# Patient Record
Sex: FEMALE | Race: WHITE | NOT HISPANIC OR LATINO | Employment: FULL TIME | URBAN - METROPOLITAN AREA
[De-identification: names, ages, dates, MRNs, and addresses within clinical notes are randomized per-mention and may not be internally consistent; named-entity substitution may affect disease eponyms.]

---

## 2017-01-18 ENCOUNTER — LAB VISIT (OUTPATIENT)
Dept: LAB | Facility: HOSPITAL | Age: 27
End: 2017-01-18
Attending: INTERNAL MEDICINE
Payer: COMMERCIAL

## 2017-01-18 DIAGNOSIS — I10 ESSENTIAL HYPERTENSION: ICD-10-CM

## 2017-01-18 LAB
ANION GAP SERPL CALC-SCNC: 8 MMOL/L
BUN SERPL-MCNC: 10 MG/DL
CALCIUM SERPL-MCNC: 8.7 MG/DL
CHLORIDE SERPL-SCNC: 108 MMOL/L
CO2 SERPL-SCNC: 22 MMOL/L
CREAT SERPL-MCNC: 0.9 MG/DL
EST. GFR  (AFRICAN AMERICAN): >60 ML/MIN/1.73 M^2
EST. GFR  (NON AFRICAN AMERICAN): >60 ML/MIN/1.73 M^2
GLUCOSE SERPL-MCNC: 91 MG/DL
POTASSIUM SERPL-SCNC: 4.1 MMOL/L
SODIUM SERPL-SCNC: 138 MMOL/L

## 2017-01-18 PROCEDURE — 36415 COLL VENOUS BLD VENIPUNCTURE: CPT | Mod: PO

## 2017-01-18 PROCEDURE — 80048 BASIC METABOLIC PNL TOTAL CA: CPT

## 2017-01-25 ENCOUNTER — OFFICE VISIT (OUTPATIENT)
Dept: NEPHROLOGY | Facility: CLINIC | Age: 27
End: 2017-01-25
Payer: COMMERCIAL

## 2017-01-25 VITALS
HEIGHT: 67 IN | HEART RATE: 104 BPM | SYSTOLIC BLOOD PRESSURE: 130 MMHG | BODY MASS INDEX: 28.55 KG/M2 | DIASTOLIC BLOOD PRESSURE: 80 MMHG | WEIGHT: 181.88 LBS

## 2017-01-25 DIAGNOSIS — I10 ESSENTIAL HYPERTENSION: ICD-10-CM

## 2017-01-25 DIAGNOSIS — O09.90 HIGH-RISK PREGNANCY, UNSPECIFIED TRIMESTER: ICD-10-CM

## 2017-01-25 DIAGNOSIS — R80.9 PROTEINURIA, UNSPECIFIED TYPE: ICD-10-CM

## 2017-01-25 DIAGNOSIS — Q61.3 POLYCYSTIC KIDNEY DISEASE: Primary | ICD-10-CM

## 2017-01-25 PROCEDURE — 1159F MED LIST DOCD IN RCRD: CPT | Mod: S$GLB,,, | Performed by: INTERNAL MEDICINE

## 2017-01-25 PROCEDURE — 99999 PR PBB SHADOW E&M-EST. PATIENT-LVL III: CPT | Mod: PBBFAC,,, | Performed by: INTERNAL MEDICINE

## 2017-01-25 PROCEDURE — 99214 OFFICE O/P EST MOD 30 MIN: CPT | Mod: S$GLB,,, | Performed by: INTERNAL MEDICINE

## 2017-01-25 NOTE — MR AVS SNAPSHOT
[facility] Nephrology  [address] [place] [address]  Phone: [phone]  Fax: [phone]                  Nathalie Roland   2017 1:30 PM   Office Visit    Description:  Female : 1990   Provider:  Mee Rabago MD   Department:  [facility] - Nephrology           Reason for Visit     Polycystic Kidney Disease     Follow-up           Diagnoses this Visit        Comments    Polycystic kidney disease    -  Primary            To Do List           Future Appointments        Provider Department Dept Phone    2017 2:00 PM ULTRASOUND, OB-GYN [facility] OB/ -[phone]    2017 4:45 PM Mckenzie Metcalf CNM [facility] OB/ -[phone]    3/14/2017 9:45 AM LABORATORY, [facility] [phone]    3/14/2017 9:50 AM SPECIMEN, [facility] [phone]    3/21/2017 3:00 PM [facility] [phone]      Goals (5 Years of Data)     None      Follow-Up and Disposition     Return in about 2 months (around 3/25/2017) for 2017.      [facility]     [facility] -  Assistance  Registered nurses in the [facility] provide clinical advisement, health education, appointment booking, and other advisory services.  Call for this free service at [phone].             Medications           Message regarding Medications     Verify the changes and/or additions to your medication regime listed below are the same as discussed with your clinician today.  If any of these changes or additions are incorrect, please notify your healthcare provider.             Verify that the below list of medications is an accurate representation of the medications you are currently taking.  If none reported, the list may be blank. If incorrect, please contact your healthcare provider. Carry this list with you in case of emergency.           Current Medications     butalbital-acetaminophen-caffeine  "-40 mg (FIORICET) -40 mg per tablet Take 1-2 tablets by mouth every 6 (six) hours as needed for Headaches.    methyldopa (ALDOMET) 250 MG tablet Take 2 tablets (500 mg total) by mouth every 12 (twelve) hours.           Clinical Reference Information           Prenatal Vitals     Enc. Date GA Prenatal Vitals Prenatal Pulse Pain Level Urine Albumin/Glucose Edema Presentation Dilation/Effacement/Station    1/25/17 23w1d 130/80 / 82.5 kg (181 lb 14.1 oz)  104         12/29/16 19w2d 112/70 / 80.7 kg (178 lb)  / 148 / Present          12/23/16 18w3d 144/92 (A) / 82.1 kg (181 lb)  / 163 / Present          12/1/16 15w2d 128/76 / 79.8 kg (176 lb)  / 160  / Absent  0  None / None      11/3/16 11w2d 138/88 / 78 kg (171 lb 15.3 oz)   0           TWG: 3.629 kg (8 lb)   Pregravid weight: 77.1 kg (170 lb)   Number of fetuses: 1       Vital Signs - Last Recorded  Most recent update: 1/25/2017  1:30 PM by Sukhdeep Fraser MA    BP Pulse Ht Wt LMP BMI    130/80 104 5' 7" (1.702 m) 82.5 kg (181 lb 14.1 oz) 08/16/2016 (Exact Date) 28.49 kg/m2      Blood Pressure          Most Recent Value    BP  130/80      Allergies as of 1/25/2017     No Known Allergies      Immunizations Administered on Date of Encounter - 1/25/2017     None      Orders Placed During Today's Visit     Future Labs/Procedures Expected by Expires    Basic metabolic panel  1/25/2017 3/26/2018    CBC auto differential  1/25/2017 3/26/2018    Protein / creatinine ratio, urine  As directed 1/25/2018      "

## 2017-01-25 NOTE — PROGRESS NOTES
NEPHROLOGY CLINIC FOLLOWUP NOTE        Date of clinic visit: 1/25/17      REASON FOR FOLLOWUP AND CHIEF COMPLAINT: history of polycystic kidney disease.        HISTORY OF PRESENT ILLNESS: Ms. Morales is a 26-year-old  female with a history of polycystic kidney disease who presents for followup. Pt was about 2 months ago. Pt is now 23 weeks pregnant. She has followed closely with OB/Gyn. Pt is on methyldopa, the dose was increased last visit to control BP. She reports no problems with the medicine. She says she is doing well today. No new or acute c/o's, no new problems, no shortness of breath, no recent infections of the kidney. No UTIs.      PAST MEDICAL HISTORY:    1. Polycystic kidney disease diagnosed at the age of 12.    2. Hypertension.    3. Upper respiratory tract infections.    4. Hernia repair at the age of 2.    5. Migraine headaches.        FAMILY HISTORY: Reviewed. Mother with ESRD, status post kidney    transplant. Mother has polycystic kidney disease.      ALLERGIES: Reviewed. No known drug allergies.        MEDICATIONS: methyldopa 500 mg po bid    REVIEW OF SYSTEMS: No recent hospitalizations. No recent other issues.    HEAD, EAR, EYES, NOSE, THROAT: Negative.    CARDIAC: Negative.    PULMONARY: Negative.    GASTROINTESTINAL: Negative.    GENITOURINARY: Negative.    PSYCHOLOGICAL: Negative.    NEUROLOGICAL: Negative.    The rest of the review of systems negative.        PHYSICAL EXAMINATION:    VITAL SIGNS: Blood pressure on arrival was 130/80. Pulse 104. Weight is 181 lbs, last visit 176 lbs  GENERAL: She is ambulatory, in no acute distress.    HEART: Regular rate and rhythm.  s1 and s2 audible  CHEST: Clear to auscultation.  no rales no wheezes  ABDOMEN: Soft, nontender.    EXTREMITIES: Showed no edema.        Labs: reviewed  BMP  Lab Results   Component Value Date     01/18/2017    K 4.1 01/18/2017     01/18/2017    CO2 22 (L) 01/18/2017    BUN 10 01/18/2017    CREATININE 0.9  01/18/2017    CALCIUM 8.7 01/18/2017    ANIONGAP 8 01/18/2017    ESTGFRAFRICA >60.0 01/18/2017    EGFRNONAA >60.0 01/18/2017     Lab Results   Component Value Date    WBC 8.08 10/20/2016    HGB 11.1 (L) 10/20/2016    HCT 34.2 (L) 10/20/2016    MCV 85 10/20/2016     10/20/2016     Urine p/cr 160 mg  ua reviewed     ASSESSMENT AND PLAN: This is a 26 year-old female with history of polycystic kidney disease who presents for followup.  Pt is pregnant  The impression is as follows:        1. Renal function is stable. PCKD  Creatinine has not changed, stable renal function.  Cr is at baseline  Mild proteinuria, stable      2. Hypertension. Blood pressure is controlled  On methyldopa, taking the medicine      3. OB: pt is 23 wks pregnant.  Following with OB.      4. Headaches. No issues today.   MRI of the head was normal in 2007.       PLANS AND RECOMMENDATIONS:    As detailed above  RTC 2 months  Total time spent 25 min. More than 50% spent on counseling and coordination of care.      Mee Rabago M.D.

## 2017-01-26 ENCOUNTER — PATIENT MESSAGE (OUTPATIENT)
Dept: OBSTETRICS AND GYNECOLOGY | Facility: CLINIC | Age: 27
End: 2017-01-26

## 2017-02-02 ENCOUNTER — PROCEDURE VISIT (OUTPATIENT)
Dept: OBSTETRICS AND GYNECOLOGY | Facility: CLINIC | Age: 27
End: 2017-02-02
Payer: COMMERCIAL

## 2017-02-02 ENCOUNTER — ROUTINE PRENATAL (OUTPATIENT)
Dept: OBSTETRICS AND GYNECOLOGY | Facility: CLINIC | Age: 27
End: 2017-02-02
Payer: COMMERCIAL

## 2017-02-02 VITALS
BODY MASS INDEX: 28.52 KG/M2 | WEIGHT: 182.13 LBS | SYSTOLIC BLOOD PRESSURE: 134 MMHG | DIASTOLIC BLOOD PRESSURE: 88 MMHG

## 2017-02-02 DIAGNOSIS — Z36.89 ENCOUNTER FOR FETAL ANATOMIC SURVEY: ICD-10-CM

## 2017-02-02 DIAGNOSIS — Z34.02 ENCOUNTER FOR SUPERVISION OF NORMAL FIRST PREGNANCY IN SECOND TRIMESTER: Primary | ICD-10-CM

## 2017-02-02 PROCEDURE — 0502F SUBSEQUENT PRENATAL CARE: CPT | Mod: S$GLB,,, | Performed by: ADVANCED PRACTICE MIDWIFE

## 2017-02-02 PROCEDURE — 76816 OB US FOLLOW-UP PER FETUS: CPT | Mod: S$GLB,,, | Performed by: OBSTETRICS & GYNECOLOGY

## 2017-02-02 PROCEDURE — 99999 PR PBB SHADOW E&M-EST. PATIENT-LVL II: CPT | Mod: PBBFAC,,, | Performed by: ADVANCED PRACTICE MIDWIFE

## 2017-02-02 NOTE — PROGRESS NOTES
Doing well, denies problems. US today showed normal and complete anatomy, abdominal wall, spine, and heart. EFW 1 lb 5 oz, 12%.  28 week labs next visit. Will get baseline CMP because of her history of kidney disease.

## 2017-02-10 ENCOUNTER — TELEPHONE (OUTPATIENT)
Dept: OBSTETRICS AND GYNECOLOGY | Facility: CLINIC | Age: 27
End: 2017-02-10

## 2017-02-10 NOTE — TELEPHONE ENCOUNTER
----- Message from Mel Waggoner sent at 2/10/2017 10:35 AM CST -----  Contact: Patient  Patient has had a bad cold for a week and would like to be adivsed, please call her back at [phone]. Thank you

## 2017-03-03 ENCOUNTER — LAB VISIT (OUTPATIENT)
Dept: LAB | Facility: HOSPITAL | Age: 27
End: 2017-03-03
Attending: NURSE PRACTITIONER
Payer: COMMERCIAL

## 2017-03-03 ENCOUNTER — ROUTINE PRENATAL (OUTPATIENT)
Dept: OBSTETRICS AND GYNECOLOGY | Facility: CLINIC | Age: 27
End: 2017-03-03
Payer: COMMERCIAL

## 2017-03-03 VITALS — WEIGHT: 186.5 LBS | DIASTOLIC BLOOD PRESSURE: 62 MMHG | BODY MASS INDEX: 29.21 KG/M2 | SYSTOLIC BLOOD PRESSURE: 118 MMHG

## 2017-03-03 DIAGNOSIS — Z23 NEED FOR TDAP VACCINATION: ICD-10-CM

## 2017-03-03 DIAGNOSIS — Z34.03 ENCOUNTER FOR SUPERVISION OF NORMAL FIRST PREGNANCY IN THIRD TRIMESTER: Primary | ICD-10-CM

## 2017-03-03 DIAGNOSIS — Z34.02 ENCOUNTER FOR SUPERVISION OF NORMAL FIRST PREGNANCY IN SECOND TRIMESTER: ICD-10-CM

## 2017-03-03 LAB
ALBUMIN SERPL BCP-MCNC: 2.8 G/DL
ALP SERPL-CCNC: 69 U/L
ALT SERPL W/O P-5'-P-CCNC: 24 U/L
ANION GAP SERPL CALC-SCNC: 12 MMOL/L
AST SERPL-CCNC: 16 U/L
BASOPHILS # BLD AUTO: 0.02 K/UL
BASOPHILS NFR BLD: 0.2 %
BILIRUB SERPL-MCNC: 0.4 MG/DL
BUN SERPL-MCNC: 12 MG/DL
CALCIUM SERPL-MCNC: 9 MG/DL
CHLORIDE SERPL-SCNC: 106 MMOL/L
CO2 SERPL-SCNC: 19 MMOL/L
CREAT SERPL-MCNC: 0.9 MG/DL
DIFFERENTIAL METHOD: ABNORMAL
EOSINOPHIL # BLD AUTO: 0.2 K/UL
EOSINOPHIL NFR BLD: 1.5 %
ERYTHROCYTE [DISTWIDTH] IN BLOOD BY AUTOMATED COUNT: 13.8 %
EST. GFR  (AFRICAN AMERICAN): >60 ML/MIN/1.73 M^2
EST. GFR  (NON AFRICAN AMERICAN): >60 ML/MIN/1.73 M^2
GLUCOSE SERPL-MCNC: 148 MG/DL
GLUCOSE SERPL-MCNC: 150 MG/DL
HCT VFR BLD AUTO: 32.9 %
HGB BLD-MCNC: 11 G/DL
HIV 1+2 AB+HIV1 P24 AG SERPL QL IA: NEGATIVE
LYMPHOCYTES # BLD AUTO: 1.9 K/UL
LYMPHOCYTES NFR BLD: 16.7 %
MCH RBC QN AUTO: 30.5 PG
MCHC RBC AUTO-ENTMCNC: 33.4 %
MCV RBC AUTO: 91 FL
MONOCYTES # BLD AUTO: 0.8 K/UL
MONOCYTES NFR BLD: 6.8 %
NEUTROPHILS # BLD AUTO: 8.3 K/UL
NEUTROPHILS NFR BLD: 74.2 %
PLATELET # BLD AUTO: 218 K/UL
PMV BLD AUTO: 10.8 FL
POTASSIUM SERPL-SCNC: 3.8 MMOL/L
PROT SERPL-MCNC: 6.4 G/DL
RBC # BLD AUTO: 3.61 M/UL
SODIUM SERPL-SCNC: 137 MMOL/L
WBC # BLD AUTO: 11.13 K/UL

## 2017-03-03 PROCEDURE — 90471 IMMUNIZATION ADMIN: CPT | Mod: S$GLB,,, | Performed by: ADVANCED PRACTICE MIDWIFE

## 2017-03-03 PROCEDURE — 36415 COLL VENOUS BLD VENIPUNCTURE: CPT | Mod: PO

## 2017-03-03 PROCEDURE — 0502F SUBSEQUENT PRENATAL CARE: CPT | Mod: S$GLB,,, | Performed by: ADVANCED PRACTICE MIDWIFE

## 2017-03-03 PROCEDURE — 82950 GLUCOSE TEST: CPT

## 2017-03-03 PROCEDURE — 80053 COMPREHEN METABOLIC PANEL: CPT

## 2017-03-03 PROCEDURE — 85025 COMPLETE CBC W/AUTO DIFF WBC: CPT

## 2017-03-03 PROCEDURE — 90715 TDAP VACCINE 7 YRS/> IM: CPT | Mod: S$GLB,,, | Performed by: ADVANCED PRACTICE MIDWIFE

## 2017-03-03 PROCEDURE — 86592 SYPHILIS TEST NON-TREP QUAL: CPT

## 2017-03-03 PROCEDURE — 99999 PR PBB SHADOW E&M-EST. PATIENT-LVL II: CPT | Mod: PBBFAC,,, | Performed by: ADVANCED PRACTICE MIDWIFE

## 2017-03-03 PROCEDURE — 86703 HIV-1/HIV-2 1 RESULT ANTBDY: CPT

## 2017-03-03 NOTE — PROGRESS NOTES
After identifying patient with 2 identifiers, verifying that patient wished to receive Tdap, reviewing allergies, 0.5 ml Tdap administered to right deltoid.  Patient tolerated well.  Patient instructed to wait 15 minutes and verbalized understanding.  Next appointment was already scheduled.

## 2017-03-03 NOTE — PROGRESS NOTES
Doing great, 28 week labs today. Plans epidural, breastfeeding, and condoms for birth control. TDAP today. PTL discussion, find pediatrician, and childbirth and breastfeeding classes.

## 2017-03-04 LAB — RPR SER QL: NORMAL

## 2017-03-05 DIAGNOSIS — O99.810 ABNORMAL GLUCOSE AFFECTING PREGNANCY: Primary | ICD-10-CM

## 2017-03-06 ENCOUNTER — TELEPHONE (OUTPATIENT)
Dept: OBSTETRICS AND GYNECOLOGY | Facility: CLINIC | Age: 27
End: 2017-03-06

## 2017-03-06 NOTE — TELEPHONE ENCOUNTER
----- Message from Mckenzie Metcalf CNM sent at 3/5/2017 10:36 AM CST -----  Pt failed 1 hour gtt, please schedule 3 hour GTT. Order is in.    Mckenzie

## 2017-03-07 ENCOUNTER — LAB VISIT (OUTPATIENT)
Dept: LAB | Facility: HOSPITAL | Age: 27
End: 2017-03-07
Attending: ADVANCED PRACTICE MIDWIFE
Payer: COMMERCIAL

## 2017-03-07 DIAGNOSIS — O99.810 ABNORMAL GLUCOSE AFFECTING PREGNANCY: ICD-10-CM

## 2017-03-07 LAB
GLUCOSE SERPL-MCNC: 118 MG/DL
GLUCOSE SERPL-MCNC: 190 MG/DL
GLUCOSE SERPL-MCNC: 87 MG/DL
GLUCOSE SERPL-MCNC: 95 MG/DL

## 2017-03-07 PROCEDURE — 36415 COLL VENOUS BLD VENIPUNCTURE: CPT | Mod: PO

## 2017-03-07 PROCEDURE — 82951 GLUCOSE TOLERANCE TEST (GTT): CPT

## 2017-03-08 ENCOUNTER — PATIENT MESSAGE (OUTPATIENT)
Dept: OBSTETRICS AND GYNECOLOGY | Facility: HOSPITAL | Age: 27
End: 2017-03-08

## 2017-03-09 ENCOUNTER — PATIENT MESSAGE (OUTPATIENT)
Dept: OBSTETRICS AND GYNECOLOGY | Facility: CLINIC | Age: 27
End: 2017-03-09

## 2017-03-14 ENCOUNTER — LAB VISIT (OUTPATIENT)
Dept: LAB | Facility: HOSPITAL | Age: 27
End: 2017-03-14
Attending: INTERNAL MEDICINE
Payer: COMMERCIAL

## 2017-03-14 DIAGNOSIS — Q61.3 POLYCYSTIC KIDNEY DISEASE: ICD-10-CM

## 2017-03-14 LAB
ANION GAP SERPL CALC-SCNC: 10 MMOL/L
BASOPHILS # BLD AUTO: 0.02 K/UL
BASOPHILS NFR BLD: 0.2 %
BUN SERPL-MCNC: 11 MG/DL
CALCIUM SERPL-MCNC: 8.7 MG/DL
CHLORIDE SERPL-SCNC: 108 MMOL/L
CO2 SERPL-SCNC: 20 MMOL/L
CREAT SERPL-MCNC: 0.8 MG/DL
DIFFERENTIAL METHOD: ABNORMAL
EOSINOPHIL # BLD AUTO: 0.2 K/UL
EOSINOPHIL NFR BLD: 1.6 %
ERYTHROCYTE [DISTWIDTH] IN BLOOD BY AUTOMATED COUNT: 13.6 %
EST. GFR  (AFRICAN AMERICAN): >60 ML/MIN/1.73 M^2
EST. GFR  (NON AFRICAN AMERICAN): >60 ML/MIN/1.73 M^2
GLUCOSE SERPL-MCNC: 94 MG/DL
HCT VFR BLD AUTO: 32.6 %
HGB BLD-MCNC: 11 G/DL
LYMPHOCYTES # BLD AUTO: 2.7 K/UL
LYMPHOCYTES NFR BLD: 22 %
MCH RBC QN AUTO: 30.8 PG
MCHC RBC AUTO-ENTMCNC: 33.7 %
MCV RBC AUTO: 91 FL
MONOCYTES # BLD AUTO: 1 K/UL
MONOCYTES NFR BLD: 8.4 %
NEUTROPHILS # BLD AUTO: 8.1 K/UL
NEUTROPHILS NFR BLD: 67.1 %
PLATELET # BLD AUTO: 225 K/UL
PMV BLD AUTO: 11.2 FL
POTASSIUM SERPL-SCNC: 3.9 MMOL/L
RBC # BLD AUTO: 3.57 M/UL
SODIUM SERPL-SCNC: 138 MMOL/L
WBC # BLD AUTO: 12.11 K/UL

## 2017-03-14 PROCEDURE — 36415 COLL VENOUS BLD VENIPUNCTURE: CPT | Mod: PO

## 2017-03-14 PROCEDURE — 85025 COMPLETE CBC W/AUTO DIFF WBC: CPT

## 2017-03-14 PROCEDURE — 80048 BASIC METABOLIC PNL TOTAL CA: CPT

## 2017-03-17 ENCOUNTER — ROUTINE PRENATAL (OUTPATIENT)
Dept: OBSTETRICS AND GYNECOLOGY | Facility: CLINIC | Age: 27
End: 2017-03-17
Payer: COMMERCIAL

## 2017-03-17 VITALS — WEIGHT: 190.94 LBS | SYSTOLIC BLOOD PRESSURE: 124 MMHG | BODY MASS INDEX: 29.9 KG/M2 | DIASTOLIC BLOOD PRESSURE: 86 MMHG

## 2017-03-17 DIAGNOSIS — Z34.03 ENCOUNTER FOR SUPERVISION OF NORMAL FIRST PREGNANCY IN THIRD TRIMESTER: Primary | ICD-10-CM

## 2017-03-17 PROCEDURE — 0502F SUBSEQUENT PRENATAL CARE: CPT | Mod: S$GLB,,, | Performed by: ADVANCED PRACTICE MIDWIFE

## 2017-03-17 PROCEDURE — 99999 PR PBB SHADOW E&M-EST. PATIENT-LVL II: CPT | Mod: PBBFAC,,, | Performed by: ADVANCED PRACTICE MIDWIFE

## 2017-03-17 NOTE — PROGRESS NOTES
Doing well, reviewed 3 hour GTT, normal. Still unable to schedule childbirth and breastfeeding classes.

## 2017-03-17 NOTE — MR AVS SNAPSHOT
[facility] - OB/ GYN  [address] [place] [address]  Phone: [phone]  Fax: [phone]                  Nathalie Roland   3/17/2017 2:45 PM   Routine Prenatal    Description:  Female : 1990   Provider:  Mckenzie Metcalf CNM   Department:  [facility] - OB/ GYN           Reason for Visit     Routine Prenatal Visit           Diagnoses this Visit        Comments    Encounter for supervision of normal first pregnancy in third trimester    -  Primary            To Do List           Future Appointments        Provider Department Dept Phone    3/21/2017 3:00 PM Mee Rabago MD [facility] - Nephrology [phone]    2017 1:30 PM Mckenzie Metcalf CNM [facility] - OB/ -[phone]      Goals (5 Years of Data)     None      [facility]     [facility] -  Assistance  Registered nurses in the [facility] Call Center provide clinical advisement, health education, appointment booking, and other advisory services.  Call for this free service at [phone].             Medications           Message regarding Medications     Verify the changes and/or additions to your medication regime listed below are the same as discussed with your clinician today.  If any of these changes or additions are incorrect, please notify your healthcare provider.             Verify that the below list of medications is an accurate representation of the medications you are currently taking.  If none reported, the list may be blank. If incorrect, please contact your healthcare provider. Carry this list with you in case of emergency.           Current Medications     butalbital-acetaminophen-caffeine -40 mg (FIORICET) -40 mg per tablet Take 1-2 tablets by mouth every 6 (six) hours as needed for Headaches.    methyldopa (ALDOMET) 250 MG tablet Take 2 tablets (500 mg total) by mouth every 12 (twelve) hours.           Clinical Reference Information           Prenatal Vitals     Enc. Date GA  Prenatal Vitals Prenatal Pulse Pain Level Urine Albumin/Glucose Edema Presentation Dilation/Effacement/Station    3/17/17 30w3d 124/86 / 86.6 kg (190 lb 14.7 oz)  / 159 / Present  3 Negative / Negative None / None / None / No      3/3/17 28w3d 118/62 / 84.6 kg (186 lb 8.2 oz) 27 cm / 157 / Present  0  None / None / None      17 24w2d 134/88 / 82.6 kg (182 lb 1.6 oz) 24.5 cm / us 162 / Present  0  None / None      16 19w2d 112/70 / 80.7 kg (178 lb)  / 148 / Present          16 18w3d 144/92 (A) / 82.1 kg (181 lb)  / 163 / Present          16 15w2d 128/76 / 79.8 kg (176 lb)  / 160  / Absent  0  None / None      11/3/16 11w2d 138/88 / 78 kg (171 lb 15.3 oz)   0           TW.488 kg (20 lb 14.7 oz)   Pregravid weight: 77.1 kg (170 lb)   Number of fetuses: 1       Your Vitals Were     BP Weight Last Period BMI       124/86 86.6 kg (190 lb 14.7 oz) 2016 (Exact Date) 29.9 kg/m2       Allergies as of 3/17/2017     No Known Allergies      Immunizations Administered on Date of Encounter - 3/17/2017     None      Language Assistance Services     ATTENTION: Language assistance services are available, free of charge. Please call [phone].      ATENCIÓN: Si habla español, tiene a bernal disposición servicios gratuitos de asistencia lingüística. Llame al [phone].     [facility] Ý: N?u b?n nói Ti?ng Vi?t, có các d?ch v? h? tr? ngôn ng? mi?n phí dành cho b?n. G?i s? [phone].         [facility] - OB/ GYN complies with applicable Federal civil rights laws and does not discriminate on the basis of race, color, national origin, age, disability, or sex.

## 2017-03-21 ENCOUNTER — OFFICE VISIT (OUTPATIENT)
Dept: NEPHROLOGY | Facility: CLINIC | Age: 27
End: 2017-03-21
Payer: COMMERCIAL

## 2017-03-21 VITALS
HEIGHT: 67 IN | BODY MASS INDEX: 30.42 KG/M2 | DIASTOLIC BLOOD PRESSURE: 92 MMHG | WEIGHT: 193.81 LBS | SYSTOLIC BLOOD PRESSURE: 140 MMHG | HEART RATE: 96 BPM

## 2017-03-21 DIAGNOSIS — I10 ESSENTIAL HYPERTENSION: ICD-10-CM

## 2017-03-21 DIAGNOSIS — Z3A.31 31 WEEKS GESTATION OF PREGNANCY: ICD-10-CM

## 2017-03-21 DIAGNOSIS — Q61.3 POLYCYSTIC KIDNEY DISEASE: Primary | ICD-10-CM

## 2017-03-21 PROCEDURE — 99214 OFFICE O/P EST MOD 30 MIN: CPT | Mod: S$GLB,,, | Performed by: INTERNAL MEDICINE

## 2017-03-21 PROCEDURE — 99999 PR PBB SHADOW E&M-EST. PATIENT-LVL III: CPT | Mod: PBBFAC,,, | Performed by: INTERNAL MEDICINE

## 2017-03-21 PROCEDURE — 1160F RVW MEDS BY RX/DR IN RCRD: CPT | Mod: S$GLB,,, | Performed by: INTERNAL MEDICINE

## 2017-03-21 NOTE — MR AVS SNAPSHOT
[facility] Nephrology  [address] [place] [address]  Phone: [phone]  Fax: [phone]                  Nathalie Roland   3/21/2017 3:00 PM   Office Visit    Description:  Female : 1990   Provider:  Mee Rabago MD   [facility]           Reason for Visit     Polycystic Kidney Disease     Follow-up           Diagnoses this Visit        Comments    Polycystic kidney disease    -  Primary            To Do List           Future Appointments        Provider Department Dept Phone    2017 1:30 PM Mckenzie Metcalf CNM [facility] OB/ -[phone]    2017 9:55 AM LABORATORY, [facility] [phone]    2017 10:00 AM SPECIMEN, [facility] [phone]    2017 3:00 PM Mee Rabago MD [facility] Nephrology [phone]      Goals (5 Years of Data)     None      Follow-Up and Disposition     Return in about 2 months (around 2017) for May 2017.      [facility] On [facility]     [facility] Nurse Care Line -  Assistance  Registered nurses in the [facility] provide clinical advisement, health education, appointment booking, and other advisory services.  Call for this free service at [phone].             Medications           Message regarding Medications     Verify the changes and/or additions to your medication regime listed below are the same as discussed with your clinician today.  If any of these changes or additions are incorrect, please notify your healthcare provider.             Verify that the below list of medications is an accurate representation of the medications you are currently taking.  If none reported, the list may be blank. If incorrect, please contact your healthcare provider. Carry this list with you in case of emergency.           Current Medications     butalbital-acetaminophen-caffeine -40 mg (FIORICET) -40 mg per tablet Take 1-2 tablets by mouth  "every 6 (six) hours as needed for Headaches.    methyldopa (ALDOMET) 250 MG tablet Take 2 tablets (500 mg total) by mouth every 12 (twelve) hours.           Clinical Reference Information           Prenatal Vitals     Enc. Date GA Prenatal Vitals Prenatal Pulse Pain Level Urine Albumin/Glucose Edema Presentation Dilation/Effacement/Station    3/21/17 31w0d 140/92 (A) / 87.9 kg (193 lb 12.6 oz)  96         3/17/17 30w3d 124/86 / 86.6 kg (190 lb 14.7 oz)  / 159 / Present  3 Negative / Negative None / None / None / No      3/3/17 28w3d 118/62 / 84.6 kg (186 lb 8.2 oz) 27 cm / 157 / Present  0  None / None / None      17 24w2d 134/88 / 82.6 kg (182 lb 1.6 oz) 24.5 cm / us 162 / Present  0  None / None      16 19w2d 112/70 / 80.7 kg (178 lb)  / 148 / Present          16 18w3d 144/92 (A) / 82.1 kg (181 lb)  / 163 / Present          16 15w2d 128/76 / 79.8 kg (176 lb)  / 160  / Absent  0  None / None      11/3/16 11w2d 138/88 / 78 kg (171 lb 15.3 oz)   0           TW.488 kg (20 lb 14.7 oz)   Pregravid weight: 77.1 kg (170 lb)   Number of fetuses: 1       Your Vitals Were     BP Pulse Height Weight Last Period BMI    140/92 96 5' 7" (1.702 m) 87.9 kg (193 lb 12.6 oz) 2016 (Exact Date) 30.35 kg/m2      Blood Pressure          Most Recent Value    BP  (!)  140/92      Allergies as of 3/21/2017     No Known Allergies      Immunizations Administered on Date of Encounter - 3/21/2017     None      Orders Placed During Today's Visit     Future Labs/Procedures Expected by Expires    Basic metabolic panel  3/21/2017 2018    CBC auto differential  3/21/2017 2018    Protein / creatinine ratio, urine  As directed 3/21/2018      Language Assistance Services     ATTENTION: Language assistance services are available, free of charge. Please call [phone].      ATENCIÓN: Si habla español, tiene a bernal disposición servicios gratuitos de asistencia lingüística. Llame al [phone].     CHÚ Ý: " N?u b?n nói Ti?ng Vi?t, có các d?ch v? h? tr? ngôn ng? mi?n phí dành cho b?n. G?i s? [phone].         [facility] - Nephrology complies with applicable Federal civil rights laws and does not discriminate on the basis of race, color, national origin, age, disability, or sex.

## 2017-03-21 NOTE — PROGRESS NOTES
NEPHROLOGY CLINIC FOLLOWUP NOTE        Date of clinic visit: 3/21/17      REASON FOR FOLLOWUP AND CHIEF COMPLAINT: history of polycystic kidney disease.        HISTORY OF PRESENT ILLNESS: Ms. Morales is a 26-year-old  female with a history of polycystic kidney disease who presents for followup. Pt is pregnant and will be due in May 2017. Pt was about 2 months ago. She has followed closely with OB/Gyn. Pt is on methyldopa, the dose was increased last visit to control BP. She reports no problems with the medicine. She says she is doing well today. No new or acute c/o's, no new problems, no shortness of breath, no recent infections of the kidney. No UTIs.      PAST MEDICAL HISTORY:    1. Polycystic kidney disease diagnosed at the age of 12.    2. Hypertension.    3. Upper respiratory tract infections.    4. Hernia repair at the age of 2.    5. Migraine headaches.        FAMILY HISTORY: Reviewed. Mother with ESRD, status post kidney    transplant. Mother has polycystic kidney disease.      ALLERGIES: Reviewed. No known drug allergies.        MEDICATIONS: methyldopa 500 mg po bid     REVIEW OF SYSTEMS: No recent hospitalizations. No recent other issues.    HEAD, EAR, EYES, NOSE, THROAT: Negative.    CARDIAC: Negative.    PULMONARY: Negative.    GASTROINTESTINAL: Negative.    GENITOURINARY: Negative.    PSYCHOLOGICAL: Negative.    NEUROLOGICAL: Negative.    The rest of the review of systems negative.        PHYSICAL EXAMINATION:    VITAL SIGNS: Blood pressure on arrival was 140/90. Pulse 96, Weight is 193 lbs, last visit 181 lbs  GENERAL: She is ambulatory, in no acute distress.    HEART: Regular rate and rhythm.  s1 and s2 audible  CHEST: Clear to auscultation.  no rales no wheezes  ABDOMEN: Soft, nontender.    EXTREMITIES: Showed no edema.        Labs: reviewed  BMP  Lab Results   Component Value Date     03/14/2017    K 3.9 03/14/2017     03/14/2017    CO2 20 (L) 03/14/2017    BUN 11 03/14/2017     CREATININE 0.8 03/14/2017    CALCIUM 8.7 03/14/2017    ANIONGAP 10 03/14/2017    ESTGFRAFRICA >60.0 03/14/2017    EGFRNONAA >60.0 03/14/2017     Lab Results   Component Value Date    WBC 12.11 03/14/2017    HGB 11.0 (L) 03/14/2017    HCT 32.6 (L) 03/14/2017    MCV 91 03/14/2017     03/14/2017      Urine p/cr 180 mg  ua reviewed      ASSESSMENT AND PLAN: This is a 26 year-old female with history of polycystic kidney disease who presents for followup.  Pt is pregnant  The impression is as follows:        1. Renal function is stable. PCKD  Creatinine has not changed, stable renal function.  Cr is at baseline  Mild proteinuria, stable, microalbuminuria  No sign of pre-eclampsia      2. Hypertension. Blood pressure is controlled, previous measurements reviewed, controlled.   On methyldopa, taking the medicine      3. OB: pt is 31 wks pregnant.  Following with OB.      4. Headaches. No issues today.   MRI of the head was normal in 2007.       PLANS AND RECOMMENDATIONS:    As detailed above  RTC 2 months  Total time spent 25 min. More than 50% spent on counseling and coordination of care.      Mee Rabago M.D.

## 2017-03-24 ENCOUNTER — PATIENT MESSAGE (OUTPATIENT)
Dept: OBSTETRICS AND GYNECOLOGY | Facility: CLINIC | Age: 27
End: 2017-03-24

## 2017-04-07 ENCOUNTER — LAB VISIT (OUTPATIENT)
Dept: LAB | Facility: HOSPITAL | Age: 27
End: 2017-04-07
Attending: ADVANCED PRACTICE MIDWIFE
Payer: COMMERCIAL

## 2017-04-07 ENCOUNTER — ROUTINE PRENATAL (OUTPATIENT)
Dept: OBSTETRICS AND GYNECOLOGY | Facility: CLINIC | Age: 27
End: 2017-04-07
Payer: COMMERCIAL

## 2017-04-07 VITALS — BODY MASS INDEX: 30.39 KG/M2 | SYSTOLIC BLOOD PRESSURE: 126 MMHG | DIASTOLIC BLOOD PRESSURE: 96 MMHG | WEIGHT: 194 LBS

## 2017-04-07 DIAGNOSIS — Q61.3 POLYCYSTIC KIDNEY DISEASE: Primary | ICD-10-CM

## 2017-04-07 DIAGNOSIS — Q61.3 POLYCYSTIC KIDNEY DISEASE: ICD-10-CM

## 2017-04-07 LAB
ALBUMIN SERPL BCP-MCNC: 2.8 G/DL
ALP SERPL-CCNC: 77 U/L
ALT SERPL W/O P-5'-P-CCNC: 20 U/L
ANION GAP SERPL CALC-SCNC: 8 MMOL/L
AST SERPL-CCNC: 15 U/L
BACTERIA #/AREA URNS HPF: ABNORMAL /HPF
BASOPHILS # BLD AUTO: 0.01 K/UL
BASOPHILS NFR BLD: 0.1 %
BILIRUB SERPL-MCNC: 0.5 MG/DL
BILIRUB UR QL STRIP: NEGATIVE
BUN SERPL-MCNC: 14 MG/DL
CALCIUM SERPL-MCNC: 8.8 MG/DL
CHLORIDE SERPL-SCNC: 105 MMOL/L
CLARITY UR: ABNORMAL
CO2 SERPL-SCNC: 20 MMOL/L
COLOR UR: ABNORMAL
CREAT SERPL-MCNC: 0.8 MG/DL
CREAT UR-MCNC: 25 MG/DL
DIFFERENTIAL METHOD: ABNORMAL
EOSINOPHIL # BLD AUTO: 0.2 K/UL
EOSINOPHIL NFR BLD: 1.4 %
ERYTHROCYTE [DISTWIDTH] IN BLOOD BY AUTOMATED COUNT: 14 %
EST. GFR  (AFRICAN AMERICAN): >60 ML/MIN/1.73 M^2
EST. GFR  (NON AFRICAN AMERICAN): >60 ML/MIN/1.73 M^2
GLUCOSE SERPL-MCNC: 77 MG/DL
GLUCOSE UR QL STRIP: NEGATIVE
HCT VFR BLD AUTO: 32.1 %
HGB BLD-MCNC: 10.8 G/DL
HGB UR QL STRIP: NEGATIVE
KETONES UR QL STRIP: NEGATIVE
LEUKOCYTE ESTERASE UR QL STRIP: ABNORMAL
LYMPHOCYTES # BLD AUTO: 2.5 K/UL
LYMPHOCYTES NFR BLD: 20 %
MCH RBC QN AUTO: 31.2 PG
MCHC RBC AUTO-ENTMCNC: 33.6 %
MCV RBC AUTO: 93 FL
MICROSCOPIC COMMENT: ABNORMAL
MONOCYTES # BLD AUTO: 1.1 K/UL
MONOCYTES NFR BLD: 8.8 %
NEUTROPHILS # BLD AUTO: 8.7 K/UL
NEUTROPHILS NFR BLD: 69.1 %
NITRITE UR QL STRIP: NEGATIVE
PH UR STRIP: 7 [PH] (ref 5–8)
PLATELET # BLD AUTO: 207 K/UL
PMV BLD AUTO: 11.5 FL
POTASSIUM SERPL-SCNC: 4.1 MMOL/L
PROT SERPL-MCNC: 6.3 G/DL
PROT UR QL STRIP: NEGATIVE
PROT UR-MCNC: 13 MG/DL
PROT/CREAT RATIO, UR: 0.52
RBC # BLD AUTO: 3.46 M/UL
RBC #/AREA URNS HPF: 3 /HPF (ref 0–4)
SODIUM SERPL-SCNC: 133 MMOL/L
SP GR UR STRIP: <=1.005 (ref 1–1.03)
SQUAMOUS #/AREA URNS HPF: 12 /HPF
URN SPEC COLLECT METH UR: ABNORMAL
WBC # BLD AUTO: 12.55 K/UL
WBC #/AREA URNS HPF: 30 /HPF (ref 0–5)

## 2017-04-07 PROCEDURE — 36415 COLL VENOUS BLD VENIPUNCTURE: CPT | Mod: PO

## 2017-04-07 PROCEDURE — 0502F SUBSEQUENT PRENATAL CARE: CPT | Mod: S$GLB,,, | Performed by: ADVANCED PRACTICE MIDWIFE

## 2017-04-07 PROCEDURE — 99999 PR PBB SHADOW E&M-EST. PATIENT-LVL II: CPT | Mod: PBBFAC,,, | Performed by: ADVANCED PRACTICE MIDWIFE

## 2017-04-07 PROCEDURE — 80053 COMPREHEN METABOLIC PANEL: CPT

## 2017-04-07 PROCEDURE — 84156 ASSAY OF PROTEIN URINE: CPT

## 2017-04-07 PROCEDURE — 85025 COMPLETE CBC W/AUTO DIFF WBC: CPT

## 2017-04-07 PROCEDURE — 81000 URINALYSIS NONAUTO W/SCOPE: CPT | Mod: PO

## 2017-04-07 NOTE — PROGRESS NOTES
Doing well, denies problems. Denies s/sx preE. BP elevated today. Will get baseline CMP and PCR today. Gave preE precautions and instructed when to go to the hospital. GBS next visit.

## 2017-04-07 NOTE — MR AVS SNAPSHOT
[facility] - [facility]/ [facility]  [address] [place] [address]  Phone: [phone]  Fax: [phone]                  Nathalie Roland   2017 1:30 PM   Routine Prenatal    Description:  Female : 1990   Provider:  Mckenzie Metcalf CNM   [facility]  [facility] - [facility]           Reason for Visit     Routine Prenatal Visit           Diagnoses this Visit        Comments    Polycystic kidney disease    -  Primary            To Do List           Future Appointments        Provider Department Dept Phone    2017 2:15 PM LABORATORY, [facility] [phone]    2017 3:15 PM Mckenzie Metcalf CNM [facility] OB/ -[phone]    2017 9:55 AM LABORATORY, [facility] [phone]    2017 10:00 AM SPECIMEN, [facility] [phone]    2017 3:00 PM Mee Rabago MD [facility] Nephrology [phone]      Goals (5 Years of Data)     None      [facility] On Call     [facility] Nurse Care Line -  Assistance  Unless otherwise directed by your provider, please contact [facility], our nurse care line that is available for  assistance.     Registered nurses in the [facility] Center provide: appointment scheduling, clinical advisement, health education, and other advisory services.  Call: [phone] (toll free)               Medications           Message regarding Medications     Verify the changes and/or additions to your medication regime listed below are the same as discussed with your clinician today.  If any of these changes or additions are incorrect, please notify your healthcare provider.             Verify that the below list of medications is an accurate representation of the medications you are currently taking.  If none reported, the list may be blank. If incorrect, please contact your healthcare provider. Carry this list with you in case of emergency.           Current Medications      butalbital-acetaminophen-caffeine -40 mg (FIORICET) -40 mg per tablet Take 1-2 tablets by mouth every 6 (six) hours as needed for Headaches.    methyldopa (ALDOMET) 250 MG tablet Take 2 tablets (500 mg total) by mouth every 12 (twelve) hours.           Clinical Reference Information           Prenatal Vitals     Enc. Date GA Prenatal Vitals Prenatal Pulse Pain Level Urine Albumin/Glucose Edema Presentation Dilation/Effacement/Station    4/7/17 33w3d 126/104 (A) / 88 kg (194 lb 0.1 oz) 32 cm / 145 / Present  0  None / None / None      3/17/17 30w3d 124/86 / 86.6 kg (190 lb 14.7 oz)  / 159 / Present  3 Negative / Negative None / None / None / No      3/3/17 28w3d 118/62 / 84.6 kg (186 lb 8.2 oz) 27 cm / 157 / Present  0  None / None / None      2/2/17 24w2d 134/88 / 82.6 kg (182 lb 1.6 oz) 24.5 cm / us 162 / Present  0  None / None      12/29/16 19w2d 112/70 / 80.7 kg (178 lb)  / 148 / Present          12/23/16 18w3d 144/92 (A) / 82.1 kg (181 lb)  / 163 / Present          12/1/16 15w2d 128/76 / 79.8 kg (176 lb)  / 160  / Absent  0  None / None      11/3/16 11w2d 138/88 / 78 kg (171 lb 15.3 oz)   0           TWG: 10.9 kg (24 lb 0.1 oz)   Pregravid weight: 77.1 kg (170 lb)   Number of fetuses: 1       Your Vitals Were     BP Weight Last Period BMI       126/104 88 kg (194 lb 0.1 oz) 08/16/2016 (Exact Date) 30.39 kg/m2       Allergies as of 4/7/2017     No Known Allergies      Immunizations Administered on Date of Encounter - 4/7/2017     None      Orders Placed During Today's Visit      Normal Orders This Visit    Protein / creatinine ratio, urine     Urinalysis     Future Labs/Procedures Expected by Expires    CBC auto differential  4/7/2017 4/7/2018    Comprehensive metabolic panel  4/7/2017 6/6/2018      Language Assistance Services     ATTENTION: Language assistance services are available, free of charge. Please call [phone].      ATENCIÓN: Si consuelo willard, tiene a bernal disposición servicios  xavios de asistencia lingüística. Barbra flores [phone].     [facility] Ý: N?u b?n nói Ti?ng Vi?t, có các d?ch v? h? tr? ngôn ng? mi?n phí dành cho b?n. G?i s? [phone].         [facility] - [facility]/ G[facility] complies with applicable Federal civil rights laws and does not discriminate on the basis of race, color, national origin, age, disability, or sex.

## 2017-04-19 ENCOUNTER — ROUTINE PRENATAL (OUTPATIENT)
Dept: OBSTETRICS AND GYNECOLOGY | Facility: CLINIC | Age: 27
End: 2017-04-19
Payer: COMMERCIAL

## 2017-04-19 ENCOUNTER — HOSPITAL ENCOUNTER (OUTPATIENT)
Facility: HOSPITAL | Age: 27
Discharge: HOME | End: 2017-04-19
Attending: OBSTETRICS & GYNECOLOGY | Admitting: OBSTETRICS & GYNECOLOGY
Payer: COMMERCIAL

## 2017-04-19 VITALS
OXYGEN SATURATION: 97 % | DIASTOLIC BLOOD PRESSURE: 85 MMHG | HEART RATE: 126 BPM | WEIGHT: 200.63 LBS | BODY MASS INDEX: 31.49 KG/M2 | SYSTOLIC BLOOD PRESSURE: 131 MMHG | HEIGHT: 67 IN | RESPIRATION RATE: 18 BRPM

## 2017-04-19 VITALS
SYSTOLIC BLOOD PRESSURE: 132 MMHG | DIASTOLIC BLOOD PRESSURE: 98 MMHG | BODY MASS INDEX: 31.42 KG/M2 | WEIGHT: 200.63 LBS

## 2017-04-19 DIAGNOSIS — Q61.3 POLYCYSTIC KIDNEY DISEASE: Primary | ICD-10-CM

## 2017-04-19 DIAGNOSIS — Z34.03 ENCOUNTER FOR SUPERVISION OF NORMAL FIRST PREGNANCY IN THIRD TRIMESTER: Primary | ICD-10-CM

## 2017-04-19 DIAGNOSIS — I10 ESSENTIAL HYPERTENSION: ICD-10-CM

## 2017-04-19 LAB
ALBUMIN SERPL BCP-MCNC: 2.7 G/DL
ALP SERPL-CCNC: 84 U/L
ALT SERPL W/O P-5'-P-CCNC: 20 U/L
ANION GAP SERPL CALC-SCNC: 14 MMOL/L
AST SERPL-CCNC: 16 U/L
BASOPHILS # BLD AUTO: 0.02 K/UL
BASOPHILS NFR BLD: 0.2 %
BILIRUB SERPL-MCNC: 0.4 MG/DL
BUN SERPL-MCNC: 13 MG/DL
CALCIUM SERPL-MCNC: 9.2 MG/DL
CHLORIDE SERPL-SCNC: 108 MMOL/L
CO2 SERPL-SCNC: 17 MMOL/L
CREAT SERPL-MCNC: 0.8 MG/DL
CREAT UR-MCNC: 22.2 MG/DL
DIFFERENTIAL METHOD: ABNORMAL
EOSINOPHIL # BLD AUTO: 0.1 K/UL
EOSINOPHIL NFR BLD: 0.8 %
ERYTHROCYTE [DISTWIDTH] IN BLOOD BY AUTOMATED COUNT: 13.9 %
EST. GFR  (AFRICAN AMERICAN): >60 ML/MIN/1.73 M^2
EST. GFR  (NON AFRICAN AMERICAN): >60 ML/MIN/1.73 M^2
GLUCOSE SERPL-MCNC: 122 MG/DL
HCT VFR BLD AUTO: 31.6 %
HGB BLD-MCNC: 10.7 G/DL
LYMPHOCYTES # BLD AUTO: 1.8 K/UL
LYMPHOCYTES NFR BLD: 16.2 %
MCH RBC QN AUTO: 31 PG
MCHC RBC AUTO-ENTMCNC: 33.9 %
MCV RBC AUTO: 92 FL
MONOCYTES # BLD AUTO: 1 K/UL
MONOCYTES NFR BLD: 8.7 %
NEUTROPHILS # BLD AUTO: 8.1 K/UL
NEUTROPHILS NFR BLD: 74.1 %
PLATELET # BLD AUTO: 176 K/UL
PMV BLD AUTO: 10.7 FL
POTASSIUM SERPL-SCNC: 3.7 MMOL/L
PROT SERPL-MCNC: 6.2 G/DL
PROT UR-MCNC: 10 MG/DL
PROT/CREAT RATIO, UR: 0.45
RBC # BLD AUTO: 3.45 M/UL
SODIUM SERPL-SCNC: 139 MMOL/L
WBC # BLD AUTO: 10.87 K/UL

## 2017-04-19 PROCEDURE — 99211 OFF/OP EST MAY X REQ PHY/QHP: CPT

## 2017-04-19 PROCEDURE — 84156 ASSAY OF PROTEIN URINE: CPT

## 2017-04-19 PROCEDURE — 59025 FETAL NON-STRESS TEST: CPT

## 2017-04-19 PROCEDURE — 59025 FETAL NON-STRESS TEST: CPT | Mod: 26,,, | Performed by: ADVANCED PRACTICE MIDWIFE

## 2017-04-19 PROCEDURE — 99213 OFFICE O/P EST LOW 20 MIN: CPT | Mod: 25,,, | Performed by: ADVANCED PRACTICE MIDWIFE

## 2017-04-19 PROCEDURE — 80053 COMPREHEN METABOLIC PANEL: CPT

## 2017-04-19 PROCEDURE — 99999 PR PBB SHADOW E&M-EST. PATIENT-LVL III: CPT | Mod: PBBFAC,,, | Performed by: ADVANCED PRACTICE MIDWIFE

## 2017-04-19 PROCEDURE — 0502F SUBSEQUENT PRENATAL CARE: CPT | Mod: S$GLB,,, | Performed by: ADVANCED PRACTICE MIDWIFE

## 2017-04-19 PROCEDURE — 87081 CULTURE SCREEN ONLY: CPT

## 2017-04-19 PROCEDURE — 85025 COMPLETE CBC W/AUTO DIFF WBC: CPT

## 2017-04-19 RX ORDER — ACETAMINOPHEN 500 MG
500 TABLET ORAL EVERY 6 HOURS PRN
Status: DISCONTINUED | OUTPATIENT
Start: 2017-04-19 | End: 2017-04-19 | Stop reason: HOSPADM

## 2017-04-19 RX ORDER — ONDANSETRON 8 MG/1
8 TABLET, ORALLY DISINTEGRATING ORAL EVERY 8 HOURS PRN
Status: DISCONTINUED | OUTPATIENT
Start: 2017-04-19 | End: 2017-04-19 | Stop reason: HOSPADM

## 2017-04-19 RX ORDER — METHYLDOPA 250 MG/1
500 TABLET, FILM COATED ORAL EVERY 6 HOURS
Qty: 120 TABLET | Refills: 6 | Status: ON HOLD | OUTPATIENT
Start: 2017-04-19 | End: 2017-05-05 | Stop reason: HOSPADM

## 2017-04-19 NOTE — PROGRESS NOTES
Doing ok, denies HA, blurred vision, RUQ pain, N/V. BP elevated again today. Reflexes 3+. Discussed with Dr. Keller, plan to send to hospital for PreE workup. GBS collected today.

## 2017-04-19 NOTE — IP AVS SNAPSHOT
[facility]  [id number] [place] [address] [address] [place] [address]           Patient Discharge Instructions   Our goal is to set you up for success. This packet includes information on your condition, medications, and your home care.  It will help you care for yourself to prevent having to return to the hospital.     Please ask your nurse if you have any questions.      There are many details to remember when preparing to leave the hospital. Here is what you will need to do:    1. Take your medicine. If you are prescribed medications, review your Medication List on the following pages. You may have new medications to  at the pharmacy and others that you'll need to stop taking. Review the instructions for how and when to take your medications. Talk with your doctor or nurses if you are unsure of what to do.     2. Go to your follow-up appointments. Specific follow-up information is listed in the following pages. Your may be contacted by a nurse or clinical provider about future appointments. Be sure we have all of the phone numbers to reach you. Please contact your provider's office if you are unable to make an appointment.     3. Watch for warning signs. Your doctor or nurse will give you detailed warning signs to watch for and when to call for assistance. These instructions may also include educational information about your condition. If you experience any of warning signs to your health, call your doctor.           [facility]  Unless otherwise directed by your provider, please   contact [facility], our nurse care line   that is available for 24/7 assistance.     [phone] (toll-free)     Registered nurses in the [facility]   provide: appointment scheduling, clinical advisement, health education, and other advisory services.                  ** Verify the list of medication(s) below is accurate and up to date. Carry this with you in case of emergency. If your  medications have changed, please notify your healthcare provider.             Medication List      CHANGE how you take these medications        Additional Info                      methyldopa 250 MG tablet   Commonly known as:  ALDOMET   Quantity:  120 tablet   Refills:  6   Dose:  500 mg   What changed:  when to take this    Instructions:  Take 2 tablets (500 mg total) by mouth every 6 (six) hours.     Begin Date    AM    Noon    PM    Bedtime         CONTINUE taking these medications        Additional Info                      butalbital-acetaminophen-caffeine -40 mg -40 mg per tablet   Commonly known as:  FIORICET   Quantity:  30 tablet   Refills:  1   Dose:  1-2 tablet    Instructions:  Take 1-2 tablets by mouth every 6 (six) hours as needed for Headaches.     Begin Date    AM    Noon    PM    Bedtime            Where to Get Your Medications      These medications were sent to [id number] - [place] - [address] AT [facility]  [address] [place] [address]    Hours:  24-hours Phone:  [phone]     methyldopa 250 MG tablet                  Please bring to all follow up appointments:    1. A copy of your discharge instructions.  2. All medicines you are currently taking in their original bottles.  3. Identification and insurance card.    Please arrive 15 minutes ahead of scheduled appointment time.    Please call 24 hours in advance if you must reschedule your appointment and/or time.        Your Scheduled Appointments     Apr 26, 2017  2:30 PM CDT   Routine Prenatal Visit with Elizabeth Begum CNM   [facility] - OB/ GYN ([facility])    [address] [place] [address] [phone]            May 18, 2017  9:55 AM CDT   Non-Fasting Lab with LABORATORY, [facility] ([facility])    [address] [place] [address] [phone]            May 18, 2017 10:00 AM CDT    Urine with SPECIMEN, [facility] ([facility]    [address] [place] [address] [phone]            May 23, 2017  3:00 PM CDT   Established Patient Visit with Mee Rabago MD   [facility] - Nephrology ([facility])    [address] [place] [address] [phone]              Follow-up Information     Follow up In 1 week.        Discharge Instructions     Future Orders    Activity as tolerated     Diet general     Questions:    Total calories:      Fat restriction, if any:      Protein restriction, if any:      Na restriction, if any:      Fluid restriction:      Additional restrictions:          Discharge Instructions        Discharge Instructions    Self Care Instructions:    Diet:  · Eat from the five basic food groups  · Fruits and proteins are good choices  · Limit fast foods and added salt/sugar  · Moderate carbonated and caffeine drinks    Hydration:  · Drink at least 8 large glasses of water a day    Kick Counts:  · After a meal, rest on your side and note the baby's movements until you have 8-10 movements in a 2 hour counting period.    · If you do not feel your baby move 8-10 times within 2 hours or you sense a change in the type or character of the baby's movement, you should come in to the hospital at once.  · Remember; your baby can sleep for 20-40 minutes at a time.      When to notify your provider:    · Vaginal bleeding like a period;  You may spot if we examined your cervix.  · If your water breaks, come to the birth center.  Note time, color and odor.  · Abdominal tenderness or pain that does not go away  · Contractions every 3 to 5 minutes for 1 to 2 hours.  True contractions move from front to back, are regular; usually get longer, stronger and closer together and do not stop if you change your position or activity.  · Any burning, urgency or frequency in relation to emptying your bladder.  · Any temperature greater than 100.4  "degrees, chills, flu-like symptoms            Primary Diagnosis     Your primary diagnosis was:  Indication For Care Or Intervention Related To Labor And Delivery      Admission Information     Date & Time Provider Department CSN    4/19/2017  6:16 PM Amalia Metcalf MD [facility] [id number]      Care Providers     Provider Role Specialty Primary office phone    Amalia Metcalf MD Attending Provider Obstetrics and Gynecology [phone]      Your Vitals Were     BP Pulse Resp Height Weight Last Period    117/85 126 18 5' 7" (1.702 m) 91 kg (200 lb 9.9 oz) 08/16/2016 (Exact Date)    SpO2 BMI             97% 31.42 kg/m2         Recent Lab Values     No lab values to display.      Allergies as of 4/19/2017     No Known Allergies      Advance Directives     An advance directive is a document which, in the event you are no longer able to make decisions for yourself, tells your healthcare team what kind of treatment you do or do not want to receive, or who you would like to make those decisions for you.  If you do not currently have an advance directive, [facility] encourages you to create one.  For more information call:  [phone]), [phone] ([phone]),  or log on to [email or web address].        Language Assistance Services     ATTENTION: Language assistance services are available, free of charge. Please call [phone].      ATENCIÓN: Si habla español, tiene a bernal disposición servicios gratuitos de asistencia lingüística. Llame al [phone].     CHÚ Ý: N?u b?n nói Ti?ng Vi?t, có các d?ch v? h? tr? ngôn ng? mi?n phí dành cho b?n. G?i s? [phone].         [facility] complies with applicable Federal civil rights laws and does not discriminate on the basis of race, color, national origin, age, disability, or sex.        "

## 2017-04-20 ENCOUNTER — PATIENT MESSAGE (OUTPATIENT)
Dept: OBSTETRICS AND GYNECOLOGY | Facility: CLINIC | Age: 27
End: 2017-04-20

## 2017-04-20 NOTE — SUBJECTIVE & OBJECTIVE
Obstetric HPI:  Patient reports none contractions, active fetal movement, Yes vaginal bleeding , Yes loss of fluid     This pregnancy has been complicated by essential hypertension and polycystic kidney disease, anemia    Obstetric History       T0      TAB0   SAB0   E0   M0   L0       # Outcome Date GA Lbr Kulwant/2nd Weight Sex Delivery Anes PTL Lv   1 Current                 Past Medical History:   Diagnosis Date    Anemia     Hypertension     renal hypertension    Polycystic kidney disease      Past Surgical History:   Procedure Laterality Date    eye cyst      HERNIA REPAIR      UMBILICAL HERNIA REPAIR         PTA Medications   Medication Sig    butalbital-acetaminophen-caffeine -40 mg (FIORICET) -40 mg per tablet Take 1-2 tablets by mouth every 6 (six) hours as needed for Headaches.    [DISCONTINUED] methyldopa (ALDOMET) 250 MG tablet Take 2 tablets (500 mg total) by mouth every 12 (twelve) hours.       Review of patient's allergies indicates:  No Known Allergies     Family History     Problem Relation (Age of Onset)    Cancer Mother, Paternal Grandmother    Heart attack Paternal Grandfather    Heart failure Paternal Grandmother    Hypertension Mother, Maternal Uncle, Maternal Grandfather, Paternal Grandmother, Paternal Grandfather    No Known Problems Father    Polycystic kidney disease Mother, Maternal Uncle, Maternal Grandfather        Social History Main Topics    Smoking status: Never Smoker    Smokeless tobacco: Never Used    Alcohol use 0.0 oz/week     0 Standard drinks or equivalent per week      Comment: socially,past     Drug use: No    Sexual activity: Yes     Partners: Male     Birth control/ protection: None     Review of Systems   Constitutional: Negative.    HENT: Negative.    Eyes: Negative.    Respiratory: Negative.    Cardiovascular: Negative.    Gastrointestinal: Negative.    Endocrine: Negative.    Genitourinary: Negative.    Musculoskeletal: Negative.     Skin:  Negative.   Neurological: Negative.    Hematological: Negative.    Psychiatric/Behavioral: Negative.    Breast: negative.    All other systems reviewed and are negative.     Objective:     Vital Signs (Most Recent):  Pulse: (!) 126 (04/19/17 1920)  Resp: 18 (04/19/17 1921)  BP: 117/85 (04/19/17 1950)  SpO2: 97 % (04/19/17 1950) Vital Signs (24h Range):  Pulse:  [101-147] 126  Resp:  [18] 18  SpO2:  [96 %-98 %] 97 %  BP: (117-150)/() 117/85     Weight: 91 kg (200 lb 9.9 oz)  Body mass index is 31.42 kg/(m^2).    FHT: 148Cat 1 (reassuring)  TOCO:  Q 60 minutes    Physical Exam    Cervix:  Dilation:  EffaStation: Presentation:      Significant Labs:  Lab Results   Component Value Date    GROUPTRH O POS 10/20/2016    HEPBSAG Negative 10/20/2016       CBC:   Recent Labs  Lab 04/19/17  1850   WBC 10.87   RBC 3.45*   HGB 10.7*   HCT 31.6*      MCV 92   MCH 31.0   MCHC 33.9     I have personallly reviewed all pertinent lab results from the last 24 hours.

## 2017-04-20 NOTE — ASSESSMENT & PLAN NOTE
A polycystic kidney disease  Essential hypertension  PIH labs stable  BP stable  P reviewed with Dr Metcalf  Patient home on modified bedrest  Increase aldomet to 500mg [id number]  Follow up 1 week  Patient to monitor BP at home

## 2017-04-24 LAB — BACTERIA SPEC AEROBE CULT: NORMAL

## 2017-04-25 NOTE — DISCHARGE SUMMARY
[facility] -   Obstetrics  Discharge Summary      Patient Name: Nathalie Roland  MRN: [id number]  Admission Date: 4/19/2017  Hospital Length of Stay: 0 days  Discharge Date and Time: 4/19/2017  9:00 PM  Attending Physician: Amalia Metcalf MD   Discharging Provider: Elizabeth Begum CNM  Primary Care Provider: Primary Doctor No    HPI: Patient here for elevated BP PIH labs and P/s ratio done and results reviewed with Dr Metcalf. Will continue to observe    * No surgery found *     Hospital Course:            Final Active Diagnoses:    Diagnosis Date Noted POA    PRINCIPAL PROBLEM:  Labor and delivery indication for care or intervention [O75.9] 04/19/2017 Yes      Problems Resolved During this Admission:    Diagnosis Date Noted Date Resolved POA        Labs: CMP No results for input(s): NA, K, CL, CO2, GLU, BUN, CREATININE, CALCIUM, PROT, ALBUMIN, BILITOT, ALKPHOS, AST, ALT, ANIONGAP, ESTGFRAFRICA, EGFRNONAA in the last 48 hours. and CBC No results for input(s): WBC, HGB, HCT, PLT in the last 48 hours.    Feeding Method: breast    Immunizations     None          This patient has no babies on file.  Pending Diagnostic Studies:     None          Discharged Condition: good    Disposition: Home or Self Care    Follow Up:  Follow-up Information     Follow up In 1 week.        Follow up In 1 week.        Patient Instructions:     Diet general     Activity as tolerated       Medications:  Discharge Medication List as of 4/19/2017  8:37 PM      CONTINUE these medications which have CHANGED    Details   methyldopa (ALDOMET) 250 MG tablet Take 2 tablets (500 mg total) by mouth every 6 (six) hours., Starting 4/19/2017, Until Thu 4/19/18, Normal         CONTINUE these medications which have NOT CHANGED    Details   butalbital-acetaminophen-caffeine -40 mg (FIORICET) -40 mg per tablet Take 1-2 tablets by mouth every 6 (six) hours as needed for Headaches., Starting 12/29/2016, Until Fri 12/29/17,  Casey Begum CNM  Obstetrics  [facility]

## 2017-04-25 NOTE — ASSESSMENT & PLAN NOTE
A polycystic kidney disease  Essential hypertension  PIH labs stable  BP stable  P reviewed with Dr Metcafl  Patient home on modified bedrest  Increase aldomet to 500mg [id number]  Follow up 1 week  Patient to monitor BP at home

## 2017-04-26 ENCOUNTER — ROUTINE PRENATAL (OUTPATIENT)
Dept: OBSTETRICS AND GYNECOLOGY | Facility: CLINIC | Age: 27
End: 2017-04-26
Payer: COMMERCIAL

## 2017-04-26 VITALS
BODY MASS INDEX: 30.59 KG/M2 | WEIGHT: 195.31 LBS | SYSTOLIC BLOOD PRESSURE: 128 MMHG | DIASTOLIC BLOOD PRESSURE: 100 MMHG

## 2017-04-26 DIAGNOSIS — O10.013 PRE-EXISTING ESSENTIAL HYPERTENSION DURING PREGNANCY IN THIRD TRIMESTER: ICD-10-CM

## 2017-04-26 DIAGNOSIS — Q61.3 POLYCYSTIC KIDNEY DISEASE: ICD-10-CM

## 2017-04-26 DIAGNOSIS — O09.93 SUPERVISION OF HIGH RISK PREGNANCY IN THIRD TRIMESTER: Primary | ICD-10-CM

## 2017-04-26 PROCEDURE — 99999 PR PBB SHADOW E&M-EST. PATIENT-LVL II: CPT | Mod: PBBFAC,,, | Performed by: ADVANCED PRACTICE MIDWIFE

## 2017-04-26 PROCEDURE — 0502F SUBSEQUENT PRENATAL CARE: CPT | Mod: S$GLB,,, | Performed by: ADVANCED PRACTICE MIDWIFE

## 2017-04-26 RX ORDER — IBUPROFEN 200 MG
800 TABLET ORAL EVERY 8 HOURS
Status: CANCELLED | OUTPATIENT
Start: 2017-04-27

## 2017-04-26 RX ORDER — OXYTOCIN/RINGER'S LACTATE 20/1000 ML
2 PLASTIC BAG, INJECTION (ML) INTRAVENOUS CONTINUOUS
Status: CANCELLED | OUTPATIENT
Start: 2017-04-26

## 2017-04-26 RX ORDER — MISOPROSTOL 100 MCG
25 TABLET ORAL EVERY 4 HOURS
Status: CANCELLED | OUTPATIENT
Start: 2017-04-26 | End: 2017-04-27

## 2017-04-26 RX ORDER — KETOROLAC TROMETHAMINE 30 MG/ML
30 INJECTION, SOLUTION INTRAMUSCULAR; INTRAVENOUS EVERY 6 HOURS
Status: CANCELLED | OUTPATIENT
Start: 2017-04-26 | End: 2017-04-27

## 2017-04-26 RX ORDER — SODIUM CHLORIDE, SODIUM LACTATE, POTASSIUM CHLORIDE, CALCIUM CHLORIDE 600; 310; 30; 20 MG/100ML; MG/100ML; MG/100ML; MG/100ML
INJECTION, SOLUTION INTRAVENOUS CONTINUOUS
Status: CANCELLED | OUTPATIENT
Start: 2017-04-26

## 2017-04-26 RX ORDER — ONDANSETRON 4 MG/1
8 TABLET, ORALLY DISINTEGRATING ORAL EVERY 8 HOURS PRN
Status: CANCELLED | OUTPATIENT
Start: 2017-04-26

## 2017-04-26 RX ORDER — OXYTOCIN/RINGER'S LACTATE 20/1000 ML
36 PLASTIC BAG, INJECTION (ML) INTRAVENOUS
Status: CANCELLED | OUTPATIENT
Start: 2017-04-26

## 2017-04-26 RX ORDER — TERBUTALINE SULFATE 1 MG/ML
0.25 INJECTION SUBCUTANEOUS
Status: CANCELLED | OUTPATIENT
Start: 2017-04-26

## 2017-04-26 NOTE — MR AVS SNAPSHOT
[facility] - OB/ GYN  [address] [facility] [address] [place] [address]  Phone: [phone]  Fax: [phone]                  Nathalie Roland   2017 2:30 PM   Routine Prenatal    Description:  Female : 1990   Provider:  Elizabeth Begum CNM   Department:  [facility] - OB/ [facility]           Reason for Visit     Routine Prenatal Visit           Diagnoses this Visit        Comments    Supervision of high risk pregnancy in third trimester    -  Primary     Polycystic kidney disease         Pre-existing essential hypertension during pregnancy in third trimester                To Do List           Future Appointments        Provider Department Dept Phone    2017 8:00 AM ULTRASOUND, OB-GYN [facility] OB/ [phone]    2017 9:55 AM LABORATORY, [facility] [phone]    2017 10:00 AM SPECIMEN, [facility] [phone]    2017 3:00 PM Mee Rabago MD [facility] Nephrology [phone]      Goals (5 Years of Data)     None      Follow-Up and Disposition     Return in about 1 year (around 2018).    Follow-up and Disposition History      [facility]     [facility] Nurse Care Line -  Assistance  Unless otherwise directed by your provider, please contact [facility], our nurse care line that is available for  assistance.     Registered nurses in the [facility] provide: appointment scheduling, clinical advisement, health education, and other advisory services.  Call: [phone] (toll free)               Medications           Message regarding Medications     Verify the changes and/or additions to your medication regime listed below are the same as discussed with your clinician today.  If any of these changes or additions are incorrect, please notify your healthcare provider.             Verify that the below list of medications is an accurate representation of the medications you are currently taking.  If  "none reported, the list may be blank. If incorrect, please contact your healthcare provider. Carry this list with you in case of emergency.           Current Medications     methyldopa (ALDOMET) 250 MG tablet Take 2 tablets (500 mg total) by mouth every 6 (six) hours.    butalbital-acetaminophen-caffeine -40 mg (FIORICET) -40 mg per tablet Take 1-2 tablets by mouth every 6 (six) hours as needed for Headaches.           Clinical Reference Information           Prenatal Vitals     Enc. Date GA Prenatal Vitals Prenatal Pulse Pain Level Urine Albumin/Glucose Edema Presentation Dilation/Effacement/Station    17 36w1d 128/100 (A) / 88.6 kg (195 lb 5.2 oz)  /  / Present  0        17 35w1d Admission Dept: [facility] L&D    17 35w1d 132/98 (A)           17 35w1d 150/94 (A) / 91 kg (200 lb 9.9 oz)  / 156 / Present  0 Trace / Negative None / None / None      17 33w3d 126/96 (A)           17 33w3d 126/104 (A) / 88 kg (194 lb 0.1 oz) 32 cm / 145 / Present  0 Trace / Negative None / None / None      3/17/17 30w3d 124/86 / 86.6 kg (190 lb 14.7 oz)  / 159 / Present  3 Negative / Negative None / None / None / No      3/3/17 28w3d 118/62 / 84.6 kg (186 lb 8.2 oz) 27 cm / 157 / Present  0  None / None / None      17 24w2d 134/88 / 82.6 kg (182 lb 1.6 oz) 24.5 cm / us 162 / Present  0  None / None      16 19w2d 112/70 / 80.7 kg (178 lb)  / 148 / Present          16 18w3d 144/92 (A) / 82.1 kg (181 lb)  / 163 / Present          16 15w2d 128/76 / 79.8 kg (176 lb)  / 160  / Absent  0  None / None      11/3/16 11w2d 138/88 / 78 kg (171 lb 15.3 oz)   0           TW.5 kg (25 lb 5.2 oz)   Pregravid weight: 77.1 kg (170 lb)   Number of fetuses: 1   Height: 5' 7" (1.702 m)   BMI: 26.6       Your Vitals Were     BP Weight Last Period BMI       128/100 88.6 kg (195 lb 5.2 oz) 2016 (Exact Date) 30.59 kg/m2       Allergies as of 2017     No Known Allergies      Immunizations " Administered on Date of Encounter - 4/26/2017     None      Orders Placed During Today's Visit     Future Labs/Procedures Expected by Expires    US OB/GYN Procedure ([facility])-Future  As directed 4/26/2018      Language Assistance Services     ATTENTION: Language assistance services are available, free of charge. Please call [phone].      ATENCIÓN: Si habla español, tiene a bernal disposición servicios gratuitos de asistencia lingüística. Llame al [phone].     CHÚ Ý: N?u b?n nói Ti?ng Vi?t, có các d?ch v? h? tr? ngôn ng? mi?n phí dành cho b?n. G?i s? [phone].         [facility]/ [facility] complies with applicable Federal civil rights laws and does not discriminate on the basis of race, color, national origin, age, disability, or sex.

## 2017-04-26 NOTE — PROGRESS NOTES
BP at home consistent 130/80's  Induction scheduled for 5/2 at 5am  Aware of PIH sx and when to go to hospital  Needs BPP ASAP

## 2017-04-27 ENCOUNTER — PROCEDURE VISIT (OUTPATIENT)
Dept: OBSTETRICS AND GYNECOLOGY | Facility: CLINIC | Age: 27
End: 2017-04-27
Payer: COMMERCIAL

## 2017-04-27 DIAGNOSIS — O09.93 SUPERVISION OF HIGH RISK PREGNANCY IN THIRD TRIMESTER: ICD-10-CM

## 2017-04-27 PROCEDURE — 76819 FETAL BIOPHYS PROFIL W/O NST: CPT | Mod: 26,S$GLB,, | Performed by: OBSTETRICS & GYNECOLOGY

## 2017-04-27 PROCEDURE — 76816 OB US FOLLOW-UP PER FETUS: CPT | Mod: 26,S$GLB,, | Performed by: OBSTETRICS & GYNECOLOGY

## 2017-04-28 ENCOUNTER — ROUTINE PRENATAL (OUTPATIENT)
Dept: OBSTETRICS AND GYNECOLOGY | Facility: CLINIC | Age: 27
End: 2017-04-28
Payer: COMMERCIAL

## 2017-04-28 VITALS
WEIGHT: 193.81 LBS | SYSTOLIC BLOOD PRESSURE: 132 MMHG | DIASTOLIC BLOOD PRESSURE: 92 MMHG | BODY MASS INDEX: 30.35 KG/M2

## 2017-04-28 DIAGNOSIS — F41.9 ANXIETY: Primary | ICD-10-CM

## 2017-04-28 DIAGNOSIS — O10.919 CHRONIC HYPERTENSION AFFECTING PREGNANCY: ICD-10-CM

## 2017-04-28 PROCEDURE — 0502F SUBSEQUENT PRENATAL CARE: CPT | Mod: S$GLB,,, | Performed by: ADVANCED PRACTICE MIDWIFE

## 2017-04-28 PROCEDURE — 99999 PR PBB SHADOW E&M-EST. PATIENT-LVL II: CPT | Mod: PBBFAC,,, | Performed by: ADVANCED PRACTICE MIDWIFE

## 2017-04-28 PROCEDURE — 96372 THER/PROPH/DIAG INJ SC/IM: CPT | Mod: S$GLB,,, | Performed by: ADVANCED PRACTICE MIDWIFE

## 2017-04-28 RX ORDER — PROMETHAZINE HYDROCHLORIDE 25 MG/ML
25 INJECTION, SOLUTION INTRAMUSCULAR; INTRAVENOUS
Status: COMPLETED | OUTPATIENT
Start: 2017-04-28 | End: 2017-04-28

## 2017-04-28 RX ORDER — PROMETHAZINE HYDROCHLORIDE 25 MG/1
25 TABLET ORAL EVERY 6 HOURS PRN
Qty: 20 TABLET | Refills: 0 | Status: SHIPPED | OUTPATIENT
Start: 2017-04-28 | End: 2017-05-23

## 2017-04-28 RX ADMIN — PROMETHAZINE HYDROCHLORIDE 25 MG: 25 INJECTION, SOLUTION INTRAMUSCULAR; INTRAVENOUS at 12:04

## 2017-04-28 NOTE — PROGRESS NOTES
Here for elevated BP from home. Pt very anxious and worried about preE and worried about her baby. BP slightly elevated, but HR 140s. Pt tearful,  at bedside. Explained that BP not severe. Will plan for IOL as scheduled on Tuesday @ 0200 (for cervical ripening). Phenergan IM to help her sleep.  bringing her home to rest.

## 2017-04-28 NOTE — MR AVS SNAPSHOT
[facility] - OB/ GYN  [address]  [place] [address]  Phone: [phone]  Fax: [phone]                  Nathalie Roland   2017 11:45 AM   Routine Prenatal    Description:  Female : 1990   Provider:  Mckenzie Metcalf CNM   Department:  [facility] - OB/ GYN           Reason for Visit     Routine Prenatal Visit           Diagnoses this Visit        Comments    Anxiety    -  Primary            To Do List           Future Appointments        Provider Department Dept Phone    2017 9:55 AM LABORATORY, [facility] [phone]    2017 10:00 AM SPECIMEN, [facility] [phone]    2017 3:00 PM Mee Rabago MD [facility] Nephrology [phone]      Goals (5 Years of Data)     None       These Medications        Disp Refills Start End    promethazine (PHENERGAN) 25 MG tablet 20 tablet 0 2017     Take 1 tablet (25 mg total) by mouth every 6 (six) hours as needed for Nausea. - Oral    [facility] [address] [address] Ph #: [phone]         [facility]     [facility] Nurse Care Line -  Assistance  Unless otherwise directed by your provider, please contact [facility], our nurse care line that is available for  assistance.     Registered nurses in the [facility] provide: appointment scheduling, clinical advisement, health education, and other advisory services.  Call: [phone] (toll free)               Medications           Message regarding Medications     Verify the changes and/or additions to your medication regime listed below are the same as discussed with your clinician today.  If any of these changes or additions are incorrect, please notify your healthcare provider.        START taking these NEW medications        Refills    promethazine (PHENERGAN) 25 MG tablet 0    Sig: Take 1 tablet (25 mg total) by mouth every 6 (six)  hours as needed for Nausea.    Class: Normal    Route: Oral      These medications were administered today        Dose Freq    promethazine injection 25 mg 25 mg Clinic/HOD 1 time    Sig: Inject 1 mL (25 mg total) into the muscle one time.    Class: Normal    Route: Intramuscular           Verify that the below list of medications is an accurate representation of the medications you are currently taking.  If none reported, the list may be blank. If incorrect, please contact your healthcare provider. Carry this list with you in case of emergency.           Current Medications     butalbital-acetaminophen-caffeine -40 mg (FIORICET) -40 mg per tablet Take 1-2 tablets by mouth every 6 (six) hours as needed for Headaches.    methyldopa (ALDOMET) 250 MG tablet Take 2 tablets (500 mg total) by mouth every 6 (six) hours.    promethazine (PHENERGAN) 25 MG tablet Take 1 tablet (25 mg total) by mouth every 6 (six) hours as needed for Nausea.           Clinical Reference Information           Prenatal Vitals     Enc. Date GA Prenatal Vitals Prenatal Pulse Pain Level Urine Albumin/Glucose Edema Presentation Dilation/Effacement/Station    4/28/17 36w3d 132/92 (A) / 87.9 kg (193 lb 12.6 oz)  /  / Present          4/26/17 36w1d 128/100 (A) / 88.6 kg (195 lb 5.2 oz)  /  / Present  0        4/19/17 35w1d Admission Dept: [facility] L&D    4/19/17 35w1d 132/98 (A)           4/19/17 35w1d 150/94 (A) / 91 kg (200 lb 9.9 oz)  / 156 / Present  0 Trace / Negative None / None / None      4/7/17 33w3d 126/96 (A)           4/7/17 33w3d 126/104 (A) / 88 kg (194 lb 0.1 oz) 32 cm / 145 / Present  0 Trace / Negative None / None / None      3/17/17 30w3d 124/86 / 86.6 kg (190 lb 14.7 oz)  / 159 / Present  3 Negative / Negative None / None / None / No      3/3/17 28w3d 118/62 / 84.6 kg (186 lb 8.2 oz) 27 cm / 157 / Present  0  None / None / None      2/2/17 24w2d 134/88 / 82.6 kg (182 lb 1.6 oz) 24.5 cm / us 162 / Present  0  None / None       "12/29/16 19w2d 112/70 / 80.7 kg (178 lb)  / 148 / Present          12/23/16 18w3d 144/92 (A) / 82.1 kg (181 lb)  / 163 / Present          12/1/16 15w2d 128/76 / 79.8 kg (176 lb)  / 160  / Absent  0  None / None      11/3/16 11w2d 138/88 / 78 kg (171 lb 15.3 oz)   0           TWG: 10.8 kg (23 lb 12.6 oz)   Pregravid weight: 77.1 kg (170 lb)   Number of fetuses: 1   Height: 5' 7" (1.702 m)   BMI: 26.6       Your Vitals Were     BP Weight Last Period BMI       132/92 87.9 kg (193 lb 12.6 oz) 08/16/2016 (Exact Date) 30.35 kg/m2       Allergies as of 4/28/2017     No Known Allergies      Immunizations Administered on Date of Encounter - 4/28/2017     None      Language Assistance Services     ATTENTION: Language assistance services are available, free of charge. Please call [phone].      ATENCIÓN: Si habla sudheer, tiene a bernal disposición servicios gratuitos de asistencia lingüística. Llame al [phone].     ISADORA Ý: N?u b?n nói Ti?ng Vi?t, có các d?ch v? h? tr? ngôn ng? mi?n phí dành cho b?n. G?i s? [phone].         [facility] complies with applicable Federal civil rights laws and does not discriminate on the basis of race, color, national origin, age, disability, or sex.        "

## 2017-05-01 RX ORDER — MISOPROSTOL 100 MCG
25 TABLET ORAL EVERY 4 HOURS
Status: CANCELLED | OUTPATIENT
Start: 2017-05-01 | End: 2017-05-02

## 2017-05-01 RX ORDER — TERBUTALINE SULFATE 1 MG/ML
0.25 INJECTION SUBCUTANEOUS
Status: CANCELLED | OUTPATIENT
Start: 2017-05-01

## 2017-05-01 RX ORDER — ONDANSETRON 4 MG/1
8 TABLET, ORALLY DISINTEGRATING ORAL EVERY 8 HOURS PRN
Status: CANCELLED | OUTPATIENT
Start: 2017-05-01

## 2017-05-01 RX ORDER — SODIUM CHLORIDE, SODIUM LACTATE, POTASSIUM CHLORIDE, CALCIUM CHLORIDE 600; 310; 30; 20 MG/100ML; MG/100ML; MG/100ML; MG/100ML
INJECTION, SOLUTION INTRAVENOUS CONTINUOUS
Status: CANCELLED | OUTPATIENT
Start: 2017-05-01

## 2017-05-01 RX ORDER — MISOPROSTOL 100 UG/1
600 TABLET ORAL
Status: CANCELLED | OUTPATIENT
Start: 2017-05-01

## 2017-05-02 ENCOUNTER — ANESTHESIA EVENT (OUTPATIENT)
Dept: OBSTETRICS AND GYNECOLOGY | Facility: HOSPITAL | Age: 27
End: 2017-05-02
Payer: COMMERCIAL

## 2017-05-02 ENCOUNTER — ANESTHESIA (OUTPATIENT)
Dept: OBSTETRICS AND GYNECOLOGY | Facility: HOSPITAL | Age: 27
End: 2017-05-02
Payer: COMMERCIAL

## 2017-05-02 ENCOUNTER — HOSPITAL ENCOUNTER (INPATIENT)
Facility: HOSPITAL | Age: 27
LOS: 3 days | Discharge: HOME | End: 2017-05-05
Attending: OBSTETRICS & GYNECOLOGY | Admitting: OBSTETRICS & GYNECOLOGY
Payer: COMMERCIAL

## 2017-05-02 DIAGNOSIS — F41.9 ANXIETY: ICD-10-CM

## 2017-05-02 DIAGNOSIS — O09.93 SUPERVISION OF HIGH RISK PREGNANCY IN THIRD TRIMESTER: ICD-10-CM

## 2017-05-02 DIAGNOSIS — Z98.891 STATUS POST CESAREAN SECTION: Primary | ICD-10-CM

## 2017-05-02 DIAGNOSIS — R00.0 TACHYCARDIA: ICD-10-CM

## 2017-05-02 DIAGNOSIS — O10.919 CHRONIC HYPERTENSION AFFECTING PREGNANCY: ICD-10-CM

## 2017-05-02 LAB
ABO + RH BLD: NORMAL
ALBUMIN SERPL BCP-MCNC: 2.5 G/DL
ALP SERPL-CCNC: 92 U/L
ALT SERPL W/O P-5'-P-CCNC: 18 U/L
ANION GAP SERPL CALC-SCNC: 10 MMOL/L
AST SERPL-CCNC: 15 U/L
BASOPHILS # BLD AUTO: 0.01 K/UL
BASOPHILS NFR BLD: 0.1 %
BILIRUB SERPL-MCNC: 0.6 MG/DL
BLD GP AB SCN CELLS X3 SERPL QL: NORMAL
BUN SERPL-MCNC: 9 MG/DL
CALCIUM SERPL-MCNC: 8.8 MG/DL
CHLORIDE SERPL-SCNC: 109 MMOL/L
CO2 SERPL-SCNC: 19 MMOL/L
CREAT SERPL-MCNC: 0.8 MG/DL
CREAT UR-MCNC: 44.1 MG/DL
DIFFERENTIAL METHOD: ABNORMAL
EOSINOPHIL # BLD AUTO: 0.2 K/UL
EOSINOPHIL NFR BLD: 1.4 %
ERYTHROCYTE [DISTWIDTH] IN BLOOD BY AUTOMATED COUNT: 14 %
EST. GFR  (AFRICAN AMERICAN): >60 ML/MIN/1.73 M^2
EST. GFR  (NON AFRICAN AMERICAN): >60 ML/MIN/1.73 M^2
GLUCOSE SERPL-MCNC: 88 MG/DL
HCT VFR BLD AUTO: 35.6 %
HGB BLD-MCNC: 12.2 G/DL
LYMPHOCYTES # BLD AUTO: 2.2 K/UL
LYMPHOCYTES NFR BLD: 20.5 %
MCH RBC QN AUTO: 31.3 PG
MCHC RBC AUTO-ENTMCNC: 34.3 %
MCV RBC AUTO: 91 FL
MONOCYTES # BLD AUTO: 1.2 K/UL
MONOCYTES NFR BLD: 11.4 %
NEUTROPHILS # BLD AUTO: 7 K/UL
NEUTROPHILS NFR BLD: 66.6 %
PLATELET # BLD AUTO: 201 K/UL
PMV BLD AUTO: 11.1 FL
POTASSIUM SERPL-SCNC: 4.4 MMOL/L
PROT SERPL-MCNC: 6.1 G/DL
PROT UR-MCNC: 20 MG/DL
PROT/CREAT RATIO, UR: 0.45
RBC # BLD AUTO: 3.9 M/UL
SODIUM SERPL-SCNC: 138 MMOL/L
WBC # BLD AUTO: 10.57 K/UL

## 2017-05-02 PROCEDURE — 82570 ASSAY OF URINE CREATININE: CPT

## 2017-05-02 PROCEDURE — 63600175 PHARM REV CODE 636 W HCPCS: Performed by: NURSE ANESTHETIST, CERTIFIED REGISTERED

## 2017-05-02 PROCEDURE — 11000001 HC ACUTE MED/SURG PRIVATE ROOM

## 2017-05-02 PROCEDURE — 72100003 HC LABOR CARE, EA. ADDL. 8 HRS

## 2017-05-02 PROCEDURE — 85025 COMPLETE CBC W/AUTO DIFF WBC: CPT

## 2017-05-02 PROCEDURE — 86901 BLOOD TYPING SEROLOGIC RH(D): CPT

## 2017-05-02 PROCEDURE — 27800517 HC TRAY,EPIDURAL-CONTINUOUS: Performed by: NURSE ANESTHETIST, CERTIFIED REGISTERED

## 2017-05-02 PROCEDURE — 86900 BLOOD TYPING SEROLOGIC ABO: CPT

## 2017-05-02 PROCEDURE — 25000003 PHARM REV CODE 250: Performed by: ADVANCED PRACTICE MIDWIFE

## 2017-05-02 PROCEDURE — 63600175 PHARM REV CODE 636 W HCPCS: Performed by: ADVANCED PRACTICE MIDWIFE

## 2017-05-02 PROCEDURE — 25000003 PHARM REV CODE 250: Performed by: NURSE ANESTHETIST, CERTIFIED REGISTERED

## 2017-05-02 PROCEDURE — 80053 COMPREHEN METABOLIC PANEL: CPT

## 2017-05-02 PROCEDURE — 62326 NJX INTERLAMINAR LMBR/SAC: CPT | Performed by: ANESTHESIOLOGY

## 2017-05-02 PROCEDURE — 3E0P7GC INTRODUCTION OF OTHER THERAPEUTIC SUBSTANCE INTO FEMALE REPRODUCTIVE, VIA NATURAL OR ARTIFICIAL OPENING: ICD-10-PCS | Performed by: ADVANCED PRACTICE MIDWIFE

## 2017-05-02 PROCEDURE — 25000003 PHARM REV CODE 250: Performed by: OBSTETRICS & GYNECOLOGY

## 2017-05-02 PROCEDURE — 72100002 HC LABOR CARE, 1ST 8 HOURS

## 2017-05-02 RX ORDER — SODIUM CHLORIDE, SODIUM LACTATE, POTASSIUM CHLORIDE, CALCIUM CHLORIDE 600; 310; 30; 20 MG/100ML; MG/100ML; MG/100ML; MG/100ML
INJECTION, SOLUTION INTRAVENOUS CONTINUOUS
Status: DISCONTINUED | OUTPATIENT
Start: 2017-05-02 | End: 2017-05-04

## 2017-05-02 RX ORDER — MEPERIDINE HYDROCHLORIDE 50 MG/ML
50 INJECTION INTRAMUSCULAR; INTRAVENOUS; SUBCUTANEOUS
Status: ACTIVE | OUTPATIENT
Start: 2017-05-02 | End: 2017-05-03

## 2017-05-02 RX ORDER — ONDANSETRON 8 MG/1
8 TABLET, ORALLY DISINTEGRATING ORAL EVERY 8 HOURS PRN
Status: DISCONTINUED | OUTPATIENT
Start: 2017-05-02 | End: 2017-05-02

## 2017-05-02 RX ORDER — METOCLOPRAMIDE HYDROCHLORIDE 5 MG/ML
10 INJECTION INTRAMUSCULAR; INTRAVENOUS ONCE
Status: DISCONTINUED | OUTPATIENT
Start: 2017-05-03 | End: 2017-05-05 | Stop reason: HOSPADM

## 2017-05-02 RX ORDER — MISOPROSTOL 100 MCG
25 TABLET ORAL EVERY 4 HOURS
Status: DISCONTINUED | OUTPATIENT
Start: 2017-05-02 | End: 2017-05-02

## 2017-05-02 RX ORDER — FAMOTIDINE 10 MG/ML
20 INJECTION INTRAVENOUS ONCE
Status: DISCONTINUED | OUTPATIENT
Start: 2017-05-03 | End: 2017-05-04

## 2017-05-02 RX ORDER — SODIUM CHLORIDE, SODIUM LACTATE, POTASSIUM CHLORIDE, CALCIUM CHLORIDE 600; 310; 30; 20 MG/100ML; MG/100ML; MG/100ML; MG/100ML
INJECTION, SOLUTION INTRAVENOUS CONTINUOUS
Status: DISCONTINUED | OUTPATIENT
Start: 2017-05-02 | End: 2017-05-02

## 2017-05-02 RX ORDER — IBUPROFEN 400 MG/1
800 TABLET ORAL EVERY 8 HOURS
Status: DISCONTINUED | OUTPATIENT
Start: 2017-05-03 | End: 2017-05-02

## 2017-05-02 RX ORDER — ROPIVACAINE HYDROCHLORIDE 2 MG/ML
INJECTION, SOLUTION EPIDURAL; INFILTRATION; PERINEURAL
Status: DISCONTINUED | OUTPATIENT
Start: 2017-05-02 | End: 2017-05-03

## 2017-05-02 RX ORDER — KETOROLAC TROMETHAMINE 30 MG/ML
30 INJECTION, SOLUTION INTRAMUSCULAR; INTRAVENOUS EVERY 6 HOURS
Status: DISCONTINUED | OUTPATIENT
Start: 2017-05-02 | End: 2017-05-02

## 2017-05-02 RX ORDER — ROPIVACAINE HYDROCHLORIDE 2 MG/ML
INJECTION, SOLUTION EPIDURAL; INFILTRATION; PERINEURAL CONTINUOUS PRN
Status: DISCONTINUED | OUTPATIENT
Start: 2017-05-02 | End: 2017-05-03

## 2017-05-02 RX ORDER — OXYTOCIN/RINGER'S LACTATE 20/1000 ML
2 PLASTIC BAG, INJECTION (ML) INTRAVENOUS CONTINUOUS
Status: DISCONTINUED | OUTPATIENT
Start: 2017-05-02 | End: 2017-05-04

## 2017-05-02 RX ORDER — LIDOCAINE HYDROCHLORIDE AND EPINEPHRINE 15; 5 MG/ML; UG/ML
INJECTION, SOLUTION EPIDURAL
Status: DISCONTINUED | OUTPATIENT
Start: 2017-05-02 | End: 2017-05-03

## 2017-05-02 RX ORDER — METHYLDOPA 250 MG/1
500 TABLET, FILM COATED ORAL ONCE
Status: COMPLETED | OUTPATIENT
Start: 2017-05-02 | End: 2017-05-02

## 2017-05-02 RX ORDER — ONDANSETRON 8 MG/1
8 TABLET, ORALLY DISINTEGRATING ORAL EVERY 8 HOURS PRN
Status: DISCONTINUED | OUTPATIENT
Start: 2017-05-02 | End: 2017-05-03

## 2017-05-02 RX ORDER — TERBUTALINE SULFATE 1 MG/ML
0.25 INJECTION SUBCUTANEOUS
Status: DISCONTINUED | OUTPATIENT
Start: 2017-05-02 | End: 2017-05-02

## 2017-05-02 RX ORDER — ROPIVACAINE IN 0.9% SOD CHL/PF 0.2 %
PLASTIC BAG, INJECTION (ML) EPIDURAL CONTINUOUS
Status: DISCONTINUED | OUTPATIENT
Start: 2017-05-03 | End: 2017-05-04

## 2017-05-02 RX ORDER — MISOPROSTOL 200 UG/1
600 TABLET ORAL
Status: DISCONTINUED | OUTPATIENT
Start: 2017-05-02 | End: 2017-05-04

## 2017-05-02 RX ORDER — HYDROXYZINE PAMOATE 25 MG/1
25 CAPSULE ORAL EVERY 6 HOURS
Status: DISCONTINUED | OUTPATIENT
Start: 2017-05-02 | End: 2017-05-05 | Stop reason: HOSPADM

## 2017-05-02 RX ORDER — OXYTOCIN/RINGER'S LACTATE 20/1000 ML
36 PLASTIC BAG, INJECTION (ML) INTRAVENOUS
Status: DISCONTINUED | OUTPATIENT
Start: 2017-05-02 | End: 2017-05-04

## 2017-05-02 RX ORDER — SODIUM CITRATE AND CITRIC ACID MONOHYDRATE 334; 500 MG/5ML; MG/5ML
30 SOLUTION ORAL ONCE
Status: COMPLETED | OUTPATIENT
Start: 2017-05-03 | End: 2017-05-03

## 2017-05-02 RX ORDER — HYDROXYZINE PAMOATE 50 MG/1
50 CAPSULE ORAL ONCE
Status: COMPLETED | OUTPATIENT
Start: 2017-05-02 | End: 2017-05-02

## 2017-05-02 RX ORDER — OXYCODONE AND ACETAMINOPHEN 10; 325 MG/1; MG/1
1 TABLET ORAL EVERY 6 HOURS PRN
Status: DISCONTINUED | OUTPATIENT
Start: 2017-05-02 | End: 2017-05-05 | Stop reason: SDUPTHER

## 2017-05-02 RX ORDER — OXYTOCIN/RINGER'S LACTATE 20/1000 ML
2 PLASTIC BAG, INJECTION (ML) INTRAVENOUS CONTINUOUS
Status: DISCONTINUED | OUTPATIENT
Start: 2017-05-02 | End: 2017-05-02

## 2017-05-02 RX ORDER — BUTORPHANOL TARTRATE 1 MG/ML
1 INJECTION INTRAMUSCULAR; INTRAVENOUS ONCE
Status: COMPLETED | OUTPATIENT
Start: 2017-05-02 | End: 2017-05-02

## 2017-05-02 RX ORDER — METHYLDOPA 250 MG/1
500 TABLET, FILM COATED ORAL EVERY 6 HOURS
Status: DISCONTINUED | OUTPATIENT
Start: 2017-05-02 | End: 2017-05-04

## 2017-05-02 RX ADMIN — HYDROXYZINE PAMOATE 50 MG: 50 CAPSULE ORAL at 05:05

## 2017-05-02 RX ADMIN — OXYCODONE HYDROCHLORIDE AND ACETAMINOPHEN 1 TABLET: 10; 325 TABLET ORAL at 06:05

## 2017-05-02 RX ADMIN — SODIUM CHLORIDE, SODIUM LACTATE, POTASSIUM CHLORIDE, AND CALCIUM CHLORIDE 1000 ML: .6; .31; .03; .02 INJECTION, SOLUTION INTRAVENOUS at 10:05

## 2017-05-02 RX ADMIN — METHYLDOPA 500 MG: 250 TABLET ORAL at 05:05

## 2017-05-02 RX ADMIN — METHYLDOPA 500 MG: 250 TABLET ORAL at 11:05

## 2017-05-02 RX ADMIN — ROPIVACAINE HYDROCHLORIDE 14 ML/HR: 2 INJECTION, SOLUTION EPIDURAL; INFILTRATION at 11:05

## 2017-05-02 RX ADMIN — BUTORPHANOL TARTRATE 1 MG: 1 INJECTION, SOLUTION INTRAMUSCULAR; INTRAVENOUS at 05:05

## 2017-05-02 RX ADMIN — HYDROXYZINE PAMOATE 25 MG: 25 CAPSULE ORAL at 05:05

## 2017-05-02 RX ADMIN — Medication 25 MCG: at 11:05

## 2017-05-02 RX ADMIN — Medication 25 MCG: at 07:05

## 2017-05-02 RX ADMIN — SODIUM CHLORIDE, SODIUM LACTATE, POTASSIUM CHLORIDE, AND CALCIUM CHLORIDE: .6; .31; .03; .02 INJECTION, SOLUTION INTRAVENOUS at 12:05

## 2017-05-02 RX ADMIN — LIDOCAINE HYDROCHLORIDE AND EPINEPHRINE 3 ML: 15; 5 INJECTION, SOLUTION EPIDURAL; INFILTRATION; INTRACAUDAL; PERINEURAL at 11:05

## 2017-05-02 RX ADMIN — HYDROXYZINE PAMOATE 25 MG: 25 CAPSULE ORAL at 11:05

## 2017-05-02 RX ADMIN — ROPIVACAINE HYDROCHLORIDE 7 ML: 2 INJECTION, SOLUTION EPIDURAL; INFILTRATION at 11:05

## 2017-05-02 RX ADMIN — Medication 2 MILLI-UNITS/MIN: at 07:05

## 2017-05-02 RX ADMIN — SODIUM CHLORIDE, SODIUM LACTATE, POTASSIUM CHLORIDE, AND CALCIUM CHLORIDE 500 ML: .6; .31; .03; .02 INJECTION, SOLUTION INTRAVENOUS at 10:05

## 2017-05-02 RX ADMIN — SODIUM CHLORIDE, SODIUM LACTATE, POTASSIUM CHLORIDE, AND CALCIUM CHLORIDE: .6; .31; .03; .02 INJECTION, SOLUTION INTRAVENOUS at 07:05

## 2017-05-02 NOTE — H&P
[facility] -   Obstetrics  History & Physical    Patient Name: Nathalie Roland  MRN: [id number]  Admission Date: 2017  Primary Care Provider: Primary Doctor No    Subjective:     Principal Problem:Supervision of high risk pregnancy in third trimester    History of Present Illness:  17 26 yr old  with IUP 37weeks even with polycystic kidney Dx and CHTN presents for cytotec IOL- Very anxious    Obstetric HPI:  Patient reports None contractions, active fetal movement, No vaginal bleeding , No loss of fluid     This pregnancy has been complicated by polycystic kidney DX, CHTN anxiety    Obstetric History       T0      TAB0   SAB0   E0   M0   L0       # Outcome Date GA Lbr Kulwant/2nd Weight Sex Delivery Anes PTL Lv   1 Current                 Past Medical History:   Diagnosis Date    Anemia     Hypertension     renal hypertension    Polycystic kidney disease      Past Surgical History:   Procedure Laterality Date    eye cyst      HERNIA REPAIR      UMBILICAL HERNIA REPAIR         PTA Medications   Medication Sig    butalbital-acetaminophen-caffeine -40 mg (FIORICET) -40 mg per tablet Take 1-2 tablets by mouth every 6 (six) hours as needed for Headaches.    methyldopa (ALDOMET) 250 MG tablet Take 2 tablets (500 mg total) by mouth every 6 (six) hours.    promethazine (PHENERGAN) 25 MG tablet Take 1 tablet (25 mg total) by mouth every 6 (six) hours as needed for Nausea.       Review of patient's allergies indicates:  No Known Allergies     Family History     Problem Relation (Age of Onset)    Cancer Mother, Paternal Grandmother    Heart attack Paternal Grandfather    Heart failure Paternal Grandmother    Hypertension Mother, Maternal Uncle, Maternal Grandfather, Paternal Grandmother, Paternal Grandfather    No Known Problems Father    Polycystic kidney disease Mother, Maternal Uncle, Maternal Grandfather        Social History Main Topics    Smoking status:  Never Smoker    Smokeless tobacco: Never Used    Alcohol use 0.0 oz/week     0 Standard drinks or equivalent per week      Comment: socially,past     Drug use: No    Sexual activity: Yes     Partners: Male     Birth control/ protection: None     Review of Systems   Constitutional: Negative.    HENT:        Denies headaches or visual disturbances   Respiratory: Negative for shortness of breath.    Cardiovascular: Negative for leg swelling.   Gastrointestinal: Negative for abdominal pain.   Genitourinary: Negative for dysuria, flank pain, frequency, vaginal bleeding, vaginal discharge and vaginal odor.   Musculoskeletal: Negative for back pain.   Skin:  Negative for rash.   Neurological: Negative for syncope, numbness and headaches.   Psychiatric/Behavioral: Negative for depression.      Objective:     Vital Signs (Most Recent):  Temp: 98.3 °F (36.8 °C) (05/02/17 0207)  Pulse: (!) 130 (05/02/17 0302)  BP: (!) 168/113 (05/02/17 0302) Vital Signs (24h Range):  Temp:  [98.3 °F (36.8 °C)] 98.3 °F (36.8 °C)  Pulse:  [130-163] 130  BP: (166-168)/(113-120) 168/113     Weight: 88.9 kg (196 lb)  Body mass index is 30.7 kg/(m^2).    FHT: 160 bpm Cat 1 (reassuring) baseline tachycardia  TOCO:  Q Occasional minutes    Physical Exam:   Constitutional: She is oriented to person, place, and time. She appears well-developed and well-nourished.    HENT:   Head: Normocephalic.     Neck: Normal range of motion. Neck supple. No thyromegaly present.    Cardiovascular: Normal rate, regular rhythm and normal heart sounds.  Exam reveals no edema.     Pulmonary/Chest: Effort normal and breath sounds normal. No respiratory distress.        Abdominal: Soft. Normal appearance and bowel sounds are normal. There is no tenderness. There is no rebound and no guarding. No hernia.     Genitourinary: Vagina normal. No vaginal discharge found.           Musculoskeletal: Normal range of motion. She exhibits no edema or tenderness.       Neurological:  She is alert and oriented to person, place, and time. She has normal reflexes.    Skin: Skin is warm and dry. No rash noted.    Psychiatric: She has a normal mood and affect.       Cervix:  Deferred       Significant Labs:  Lab Results   Component Value Date    GROUPTRH O POS 2017    HEPBSAG Negative 10/20/2016    STREPBCULT No Group B Streptococcus isolated 2017       CBC:   Recent Labs  Lab 17  0225   WBC 10.57   RBC 3.90*   HGB 12.2   HCT 35.6*      MCV 91   MCH 31.3*   MCHC 34.3     I have personallly reviewed all pertinent lab results from the last 24 hours.    Assessment/Plan:     26 y.o. female  at 37w0d for:    * Supervision of high risk pregnancy in third trimester  Cytotec IOL at 37 weeks secondary to polycystic DX and CHTN  Significant maternal anxiety  Cat 1 strip  Serial BP    Anxiety  Provide support      Mel Rodriguez CNM  Obstetrics  [facility] [place]

## 2017-05-02 NOTE — PROGRESS NOTES
"Discussed feeding choice with mother.  Reviewed benefits of breastfeeding.  Patient given "What to Expect in the First 48 Hours" handout. Mother states her intention is to breastfeed.    "

## 2017-05-02 NOTE — PROGRESS NOTES
0320-VALERIE Rodriguez cnm at bedside listening to patient,educating,and encouraging patient.       Discontinue continuous fetal monitoring until 0500 per VALERIE Rodriguez CNM.

## 2017-05-02 NOTE — PROGRESS NOTES
[facility]  Obstetrics  Labor Progress Note    Patient Name: Nathalie Roland  MRN: [id number]  Admission Date: 2017  Hospital Length of Stay: 0 days  Attending Physician: Noemi Valdez MD  Primary Care Provider: Primary Doctor No    Subjective:     Principal Problem:Supervision of high risk pregnancy in third trimester    Hospital Course:  17 at 0400hrs  POC discussed with parents, mom very anxious  NST, Serial BP Relax with  and try a warm bath  Start Cytotec IOL per protocol  17 @ 0730  Discussed with myself and Dr. Howard and will continue with cytotec IOL      Interval History:  Nathalie is a 26 y.o.  at 37w0d. She is doing well; anxiety stable; family at bedside     Objective:     Vital Signs (Most Recent):  Temp: 98.7 °F (37.1 °C) (17 1402)  Pulse: 98 (17 1602)  Resp: 18 (17 1402)  BP: (!) 161/112 (17 1602) Vital Signs (24h Range):  Temp:  [98.3 °F (36.8 °C)-98.8 °F (37.1 °C)] 98.7 °F (37.1 °C)  Pulse:  [] 98  Resp:  [18-22] 18  BP: (139-168)/() 161/112     Weight: 88.9 kg (196 lb)  Body mass index is 30.7 kg/(m^2).    FHT: 165 bpm Cat 2 (reassuring)  TOCO:  Q 1-2 minutes    Cervical Exam:  Dilation:  1  Effacement:  50%  Station: -2  Presentation: Vertex     Significant Labs:  Lab Results   Component Value Date    GROUPTRH O POS 2017    HEPBSAG Negative 10/20/2016    STREPBCULT No Group B Streptococcus isolated 2017       I have personallly reviewed all pertinent lab results from the last 24 hours.    Physical Exam:   Constitutional: She is oriented to person, place, and time.                           Neurological: She is alert and oriented to person, place, and time.         Assessment/Plan:     26 y.o. female  at 37w0d for:    * Supervision of high risk pregnancy in third trimester  Continue IOL - Cook perea bulb placed   Pitocin 1 hour after patient eats   Stadol PRN       Hypertension,  renal  Serial BP's every hour   Taking Aldomet 500 mg PO BID     Anxiety  Provide support  Vistaril 25 mg PO q 6hr        Mckenzie Metcalf CNM  Obstetrics  [facility]

## 2017-05-02 NOTE — ASSESSMENT & PLAN NOTE
Cytotec IOL at 37 weeks secondary to polycystic DX and CHTN  Significant maternal anxiety  Cat 1 strip  Serial BP

## 2017-05-02 NOTE — NURSING
Mckenzie Metcalf CNM, informed patient that she is able to eat at this time. Reports to start oxytocin 1 hour after patient has finished eating.

## 2017-05-02 NOTE — PROGRESS NOTES
Spoke with Dr. Valdez regarding patient's continued anxiety and elevated blood pressure/pulse. Ordered Aldomet 500MG x1 PO dose now.

## 2017-05-02 NOTE — SUBJECTIVE & OBJECTIVE
Interval History:  Nathalie is a 26 y.o.  at 37w0d. She is doing well; anxiety stable; family at bedside     Objective:     Vital Signs (Most Recent):  Temp: 98.7 °F (37.1 °C) (17 1402)  Pulse: 98 (17 1602)  Resp: 18 (17 1402)  BP: (!) 161/112 (17 1602) Vital Signs (24h Range):  Temp:  [98.3 °F (36.8 °C)-98.8 °F (37.1 °C)] 98.7 °F (37.1 °C)  Pulse:  [] 98  Resp:  [18-22] 18  BP: (139-168)/() 161/112     Weight: 88.9 kg (196 lb)  Body mass index is 30.7 kg/(m^2).    FHT: 165 bpm Cat 2 (reassuring)  TOCO:  Q 1-2 minutes    Cervical Exam:  Dilation:  1  Effacement:  50%  Station: -2  Presentation: Vertex     Significant Labs:  Lab Results   Component Value Date    GROUPTRH O POS 2017    HEPBSAG Negative 10/20/2016    STREPBCULT No Group B Streptococcus isolated 2017       I have personallly reviewed all pertinent lab results from the last 24 hours.    Physical Exam:   Constitutional: She is oriented to person, place, and time.                           Neurological: She is alert and oriented to person, place, and time.

## 2017-05-02 NOTE — SUBJECTIVE & OBJECTIVE
Interval History:  Nathalie is a 26 y.o.  at 37w0d. She is doing well. Anxiety better now after vistaril    Objective:     Vital Signs (Most Recent):  Temp: 98.8 °F (37.1 °C) (17 0656)  Pulse: (!) 113 (17 0702)  Resp: (!) 22 (1756)  BP: (!) 145/107 (17 07) Vital Signs (24h Range):  Temp:  [98.3 °F (36.8 °C)-98.8 °F (37.1 °C)] 98.8 °F (37.1 °C)  Pulse:  [113-163] 113  Resp:  [22] 22  BP: (145-168)/(100-120) 145/107     Weight: 88.9 kg (196 lb)  Body mass index is 30.7 kg/(m^2).    FHT: 140 Cat 1 (reassuring)  TOCO: Q 2-5 minutes mild    Cervical Exam:  Dilation:  1  Effacement:  50%  Station: -3  Presentation: Vertex     Significant Labs:  Lab Results   Component Value Date    GROUPTRH O POS 2017    HEPBSAG Negative 10/20/2016    STREPBCULT No Group B Streptococcus isolated 2017       I have personallly reviewed all pertinent lab results from the last 24 hours.    Physical Exam:   Constitutional: She is oriented to person, place, and time. She appears well-developed and well-nourished.    HENT:   Head: Normocephalic.      Cardiovascular: Regular rhythm.  Tachycardia present.     Pulmonary/Chest: Effort normal and breath sounds normal.        Abdominal: Soft. Bowel sounds are normal.     Genitourinary: Vagina normal.           Musculoskeletal: Normal range of motion and moves all extremeties.       Neurological: She is alert and oriented to person, place, and time. She has normal reflexes.    Skin: Skin is warm and dry.    Psychiatric: Her behavior is normal. Judgment and thought content normal.

## 2017-05-02 NOTE — PROGRESS NOTES
Cervical ripening balloon inserted per hai Metcalf cnm sve done 1.2/50/-2, filled to 80/80, pt. Tolerated well

## 2017-05-02 NOTE — PROGRESS NOTES
Updated VALERIE Rodriguez cnm of maternal blood pressure,elevated heart rate and patients anxiety. Patient states shes very anxious. Taking slow deep breaths to try to calm down.

## 2017-05-02 NOTE — SUBJECTIVE & OBJECTIVE
Obstetric HPI:  Patient reports None contractions, active fetal movement, No vaginal bleeding , No loss of fluid     This pregnancy has been complicated by polycystic kidney DX, CHTN anxiety    Obstetric History       T0      TAB0   SAB0   E0   M0   L0       # Outcome Date GA Lbr Kulwant/2nd Weight Sex Delivery Anes PTL Lv   1 Current                 Past Medical History:   Diagnosis Date    Anemia     Hypertension     renal hypertension    Polycystic kidney disease      Past Surgical History:   Procedure Laterality Date    eye cyst      HERNIA REPAIR      UMBILICAL HERNIA REPAIR         PTA Medications   Medication Sig    butalbital-acetaminophen-caffeine -40 mg (FIORICET) -40 mg per tablet Take 1-2 tablets by mouth every 6 (six) hours as needed for Headaches.    methyldopa (ALDOMET) 250 MG tablet Take 2 tablets (500 mg total) by mouth every 6 (six) hours.    promethazine (PHENERGAN) 25 MG tablet Take 1 tablet (25 mg total) by mouth every 6 (six) hours as needed for Nausea.       Review of patient's allergies indicates:  No Known Allergies     Family History     Problem Relation (Age of Onset)    Cancer Mother, Paternal Grandmother    Heart attack Paternal Grandfather    Heart failure Paternal Grandmother    Hypertension Mother, Maternal Uncle, Maternal Grandfather, Paternal Grandmother, Paternal Grandfather    No Known Problems Father    Polycystic kidney disease Mother, Maternal Uncle, Maternal Grandfather        Social History Main Topics    Smoking status: Never Smoker    Smokeless tobacco: Never Used    Alcohol use 0.0 oz/week     0 Standard drinks or equivalent per week      Comment: socially,past     Drug use: No    Sexual activity: Yes     Partners: Male     Birth control/ protection: None     Review of Systems   Constitutional: Negative.    HENT:        Denies headaches or visual disturbances   Respiratory: Negative for shortness of breath.    Cardiovascular: Negative  for leg swelling.   Gastrointestinal: Negative for abdominal pain.   Genitourinary: Negative for dysuria, flank pain, frequency, vaginal bleeding, vaginal discharge and vaginal odor.   Musculoskeletal: Negative for back pain.   Skin:  Negative for rash.   Neurological: Negative for syncope, numbness and headaches.   Psychiatric/Behavioral: Negative for depression.      Objective:     Vital Signs (Most Recent):  Temp: 98.3 °F (36.8 °C) (05/02/17 0207)  Pulse: (!) 130 (05/02/17 0302)  BP: (!) 168/113 (05/02/17 0302) Vital Signs (24h Range):  Temp:  [98.3 °F (36.8 °C)] 98.3 °F (36.8 °C)  Pulse:  [130-163] 130  BP: (166-168)/(113-120) 168/113     Weight: 88.9 kg (196 lb)  Body mass index is 30.7 kg/(m^2).    FHT: 160 bpm Cat 1 (reassuring) baseline tachycardia  TOCO:  Q Occasional minutes    Physical Exam:   Constitutional: She is oriented to person, place, and time. She appears well-developed and well-nourished.    HENT:   Head: Normocephalic.     Neck: Normal range of motion. Neck supple. No thyromegaly present.    Cardiovascular: Normal rate, regular rhythm and normal heart sounds.  Exam reveals no edema.     Pulmonary/Chest: Effort normal and breath sounds normal. No respiratory distress.        Abdominal: Soft. Normal appearance and bowel sounds are normal. There is no tenderness. There is no rebound and no guarding. No hernia.     Genitourinary: Vagina normal. No vaginal discharge found.           Musculoskeletal: Normal range of motion. She exhibits no edema or tenderness.       Neurological: She is alert and oriented to person, place, and time. She has normal reflexes.    Skin: Skin is warm and dry. No rash noted.    Psychiatric: She has a normal mood and affect.       Cervix:  Deferred       Significant Labs:  Lab Results   Component Value Date    GROUPTRH O POS 05/02/2017    HEPBSAG Negative 10/20/2016    STREPBCULT No Group B Streptococcus isolated 04/19/2017       CBC:   Recent Labs  Lab 05/02/17  0225   WBC  10.57   RBC 3.90*   HGB 12.2   HCT 35.6*      MCV 91   MCH 31.3*   MCHC 34.3     I have personallly reviewed all pertinent lab results from the last 24 hours.

## 2017-05-02 NOTE — IP AVS SNAPSHOT
[facility]  [address] [place] [address] [address] [place] [address]           Patient Discharge Instructions   Our goal is to set you up for success. This packet includes information on your condition, medications, and your home care.  It will help you care for yourself to prevent having to return to the hospital.     Please ask your nurse if you have any questions.      There are many details to remember when preparing to leave the hospital. Here is what you will need to do:    1. Take your medicine. If you are prescribed medications, review your Medication List on the following pages. You may have new medications to  at the pharmacy and others that you'll need to stop taking. Review the instructions for how and when to take your medications. Talk with your doctor or nurses if you are unsure of what to do.     2. Go to your follow-up appointments. Specific follow-up information is listed in the following pages. Your may be contacted by a nurse or clinical provider about future appointments. Be sure we have all of the phone numbers to reach you. Please contact your provider's office if you are unable to make an appointment.     3. Watch for warning signs. Your doctor or nurse will give you detailed warning signs to watch for and when to call for assistance. These instructions may also include educational information about your condition. If you experience any of warning signs to your health, call your doctor.           [facility]  Unless otherwise directed by your provider, please   contact [facility], our nurse care line   that is available for 24/7 assistance.     [phone] (toll-free)     Registered nurses in the [facility]   provide: appointment scheduling, clinical advisement, health education, and other advisory services.                  ** Verify the list of medication(s) below is accurate and up to date. Carry this with you in case of emergency. If your  medications have changed, please notify your healthcare provider.             Medication List      START taking these medications        Additional Info                      ferrous sulfate 325 mg (65 mg iron) Tab tablet   Quantity:  30 tablet   Refills:  3   Dose:  325 mg    Instructions:  Take 1 tablet (325 mg total) by mouth once daily.     Begin Date    AM    Noon    PM    Bedtime       hydrOXYzine pamoate 25 MG Cap   Commonly known as:  VISTARIL   Quantity:  30 capsule   Refills:  1   Dose:  25 mg    Last time this was given:  25 mg on 5/5/2017 12:55 PM   Instructions:  Take 1 capsule (25 mg total) by mouth every 6 (six) hours as needed (anxiety).     Begin Date    AM    Noon    PM    Bedtime       nifedipine 60 MG (OSM) 24 hr tablet   Commonly known as:  PROCARDIA-XL   Quantity:  60 tablet   Refills:  11   Dose:  60 mg    Last time this was given:  60 mg on 5/5/2017  8:08 AM   Instructions:  Take 1 tablet (60 mg total) by mouth 2 (two) times daily.     Begin Date    AM    Noon    PM    Bedtime       oxycodone-acetaminophen 5-325 mg per tablet   Commonly known as:  PERCOCET   Quantity:  30 tablet   Refills:  0   Dose:  1 tablet    Last time this was given:  1 tablet on 5/5/2017  1:45 AM   Instructions:  Take 1 tablet by mouth every 4 (four) hours as needed for Pain.     Begin Date    AM    Noon    PM    Bedtime         CONTINUE taking these medications        Additional Info                      butalbital-acetaminophen-caffeine -40 mg -40 mg per tablet   Commonly known as:  FIORICET   Quantity:  30 tablet   Refills:  1   Dose:  1-2 tablet    Instructions:  Take 1-2 tablets by mouth every 6 (six) hours as needed for Headaches.     Begin Date    AM    Noon    PM    Bedtime       promethazine 25 MG tablet   Commonly known as:  PHENERGAN   Quantity:  20 tablet   Refills:  0   Dose:  25 mg    Instructions:  Take 1 tablet (25 mg total) by mouth every 6 (six) hours as needed for Nausea.     Begin Date     AM    Noon    PM    Bedtime         STOP taking these medications     methyldopa 250 MG tablet   Commonly known as:  ALDOMET            Where to Get Your Medications      These medications were sent to [id number] - [place], [place] - [address]  [address] [facility] [place] [address]     Phone:  [phone]     ferrous sulfate 325 mg (65 mg iron) Tab tablet    hydrOXYzine pamoate 25 MG Cap    nifedipine 60 MG (OSM) 24 hr tablet    oxycodone-acetaminophen 5-325 mg per tablet                  Please bring to all follow up appointments:    1. A copy of your discharge instructions.  2. All medicines you are currently taking in their original bottles.  3. Identification and insurance card.    Please arrive 15 minutes ahead of scheduled appointment time.    Please call 24 hours in advance if you must reschedule your appointment and/or time.        Your Scheduled Appointments     May 18, 2017  9:55 AM CDT   Non-Fasting Lab with LABORATORY, [facility]    [address] [place] [id number] [phone]            May 18, 2017 10:00 AM CDT   Urine with SPECIMEN, [facility]    [facility] [id number] [phone]            May 23, 2017  3:00 PM CDT   Established Patient Visit with Mee Rabago MD   [facility] - [facility] ([facility])    [facility] [address] [phone]            May 31, 2017 10:30 AM CDT   Post Partum with Amalia Metcalf MD   [facility] ([facility])    [address] [facility] [address] [phone]              Follow-up Information     Follow up with OB GYN NURSE, ON On 5/10/2017.    Why:  dressing removal and bp check        Follow up with Amalia Metcalf MD In 4 weeks.    Specialty:  Obstetrics and Gynecology    Why:  post-op check    Contact information:    [address] [facility]  [facility] [address]  [phone]          Discharge Instructions     Future Orders    Activity as tolerated     Call MD for:  difficulty breathing or increased cough     Call MD for:  increased confusion or weakness     Call MD for:  persistent dizziness, light-headedness, or visual disturbances     Call MD for:  persistent nausea and vomiting or diarrhea     Call MD for:  redness, tenderness, or signs of infection (pain, swelling, redness, odor or green/yellow discharge around incision site)     Call MD for:  severe persistent headache     Call MD for:  severe uncontrolled pain     Call MD for:  temperature >100.4     Call MD for:  worsening rash     Diet general     Questions:    Total calories:      Fat restriction, if any:      Protein restriction, if any:      Na restriction, if any:      Fluid restriction:      Additional restrictions:      Leave dressing on - Keep it clean, dry, and intact until clinic visit     Lifting restrictions     Comments:    No lifting >20 pounds        Discharge Instructions       Mother Self Care:    Activity: Avoid strenuous exercise and get adequate rest.  No driving until your physician gives you consent.  Emotional Changes: The grieving process has many different stages, be prepared to experience lots of emotional ups and downs. Identify people to be your support system, and do not hesitate to call our  if you need someone to talk to.   Breast Care: You may notice milk leaking from your breasts. Wear a support bra 24 hours a day for one week or wrap breasts in an ace bandage if needed to stop milk production.  Avoid stimulation to breasts.  You may use ice packs for discomfort.  Cookie-Care/Vaginal Bleeding: Remember to use your cookie-bottle after urinating.  Your flow will change from red, to pink, to yellow/white color over a period of 2 weeks.  Menstruation will return in 3-8 weeks.  Episiotomy Vaginal Delivery: Stitches will dissolve within 10 days to 3 weeks.   Warm baths, tucks, and dermoplast spray will promote healing.  Avoid bubble baths or strong soaps.   Section/Tubal Ligation: Keep incision clean and dry.  Please remove steri-strips in 5-7 days.  You may shower, but avoid baths.  Sexual Activity/Pelvic Rest: No sexual activity, tampons, or douching until your physician gives you consent.  Diet: Continue to eat from the five basic food groups, including plenty of protein, fruits, vegetables, and whole grains.  Limit empty calories and high fat foods.  Drink enough fluids to satisfy thirst.  Constipation/Hemorrhoids: Drink plenty of water.  You may take a stool softener or natural laxative (Metamucil). You may use tucks or hemorrhoid ointment and soak in a warm tub.    CALL YOUR OB DOCTOR IF ANY OF THE FOLLOWING OCCURS:  *Heavy bleeding - saturating a pad an hour or passing any large (2-3 inches in size) blood clots.  *Any pain, redness, or tenderness in lower leg.  *You cannot care for yourself  *Any signs of infection-      - Temperature greater than 100.5 degrees F      - Foul smelling vaginal discharge and/or incisional drainage      - Increased episiotomy or incisional pain      - Hot, hard, red or sore area on breast      - Flu-like symptoms      - Any urgency, frequency or burning with urination    Discharge References/Attachments      SECTION (), DISCHARGE INSTRUCTIONS FOR (ENGLISH)    BREASTFEEDING, COMMON QUESTIONS ABOUT (ENGLISH)    BREASTFEEDING: CARING FOR YOURSELF (ENGLISH)    EXPRESSED MILK, STORING (ENGLISH)        Primary Diagnosis     Your primary diagnosis was:  High-Risk Pregnancy      Admission Information     Date & Time Provider Department CSN    2017  1:49 AM Noemi Valdez MD [facility] [id number]      Care Providers     Provider Role Specialty Primary office phone    Noemi Valdez MD Attending Provider Gynecology [phone]    Amalia Metcalf MD Surgeon  Obstetrics and Gynecology  "[phone]      Your Vitals Were     BP Pulse Temp Resp Height Weight    144/89 116 98.6 °F (37 °C) (Oral) 18 5' 7" (1.702 m) 88.9 kg (196 lb)    Last Period SpO2 BMI          08/16/2016 (Exact Date) 98% 30.7 kg/m2        Recent Lab Values     No lab values to display.      Allergies as of 5/5/2017     No Known Allergies      Advance Directives     An advance directive is a document which, in the event you are no longer able to make decisions for yourself, tells your healthcare team what kind of treatment you do or do not want to receive, or who you would like to make those decisions for you.  If you do not currently have an advance directive, [facility] encourages you to create one.  For more information call:  [phone]), [phone] ([phone]),  or log on to [email or web address].        Language Assistance Services     ATTENTION: Language assistance services are available, free of charge. Please call [phone].      ATENCIÓN: Si habla español, tiene a bernal disposición servicios gratuitos de asistencia lingüística. Llame al [phone].     CHÚ Ý: N?u b?n nói Ti?ng Vi?t, có các d?ch v? h? tr? ngôn ng? mi?n phí dành cho b?n. G?i s? [phone].         [facility]  complies with applicable Federal civil rights laws and does not discriminate on the basis of race, color, national origin, age, disability, or sex.        "

## 2017-05-02 NOTE — PROGRESS NOTES
[facility]  Obstetrics  Labor Progress Note    Patient Name: Nathalie Roland  MRN: [id number]  Admission Date: 2017  Hospital Length of Stay: 0 days  Attending Physician: Noemi Valdez MD  Primary Care Provider: Primary Doctor No    Subjective:     Principal Problem:Supervision of high risk pregnancy in third trimester    Hospital Course:  17 at 0400hrs  POC discussed with parents, mom very anxious  NST, Serial BP Relax with  and try a warm bath  Start Cytotec IOL per protocol  17 @ 0730  Discussed with myself and Dr. Howard and will continue with cytotec IOL      Interval History:  Nathalie is a 26 y.o.  at 37w0d. She is doing well. Anxiety better now after vistaril    Objective:     Vital Signs (Most Recent):  Temp: 98.8 °F (37.1 °C) (17 0656)  Pulse: (!) 113 (17 0702)  Resp: (!) 22 (17 0656)  BP: (!) 145/107 (17 0702) Vital Signs (24h Range):  Temp:  [98.3 °F (36.8 °C)-98.8 °F (37.1 °C)] 98.8 °F (37.1 °C)  Pulse:  [113-163] 113  Resp:  [22] 22  BP: (145-168)/(100-120) 145/107     Weight: 88.9 kg (196 lb)  Body mass index is 30.7 kg/(m^2).    FHT: 140 Cat 1 (reassuring)  TOCO: Q 2-5 minutes mild    Cervical Exam:  Dilation:  1  Effacement:  50%  Station: -3  Presentation: Vertex     Significant Labs:  Lab Results   Component Value Date    GROUPTRH O POS 2017    HEPBSAG Negative 10/20/2016    STREPBCULT No Group B Streptococcus isolated 2017       I have personallly reviewed all pertinent lab results from the last 24 hours.    Physical Exam:   Constitutional: She is oriented to person, place, and time. She appears well-developed and well-nourished.    HENT:   Head: Normocephalic.      Cardiovascular: Regular rhythm.  Tachycardia present.     Pulmonary/Chest: Effort normal and breath sounds normal.        Abdominal: Soft. Bowel sounds are normal.     Genitourinary: Vagina normal.           Musculoskeletal: Normal range of  motion and moves all extremeties.       Neurological: She is alert and oriented to person, place, and time. She has normal reflexes.    Skin: Skin is warm and dry.    Psychiatric: Her behavior is normal. Judgment and thought content normal.       Assessment/Plan:     26 y.o. female  at 37w0d for:    * Supervision of high risk pregnancy in third trimester  Cytotec IOL at 37 weeks secondary to polycystic DX and CHTN  Significant maternal anxiety  Cat 1 strip  Serial BP        Mckenzie Metcalf CNM  Obstetrics  [facility]

## 2017-05-02 NOTE — PLAN OF CARE
Problem: Patient Care Overview  Goal: Plan of Care Review  Outcome: Ongoing (interventions implemented as appropriate)  Pt received cytotec during induction process, and continued to perea bulb for manual dilation. Patient given vistaril and aldomet throughout the day.  at bedside for support.

## 2017-05-03 ENCOUNTER — SURGERY (OUTPATIENT)
Age: 27
End: 2017-05-03

## 2017-05-03 VITALS — OXYGEN SATURATION: 98 % | HEART RATE: 92 BPM | SYSTOLIC BLOOD PRESSURE: 147 MMHG | DIASTOLIC BLOOD PRESSURE: 93 MMHG

## 2017-05-03 PROBLEM — Z98.891 STATUS POST CESAREAN SECTION: Status: ACTIVE | Noted: 2017-05-03

## 2017-05-03 PROCEDURE — 25000003 PHARM REV CODE 250: Performed by: ANESTHESIOLOGY

## 2017-05-03 PROCEDURE — 94799 UNLISTED PULMONARY SVC/PX: CPT

## 2017-05-03 PROCEDURE — 27000181 HC CABLE, IUPC

## 2017-05-03 PROCEDURE — 11000001 HC ACUTE MED/SURG PRIVATE ROOM

## 2017-05-03 PROCEDURE — 51701 INSERT BLADDER CATHETER: CPT

## 2017-05-03 PROCEDURE — 72100003 HC LABOR CARE, EA. ADDL. 8 HRS

## 2017-05-03 PROCEDURE — 25000003 PHARM REV CODE 250: Performed by: ADVANCED PRACTICE MIDWIFE

## 2017-05-03 PROCEDURE — 36000685 HC CESAREAN SECTION LEVEL I

## 2017-05-03 PROCEDURE — 63600175 PHARM REV CODE 636 W HCPCS: Performed by: NURSE ANESTHETIST, CERTIFIED REGISTERED

## 2017-05-03 PROCEDURE — 25000003 PHARM REV CODE 250: Performed by: NURSE ANESTHETIST, CERTIFIED REGISTERED

## 2017-05-03 PROCEDURE — 25000003 PHARM REV CODE 250: Performed by: OBSTETRICS & GYNECOLOGY

## 2017-05-03 PROCEDURE — 37000008 HC ANESTHESIA 1ST 15 MINUTES: Performed by: OBSTETRICS & GYNECOLOGY

## 2017-05-03 PROCEDURE — 59510 CESAREAN DELIVERY: CPT | Mod: AT,,, | Performed by: OBSTETRICS & GYNECOLOGY

## 2017-05-03 PROCEDURE — 51702 INSERT TEMP BLADDER CATH: CPT

## 2017-05-03 PROCEDURE — C1765 ADHESION BARRIER: HCPCS

## 2017-05-03 PROCEDURE — 63600175 PHARM REV CODE 636 W HCPCS: Performed by: OBSTETRICS & GYNECOLOGY

## 2017-05-03 PROCEDURE — 37000009 HC ANESTHESIA EA ADD 15 MINS: Performed by: OBSTETRICS & GYNECOLOGY

## 2017-05-03 RX ORDER — ONDANSETRON 8 MG/1
8 TABLET, ORALLY DISINTEGRATING ORAL EVERY 8 HOURS PRN
Status: DISCONTINUED | OUTPATIENT
Start: 2017-05-03 | End: 2017-05-05 | Stop reason: HOSPADM

## 2017-05-03 RX ORDER — LIDOCAINE HCL/EPINEPHRINE/PF 2%-1:200K
VIAL (ML) INJECTION
Status: DISCONTINUED | OUTPATIENT
Start: 2017-05-03 | End: 2017-05-03

## 2017-05-03 RX ORDER — LIDOCAINE HYDROCHLORIDE 20 MG/ML
INJECTION, SOLUTION EPIDURAL; INFILTRATION; INTRACAUDAL; PERINEURAL
Status: DISCONTINUED | OUTPATIENT
Start: 2017-05-03 | End: 2017-05-03

## 2017-05-03 RX ORDER — OXYTOCIN/RINGER'S LACTATE 20/1000 ML
41.65 PLASTIC BAG, INJECTION (ML) INTRAVENOUS CONTINUOUS
Status: ACTIVE | OUTPATIENT
Start: 2017-05-03 | End: 2017-05-03

## 2017-05-03 RX ORDER — BISACODYL 10 MG
10 SUPPOSITORY, RECTAL RECTAL ONCE AS NEEDED
Status: ACTIVE | OUTPATIENT
Start: 2017-05-03 | End: 2017-05-03

## 2017-05-03 RX ORDER — DOCUSATE SODIUM 100 MG/1
200 CAPSULE, LIQUID FILLED ORAL 2 TIMES DAILY
Status: DISCONTINUED | OUTPATIENT
Start: 2017-05-03 | End: 2017-05-05 | Stop reason: HOSPADM

## 2017-05-03 RX ORDER — IBUPROFEN 800 MG/1
800 TABLET ORAL EVERY 8 HOURS
Status: DISCONTINUED | OUTPATIENT
Start: 2017-05-04 | End: 2017-05-04

## 2017-05-03 RX ORDER — SODIUM CHLORIDE, SODIUM LACTATE, POTASSIUM CHLORIDE, CALCIUM CHLORIDE 600; 310; 30; 20 MG/100ML; MG/100ML; MG/100ML; MG/100ML
INJECTION, SOLUTION INTRAVENOUS CONTINUOUS
Status: DISCONTINUED | OUTPATIENT
Start: 2017-05-03 | End: 2017-05-04

## 2017-05-03 RX ORDER — HYDROCORTISONE 25 MG/G
CREAM TOPICAL 3 TIMES DAILY PRN
Status: DISCONTINUED | OUTPATIENT
Start: 2017-05-03 | End: 2017-05-05 | Stop reason: HOSPADM

## 2017-05-03 RX ORDER — OXYCODONE AND ACETAMINOPHEN 10; 325 MG/1; MG/1
1 TABLET ORAL EVERY 4 HOURS PRN
Status: DISCONTINUED | OUTPATIENT
Start: 2017-05-03 | End: 2017-05-05 | Stop reason: HOSPADM

## 2017-05-03 RX ORDER — ONDANSETRON 2 MG/ML
INJECTION INTRAMUSCULAR; INTRAVENOUS
Status: DISCONTINUED | OUTPATIENT
Start: 2017-05-03 | End: 2017-05-03

## 2017-05-03 RX ORDER — KETOROLAC TROMETHAMINE 30 MG/ML
INJECTION, SOLUTION INTRAMUSCULAR; INTRAVENOUS
Status: DISCONTINUED | OUTPATIENT
Start: 2017-05-03 | End: 2017-05-03

## 2017-05-03 RX ORDER — NIFEDIPINE 30 MG/1
30 TABLET, EXTENDED RELEASE ORAL ONCE
Status: COMPLETED | OUTPATIENT
Start: 2017-05-03 | End: 2017-05-03

## 2017-05-03 RX ORDER — ADHESIVE BANDAGE
30 BANDAGE TOPICAL 2 TIMES DAILY PRN
Status: DISCONTINUED | OUTPATIENT
Start: 2017-05-04 | End: 2017-05-05 | Stop reason: HOSPADM

## 2017-05-03 RX ORDER — DIPHENHYDRAMINE HCL 25 MG
25 CAPSULE ORAL EVERY 4 HOURS PRN
Status: DISCONTINUED | OUTPATIENT
Start: 2017-05-03 | End: 2017-05-05 | Stop reason: HOSPADM

## 2017-05-03 RX ORDER — KETOROLAC TROMETHAMINE 30 MG/ML
30 INJECTION, SOLUTION INTRAMUSCULAR; INTRAVENOUS EVERY 6 HOURS
Status: DISCONTINUED | OUTPATIENT
Start: 2017-05-03 | End: 2017-05-04

## 2017-05-03 RX ORDER — OXYCODONE AND ACETAMINOPHEN 5; 325 MG/1; MG/1
1 TABLET ORAL EVERY 4 HOURS PRN
Status: DISCONTINUED | OUTPATIENT
Start: 2017-05-03 | End: 2017-05-05 | Stop reason: HOSPADM

## 2017-05-03 RX ORDER — MORPHINE SULFATE 1 MG/ML
INJECTION, SOLUTION EPIDURAL; INTRATHECAL; INTRAVENOUS
Status: DISCONTINUED | OUTPATIENT
Start: 2017-05-03 | End: 2017-05-03

## 2017-05-03 RX ORDER — OXYTOCIN/RINGER'S LACTATE 20/1000 ML
PLASTIC BAG, INJECTION (ML) INTRAVENOUS CONTINUOUS PRN
Status: DISCONTINUED | OUTPATIENT
Start: 2017-05-03 | End: 2017-05-03

## 2017-05-03 RX ORDER — SIMETHICONE 80 MG
1 TABLET,CHEWABLE ORAL EVERY 6 HOURS PRN
Status: DISCONTINUED | OUTPATIENT
Start: 2017-05-03 | End: 2017-05-05 | Stop reason: HOSPADM

## 2017-05-03 RX ORDER — AMOXICILLIN 250 MG
1 CAPSULE ORAL NIGHTLY PRN
Status: DISCONTINUED | OUTPATIENT
Start: 2017-05-03 | End: 2017-05-05 | Stop reason: HOSPADM

## 2017-05-03 RX ADMIN — METHYLDOPA 500 MG: 250 TABLET ORAL at 12:05

## 2017-05-03 RX ADMIN — DOCUSATE SODIUM 200 MG: 100 CAPSULE, LIQUID FILLED ORAL at 08:05

## 2017-05-03 RX ADMIN — KETOROLAC TROMETHAMINE 30 MG: 30 INJECTION, SOLUTION INTRAMUSCULAR; INTRAVENOUS at 08:05

## 2017-05-03 RX ADMIN — CEFAZOLIN 2 G: 1 INJECTION, POWDER, FOR SOLUTION INTRAMUSCULAR; INTRAVENOUS at 07:05

## 2017-05-03 RX ADMIN — LIDOCAINE HYDROCHLORIDE,EPINEPHRINE BITARTRATE 5 ML: 20; .005 INJECTION, SOLUTION EPIDURAL; INFILTRATION; INTRACAUDAL; PERINEURAL at 07:05

## 2017-05-03 RX ADMIN — LIDOCAINE HYDROCHLORIDE 3 ML: 20 INJECTION, SOLUTION EPIDURAL; INFILTRATION; INTRACAUDAL; PERINEURAL at 08:05

## 2017-05-03 RX ADMIN — METHYLDOPA 500 MG: 250 TABLET ORAL at 05:05

## 2017-05-03 RX ADMIN — SODIUM CITRATE AND CITRIC ACID MONOHYDRATE 30 ML: 500; 334 SOLUTION ORAL at 07:05

## 2017-05-03 RX ADMIN — HYDROXYZINE PAMOATE 25 MG: 25 CAPSULE ORAL at 06:05

## 2017-05-03 RX ADMIN — LIDOCAINE HYDROCHLORIDE 5 ML: 20 INJECTION, SOLUTION EPIDURAL; INFILTRATION; INTRACAUDAL; PERINEURAL at 07:05

## 2017-05-03 RX ADMIN — SODIUM CHLORIDE, SODIUM LACTATE, POTASSIUM CHLORIDE, AND CALCIUM CHLORIDE 1000 ML: .6; .31; .03; .02 INJECTION, SOLUTION INTRAVENOUS at 05:05

## 2017-05-03 RX ADMIN — LIDOCAINE HYDROCHLORIDE 2 ML: 20 INJECTION, SOLUTION EPIDURAL; INFILTRATION; INTRACAUDAL; PERINEURAL at 08:05

## 2017-05-03 RX ADMIN — Medication: at 08:05

## 2017-05-03 RX ADMIN — KETOROLAC TROMETHAMINE 30 MG: 30 INJECTION, SOLUTION INTRAMUSCULAR at 08:05

## 2017-05-03 RX ADMIN — NIFEDIPINE 30 MG: 30 TABLET, FILM COATED, EXTENDED RELEASE ORAL at 05:05

## 2017-05-03 RX ADMIN — HYDROXYZINE PAMOATE 25 MG: 25 CAPSULE ORAL at 12:05

## 2017-05-03 RX ADMIN — HYDROXYZINE PAMOATE 25 MG: 25 CAPSULE ORAL at 01:05

## 2017-05-03 RX ADMIN — MORPHINE SULFATE 3 MG: 1 INJECTION, SOLUTION EPIDURAL; INTRATHECAL; INTRAVENOUS at 08:05

## 2017-05-03 RX ADMIN — ONDANSETRON 4 MG: 2 INJECTION, SOLUTION INTRAMUSCULAR; INTRAVENOUS at 08:05

## 2017-05-03 RX ADMIN — HYDROXYZINE PAMOATE 25 MG: 25 CAPSULE ORAL at 05:05

## 2017-05-03 RX ADMIN — SODIUM CHLORIDE, SODIUM LACTATE, POTASSIUM CHLORIDE, AND CALCIUM CHLORIDE 1000 ML: .6; .31; .03; .02 INJECTION, SOLUTION INTRAVENOUS at 07:05

## 2017-05-03 RX ADMIN — KETOROLAC TROMETHAMINE 30 MG: 30 INJECTION, SOLUTION INTRAMUSCULAR at 02:05

## 2017-05-03 RX ADMIN — METHYLDOPA 500 MG: 250 TABLET ORAL at 01:05

## 2017-05-03 NOTE — PLAN OF CARE
Problem: Labor (Cervical Ripen, Induct, Augment) (Adult,Obstetrics,Pediatric)  Goal: Signs and Symptoms of Listed Potential Problems Will be Absent, Minimized or Managed (Labor)  Signs and symptoms of listed potential problems will be absent, minimized or managed by discharge/transition of care (reference Labor (Cervical Ripen, Induct, Augment) (Adult,Obstetrics,Pediatric) CPG).   Outcome: Ongoing (interventions implemented as appropriate)  Pt requesting epidural and 6 cm dilated. Pt currently on pit with SROM at 21:40. Pt wanting to continue with plan of care. Pt is reporting 8 out of 10 pain.

## 2017-05-03 NOTE — PROGRESS NOTES
[facility]  Obstetrics  Labor Progress Note    Patient Name: Nathalie Roland  MRN: [id number]  Admission Date: 2017  Hospital Length of Stay: 0 days  Attending Physician: Noemi Valdez MD  Primary Care Provider: Primary Doctor No    Subjective:     Principal Problem:Supervision of high risk pregnancy in third trimester    Hospital Course:  17 at 0400hrs  POC discussed with parents, mom very anxious  NST, Serial BP Relax with  and try a warm bath  Start Cytotec IOL per protocol  17 @ 0730  Discussed with myself and Dr. Howard and will continue with cytotec IOL  Zamudio bulb placed @ 1700   SROM @ 2139  Zamudio fell out @ 2240      Interval History:  Nathalie is a 26 y.o.  at 37w0d. She is doing well. Reports more contractions and pain rating 7/10. Desires epidural.    Objective:     Vital Signs (Most Recent):  Temp: 98.3 °F (36.8 °C) (17)  Pulse: (!) 111 (17)  Resp: 18 (17 1905)  BP: (!) 137/92 (17) Vital Signs (24h Range):  Temp:  [97.4 °F (36.3 °C)-98.8 °F (37.1 °C)] 98.3 °F (36.8 °C)  Pulse:  [] 111  Resp:  [17-22] 18  BP: (133-172)/() 137/92     Weight: 88.9 kg (196 lb)  Body mass index is 30.7 kg/(m^2).    FHT: 160 Cat 2 (minimial variability, accels, no decels at present)  TOCO: Q 2-3 minutes    Cervical Exam:  Dilation:  6  Effacement:  60 %  Station: -2  Presentation: Vertex     Significant Labs:  Lab Results   Component Value Date    GROUPTRH O POS 2017    HEPBSAG Negative 10/20/2016    STREPBCULT No Group B Streptococcus isolated 2017       I have personallly reviewed all pertinent lab results from the last 24 hours.    Physical Exam:   Constitutional: She appears well-developed and well-nourished.                             Psychiatric: Her mood appears anxious.       Assessment/Plan:     26 y.o. female  at 37w0d for:    * Supervision of high risk pregnancy in third trimester  Robb  bulb out  Ok for epidural  Continue pitocin augmentation    Hypertension, renal  Serial BP's every hour   Epidural for pain control, will reassess BP after epidural        Mckenzie Metcalf CNM  Obstetrics  [facility]

## 2017-05-03 NOTE — ANESTHESIA PROCEDURE NOTES
Epidural    Patient location during procedure: OB   Reason for block: primary anesthetic   Diagnosis: IUP   Start time: 5/2/2017 11:19 PM  Timeout: 5/2/2017 11:18 PM  End time: 5/2/2017 11:26 PM  Surgery related to: IUP/Labor Pain  Staffing  Anesthesiologist: SYED ARREDONDO  Performed by: anesthesiologist   Preanesthetic Checklist  Completed: patient identified, surgical consent, pre-op evaluation, timeout performed, IV checked, risks and benefits discussed, monitors and equipment checked, anesthesia consent given, hand hygiene performed and patient being monitored  Preparation  Patient position: sitting  Prep: Betadine  Patient monitoring: Blood Pressure  Epidural  Skin Anesthetic: lidocaine 1%  Skin Wheal: 3 mL  Administration type: continuous  Approach: midline  Interspace: L4-5  Injection technique: IWONA saline  Needle and Epidural Catheter  Needle type: Tuohy   Needle gauge: 18  Needle length: 3.5 inches  Needle insertion depth: 4.5 cm  Catheter type: multi-orifice  Catheter size: 20 G  Catheter at skin depth: 12 cm  Test dose: 3 mL of lidocaine 1.5% with Epi 1-to-200,000  Additional Documentation: incremental injection, negative aspiration for heme and CSF, no signs/symptoms of IV or SA injection, no paresthesia on injection, no significant pain on injection and no significant complaints from patient  Needle localization: anatomical landmarks  Medications:  Bolus administered: 14 mL of 0.2% ropivacaine  Epinephrine added: none  Volume per aspiration: 3 mL  Time between aspirations: 0.1 minutes  Assessment  Upper dermatomal levels - Left: T11  Right: T11  Lower dermatomal level: N/A   Dermatomal levels determined by alcohol wipe  Ease of block: easy  Patient's tolerance of the procedure: comfortable throughout block and no complaints  Post dural Puncture Headache?: No

## 2017-05-03 NOTE — SUBJECTIVE & OBJECTIVE
Interval History:  Nathalie is a 26 y.o.  at 37w0d. She is doing well. Reports more contractions and pain rating /10. Desires epidural.    Objective:     Vital Signs (Most Recent):  Temp: 98.3 °F (36.8 °C) (17)  Pulse: (!) 111 (17)  Resp: 18 (17 190)  BP: (!) 137/92 (17) Vital Signs (24h Range):  Temp:  [97.4 °F (36.3 °C)-98.8 °F (37.1 °C)] 98.3 °F (36.8 °C)  Pulse:  [] 111  Resp:  [17-] 18  BP: (133-172)/() 137/92     Weight: 88.9 kg (196 lb)  Body mass index is 30.7 kg/(m^2).    FHT: 160 Cat 2 (minimial variability, accels, no decels at present)  TOCO: Q 2-3 minutes    Cervical Exam:  Dilation:  6  Effacement:  60 %  Station: -2  Presentation: Vertex     Significant Labs:  Lab Results   Component Value Date    GROUPTRH O POS 2017    HEPBSAG Negative 10/20/2016    STREPBCULT No Group B Streptococcus isolated 2017       I have personallly reviewed all pertinent lab results from the last 24 hours.    Physical Exam:   Constitutional: She appears well-developed and well-nourished.                             Psychiatric: Her mood appears anxious.

## 2017-05-03 NOTE — ANESTHESIA POSTPROCEDURE EVALUATION
"Anesthesia Post Evaluation    Patient: Nathalie Roland    Procedure(s) Performed: Procedure(s) (LRB):  DELIVERY- SECTION (N/A)    Final Anesthesia Type: epidural  Patient location during evaluation: labor & delivery  Patient participation: Yes- Able to Participate  Level of consciousness: awake and alert and oriented  Post-procedure vital signs: reviewed and stable  Pain management: adequate  Airway patency: patent  PONV status at discharge: No PONV  Anesthetic complications: no      Cardiovascular status: blood pressure returned to baseline  Respiratory status: unassisted, spontaneous ventilation and room air  Hydration status: euvolemic  Follow-up not needed.        Visit Vitals    BP (!) 148/105    Pulse (!) 117    Temp 36.8 °C (98.3 °F) (Oral)    Resp 17    Ht 5' 7" (1.702 m)    Wt 88.9 kg (196 lb)    LMP 2016 (Exact Date)    SpO2 100%    Breastfeeding No    BMI 30.7 kg/m2       Pain/Rubi Score: Pain Rating Prior to Med Admin: 8 (2017  6:47 PM)  Pain Rating Post Med Admin: 4 (2017  7:47 PM)  Rubi Score: 10 (5/3/2017 10:15 AM)      "

## 2017-05-03 NOTE — ANESTHESIA PREPROCEDURE EVALUATION
05/02/2017  Nathalie Roland is a 26 y.o., female.    Anesthesia Evaluation    I have reviewed the Patient Summary Reports.    I have reviewed the Nursing Notes.   I have reviewed the Medications.     Review of Systems  Anesthesia Hx:  No problems with previous Anesthesia    Social:  Non-Smoker, No Alcohol Use    Hematology/Oncology:  Hematology Normal   Oncology Normal     EENT/Dental:EENT/Dental Normal   Cardiovascular:   Hypertension, well controlled    Pulmonary:  Pulmonary Normal    Renal/:   Chronic Renal Disease Polycystic kidney   Hepatic/GI:  Hepatic/GI Normal    Musculoskeletal:  Spine Disorders: thoracic Degenerative disease    Neurological:   Headaches    Endocrine:  Endocrine Normal    Dermatological:  Skin Normal    Psych:  Psychiatric Normal           Physical Exam  General:  Well nourished    Airway/Jaw/Neck:  Airway Findings: Mouth Opening: Normal Tongue: Normal  General Airway Assessment: Adult  Mallampati: II  TM Distance: Normal, at least 6 cm  Jaw/Neck Findings:  Neck ROM: Normal ROM      Dental:  Dental Findings: In tact   Chest/Lungs:  Chest/Lungs Findings: Clear to auscultation, Normal Respiratory Rate     Heart/Vascular:  Heart Findings: Rate: Normal  Rhythm: Regular Rhythm  Sounds: Normal        Mental Status:  Mental Status Findings:  Cooperative, Alert and Oriented         Anesthesia Plan  Type of Anesthesia, risks & benefits discussed:  Anesthesia Type:  epidural, general  Patient's Preference:   Intra-op Monitoring Plan:   Intra-op Monitoring Plan Comments:   Post Op Pain Control Plan:   Post Op Pain Control Plan Comments:   Induction:   IV  Beta Blocker:  Patient is not currently on a Beta-Blocker (No further documentation required).       Informed Consent: Patient understands risks and agrees with Anesthesia plan.  Questions answered. Anesthesia consent signed  with patient.  ASA Score: 2     Day of Surgery Review of History & Physical: I have interviewed and examined the patient. I have reviewed the patient's H&P dated: 05/02/2017. There are no significant changes.          Ready For Surgery From Anesthesia Perspective.

## 2017-05-03 NOTE — PROGRESS NOTES
[facility]  Obstetrics  Labor Progress Note    Patient Name: Nathalie Roland  MRN: [id number]  Admission Date: 2017  Hospital Length of Stay: 1 days  Attending Physician: Noemi Valdez MD  Primary Care Provider: Primary Doctor No    Subjective:     Principal Problem:Supervision of high risk pregnancy in third trimester    Hospital Course:  17 at 0400hrs  POC discussed with parents, mom very anxious  NST, Serial BP Relax with  and try a warm bath  Start Cytotec IOL per protocol  17 @ 0730  Discussed with myself and Dr. Howard and will continue with cytotec IOL  Zamudio bulb placed @ 1700   SROM @ 2139  Zamudio fell out @ 2240  0020: IUPC and FSE placed to assess FHTs better      Interval History:  Nathalie is a 26 y.o.  at 37w1d. She is doing well. Comfortable after epidural.    Objective:     Vital Signs (Most Recent):  Temp: 98.3 °F (36.8 °C) (17)  Pulse: (!) 111 (17 220)  Resp: 18 (17 1905)  BP: (!) 137/92 (17) Vital Signs (24h Range):  Temp:  [97.4 °F (36.3 °C)-98.8 °F (37.1 °C)] 98.3 °F (36.8 °C)  Pulse:  [] 111  Resp:  [17-22] 18  BP: (133-172)/() 137/92     Weight: 88.9 kg (196 lb)  Body mass index is 30.7 kg/(m^2).    FHT: 160 Cat 2 (occasional late decelerations, changing position and O2 applied)  TOCO: Q 2-3 minutes    Cervical Exam:  Dilation:  7  Effacement:  75%  Station: -2  Presentation: Vertex     Significant Labs:  Lab Results   Component Value Date    GROUPTRH O POS 2017    HEPBSAG Negative 10/20/2016    STREPBCULT No Group B Streptococcus isolated 2017       I have personallly reviewed all pertinent lab results from the last 24 hours.    Physical Exam:   Constitutional: She is oriented to person, place, and time. She appears well-developed and well-nourished.             Abdominal: Soft.                 Neurological: She is alert and oriented to person, place, and time.    Skin: Skin is  warm and dry.    Psychiatric: Her mood appears anxious.       Assessment/Plan:     26 y.o. female  at 37w1d for:    * Supervision of high risk pregnancy in third trimester  Zamudio bulb out  Ok for epidural  Continue pitocin augmentation  Reviewed strip with Dr. Howard on the phone @ 1496. Will continue with IOL         Mckenize Metcalf CNM  Obstetrics  [facility]

## 2017-05-03 NOTE — PLAN OF CARE
Problem: Patient Care Overview  Goal: Plan of Care Review  Outcome: Ongoing (interventions implemented as appropriate)  Patient taking medication for blood pressure and anxiety; pain managed with toradol; bonding appropriately with baby; perea and SCDs in place

## 2017-05-03 NOTE — L&D DELIVERY NOTE
[facility]     Section     Operative Note      SUMMARY      Date of Procedure: 5/3/2017       Procedure: Procedure(s) (LRB):  DELIVERY- SECTION (N/A)      Surgeon(s) and Role:     * Amalia Metcalf MD - Primary      Assisting Surgeon: None      Pre-Operative Diagnosis: Failure to progress; Failed induction    Post-Operative Diagnosis: Post-Op Diagnosis Codes:    Failure to progress, Persistent OP ; chronic hypertension  Anesthesia: Spinal/Epidural      Technical Procedures Used: primary c/section-low transverse               Description of the Findings of the Procedure:          With patient in supine position, the legs are  and Zamudio Catheter placed and positioning to supine done.     Abdomen prepped with Chloroprep and 3 minute drying time allowed prior to draping of the abdomen.     Time out taken with OR team members.    Pfannenstiel Incision made through the skin, transverse fascial incision developed, rectus muscles  in the midline and the peritoneum entered.     no adhesions noted.    The lower uterine segment and position of the fetus identified.         Low Transverse Incision made through well developer lower uterine segment and extended laterally with blunt dissection.     Clear  fluid noted.  Infant delivered from vertex presentation.  Cord clamped after one minute and  handed to attending nurse.    Cord blood taken, placenta delivered.    The uterus was externalized (3 cm anterior fibroid noted)    The edges of the uterine incision are grasped with Villavicencio clamps at the angles and the inferior and superior midline edges of the incision.      Closure with running lock 0 Chromic, starting at each angle, tying in the midline.     Observation for bleeding with suture of any bleeding along the hysterotomy line. One additional figure of 8 suture was needed for hemostasis in the midline.    The uterus was returned to the abdomen.  Gutters cleared of  all clots and debris.  Uterine incision was re-examined and noted to be hemostatic.    Right and left adnexa with normal anatomy.  Interseed was placed across as an adhesive barrier.     Subfascial spaces were made hemostatic with cautery.  Fascia was closed with 0 vicryl in a running fashion after locking the first starting at     Each angle and tying in the midline.  .    Skin closure with 4 0 Vicryl subcuticular.    Aqua seal  dressing placed.       Significant Surgical Tasks Conducted by the Assistant(s), if Applicable: retraction      Complications: No      Estimated Blood Loss (EBL): * No values recorded between 5/3/2017  8:06 AM and 5/3/2017  9:52 AM *               Implants: * No implants in log *      Specimens: Specimen     None          Condition: Good      Disposition: PACU - hemodynamically stable.      Attestation: Good  Delivery Information for  Hugo Roland    Birth information:  YOB: 2018   Time of birth: 8:12 AM   Sex: female   Head Delivery Date/Time: 5/3/2017  8:12 AM   Delivery type: , Low Transverse   Gestational Age: 37w1d    Delivery Providers    Delivering clinician:  FRANNY VENTURA   Other personnel:   Provider Role   BOY JUDD, MARY KATE TAY, VIVIANA PALACIOS, ARELI HILLIARD                    Measurements    Weight:  2580 g Length:  47.5 cm   Head circum.:  33.5 cm Chest circum.:  29 cm   Abdominal girth:  30 cm          [place] Assessment    Living status:  Yes   Apgars    1 Minute:   5 Minute:   10 Minute 15 Minute 20 Minute   Skin Color: 1  1       Heart Rate: 2  2       Reflex Irritability: 2  2       Muscle Tone: 2  2       Respiratory Effort: 2  2       Total: 9  9                  Apgars Assigned By:  MARLEN TAY RN          Assisted Delivery Details:    Forceps attempted?:  No   Vacuum extractor attempted?:  No             Shoulder Dystocia    Shoulder dystocia present?:  No                                              Presentation and Position    Presentation: Vertex   Position:                    Interventions/Resuscitation    Method:  Bulb Suctioning, Tactile Stimulation, Deep Suctioning        Cord    Vessels:  3 vessels   Complications:  None   Delayed Cord Clamping?:  Yes   Cord Clamped Date/Time:  5/3/2017  8:13 AM   Cord Blood Disposition:  Lab   Gases Sent?:  No   Stem Cell Collection (by MD):  No        Placenta    Date and time:  5/3/2017  8:14 AM   Removal:  Manual removal   Appearance:  Intact   Placenta disposition:  Discarded            Labor Events:       labor: No     Labor Onset Date/Time:         Dilation Complete Date/Time:         Start Pushing Date/Time:       Rupture Date/Time:              Rupture type:           Fluid Amount:        Fluid Color:        Fluid Odor:        Membrane Status (PeriCalm): SRM (Spontaneous Rupture)      Rupture Date/Time (PeriCalm): 2017 21:40:00      Fluid Amount (PeriCalm): Large      Fluid Color (PeriCalm): Clear       steroids: None     Antibiotics given for GBS: No     Induction: amniotomy;balloon dilation (Zamudio);misoprostol;oxytocin     Indications for induction:  Hypertension     Augmentation:       Indications for augmentation:       Labor complications: Failure to Progress in First Stage     Additional complications:          Cervical ripening:                     Delivery:      Episiotomy: None     Indication for Episiotomy:       Perineal Lacerations: None Repaired:      Periurethral Laceration: none Repaired:     Labial Laceration: none Repaired:     Sulcus Laceration: none Repaired:     Vaginal Laceration: No Repaired:     Cervical Laceration: No Repaired:     Repair suture:       Repair # of packets: 6     Blood loss (ml):       Vaginal Sweep Performed: No     Surgicount Correct: Yes       Other providers:       Anesthesia    Method:  Epidural              Details (if applicable):  Trial of Labor Yes    Categorization:  Primary    Priority: Routine   Indications for : Failure to Progress   Incision Type: Low Transverse     Additional  information:  Forceps:    Vacuum:    Breech:    Observed anomalies    Other (Comments):

## 2017-05-03 NOTE — PROGRESS NOTES
Dr. Metcalf notified of patient's elevated blood pressures and temperature; new orders received for procardia xl 30mg x1 dose now and recheck blood pressure one hour after administration; setup with incentive spirometer

## 2017-05-03 NOTE — ANESTHESIA RELEASE NOTE
"Anesthesia Release from PACU Note    Patient: Nathalie Roland    Procedure(s) Performed: Procedure(s) (LRB):  DELIVERY- SECTION (N/A)    Anesthesia type: epidural    Post pain: Adequate analgesia    Post assessment: no apparent anesthetic complications and tolerated procedure well    Last Vitals:   Visit Vitals    BP (!) 148/105    Pulse (!) 117    Temp 36.8 °C (98.3 °F) (Oral)    Resp 17    Ht 5' 7" (1.702 m)    Wt 88.9 kg (196 lb)    LMP 2016 (Exact Date)    SpO2 100%    Breastfeeding No    BMI 30.7 kg/m2       Post vital signs: stable    Level of consciousness: awake, alert  and oriented    Nausea/Vomiting: no nausea/no vomiting    Complications: none    Airway Patency: patent    Respiratory: unassisted, spontaneous ventilation, room air    Cardiovascular: stable and blood pressure at baseline    Hydration: euvolemic  "

## 2017-05-03 NOTE — PROGRESS NOTES
[facility]  Obstetrics  Labor Progress Note    Patient Name: Nathalie Roland  MRN: [id number]  Admission Date: 2017  Hospital Length of Stay: 1 days  Attending Physician: Noemi Valdez MD  Primary Care Provider: Primary Doctor No    Subjective:     Principal Problem:Supervision of high risk pregnancy in third trimester    Hospital Course:  17 at 0400hrs  POC discussed with parents, mom very anxious  NST, Serial BP Relax with  and try a warm bath  Start Cytotec IOL per protocol  17 @ 0730  Discussed with myself and Dr. Howard and will continue with cytotec IOL  Zamudio bulb placed @ 1700   SROM @ 2139  Zamudio fell out @ 2240  0020: IUPC and FSE placed to assess FHTs better      Interval History:  Nathalie is a 26 y.o.  at 37w1d. Patient is aware she has made no cervical change since 1 am and cervical exam has actually regressed    Objective:     Vital Signs (Most Recent):  Temp: 99.6 °F (37.6 °C) (17 0420)  Pulse: (!) 111 (17 2202)  Resp: 18 (17 1905)  BP: (!) 137/92 (17 2202) Vital Signs (24h Range):  Temp:  [97.4 °F (36.3 °C)-99.6 °F (37.6 °C)] 99.6 °F (37.6 °C)  Pulse:  [] 111  Resp:  [17-18] 18  BP: (133-172)/() 137/92     Weight: 88.9 kg (196 lb)  Body mass index is 30.7 kg/(m^2).     cervix 7 cm, edematous per RN    Significant Labs:  Lab Results   Component Value Date    GROUPTRH O POS 2017    HEPBSAG Negative 10/20/2016    STREPBCULT No Group B Streptococcus isolated 2017       I have personallly reviewed all pertinent lab results from the last 24 hours.    Physical Exam   unchanged    Assessment/Plan:     26 y.o. female  at 37w1d for:    No new Assessment & Plan notes have been filed under this hospital service since the last note was generated.  Service: Obstetrics and Gynecology  Patient is ready to proceed with c/section for failure to progress.  OR/anesthesia aware.  Consent reviewed with  patient.        Amalia Metcalf MD  Obstetrics  [facility]

## 2017-05-03 NOTE — ASSESSMENT & PLAN NOTE
Zamudio bulb out  Ok for epidural  Continue pitocin augmentation  Reviewed strip with Dr. Howard on the phone @ 6551. Will continue with IOL

## 2017-05-03 NOTE — TRANSFER OF CARE
"Anesthesia Transfer of Care Note    Patient: Nathalie Roland    Procedure(s) Performed: Procedure(s) (LRB):  DELIVERY- SECTION (N/A)    Patient location: Labor and Delivery    Anesthesia Type: epidural    Transport from OR: Transported from OR on room air with adequate spontaneous ventilation    Post pain: adequate analgesia    Post assessment: no apparent anesthetic complications    Post vital signs: stable    Level of consciousness: awake, alert and oriented    Nausea/Vomiting: no nausea/vomiting    Complications: none          Last vitals:   Visit Vitals    BP (!) 148/105    Pulse 104    Temp 36.8 °C (98.3 °F) (Oral)    Resp 17    Ht 5' 7" (1.702 m)    Wt 88.9 kg (196 lb)    LMP 2016 (Exact Date)    SpO2 98%    Breastfeeding No    BMI 30.7 kg/m2     "

## 2017-05-03 NOTE — LACTATION NOTE
Lactation rounds  Mother is laying in bed, performing skin to skin with infant. Redness noted on left breast; mother states that baby was feeding shallow for a long time previously. Reviewed hand expression and nipple care. Lanolin cream given.   Lactation packet given and admit information reviewed. Mother verbalizes understanding of expected  behaviors and output for the first 48 hours of life.  Discussed the importance of cue based feedings on demand, unrestricted access to the breast, and frequent uninterrupted skin to skin contact.  Risk and implications of artificial nipples and supplementation discussed.  Encouraged mother to call for assistance when desired or when infant is showing signs of hunger, contact number provided, mother verbalizes understanding.     17 1500   Lactation Interventions   Attachment Promotion skin-to-skin contact encouraged;rooming-in promoted;privacy provided;family involvement promoted;face-to-face positioning promoted;environment adjusted;counseling provided;breastfeeding assistance provided;infant-mother separation minimized;role responsibility promoted   Breast Care: Breastfeeding manual expression to soften breast;milk massaged towards nipple;lanolin to nipple(s) applied   Breastfeeding Assistance both breasts offered each feeding;support offered;feeding cue recognition promoted;feeding on demand promoted   Maternal Breastfeeding Support encouragement offered;lactation counseling provided;infant-mother separation minimized

## 2017-05-03 NOTE — SUBJECTIVE & OBJECTIVE
Interval History:  Nathalie is a 26 y.o.  at 37w1d. Patient is aware she has made no cervical change since 1 am and cervical exam has actually regressed    Objective:     Vital Signs (Most Recent):  Temp: 99.6 °F (37.6 °C) (17 0420)  Pulse: (!) 111 (17)  Resp: 18 (17 1905)  BP: (!) 137/92 (17) Vital Signs (24h Range):  Temp:  [97.4 °F (36.3 °C)-99.6 °F (37.6 °C)] 99.6 °F (37.6 °C)  Pulse:  [] 111  Resp:  [17-18] 18  BP: (133-172)/() 137/92     Weight: 88.9 kg (196 lb)  Body mass index is 30.7 kg/(m^2).     cervix 7 cm, edematous per RN    Significant Labs:  Lab Results   Component Value Date    GROUPTRH O POS 2017    HEPBSAG Negative 10/20/2016    STREPBCULT No Group B Streptococcus isolated 2017       I have personallly reviewed all pertinent lab results from the last 24 hours.    Physical Exam   unchanged

## 2017-05-04 ENCOUNTER — PATIENT MESSAGE (OUTPATIENT)
Dept: NEPHROLOGY | Facility: CLINIC | Age: 27
End: 2017-05-04

## 2017-05-04 PROCEDURE — 25000003 PHARM REV CODE 250: Performed by: OBSTETRICS & GYNECOLOGY

## 2017-05-04 PROCEDURE — 11000001 HC ACUTE MED/SURG PRIVATE ROOM

## 2017-05-04 PROCEDURE — 63600175 PHARM REV CODE 636 W HCPCS: Performed by: OBSTETRICS & GYNECOLOGY

## 2017-05-04 PROCEDURE — 25000003 PHARM REV CODE 250: Performed by: ADVANCED PRACTICE MIDWIFE

## 2017-05-04 PROCEDURE — 94799 UNLISTED PULMONARY SVC/PX: CPT

## 2017-05-04 RX ORDER — IBUPROFEN 800 MG/1
800 TABLET ORAL EVERY 6 HOURS PRN
Status: DISCONTINUED | OUTPATIENT
Start: 2017-05-04 | End: 2017-05-05 | Stop reason: HOSPADM

## 2017-05-04 RX ORDER — NIFEDIPINE 30 MG/1
60 TABLET, EXTENDED RELEASE ORAL 2 TIMES DAILY
Status: DISCONTINUED | OUTPATIENT
Start: 2017-05-04 | End: 2017-05-05 | Stop reason: HOSPADM

## 2017-05-04 RX ORDER — KETOROLAC TROMETHAMINE 30 MG/ML
30 INJECTION, SOLUTION INTRAMUSCULAR; INTRAVENOUS EVERY 6 HOURS
Status: COMPLETED | OUTPATIENT
Start: 2017-05-04 | End: 2017-05-04

## 2017-05-04 RX ADMIN — DOCUSATE SODIUM 200 MG: 100 CAPSULE, LIQUID FILLED ORAL at 08:05

## 2017-05-04 RX ADMIN — HYDROXYZINE PAMOATE 25 MG: 25 CAPSULE ORAL at 11:05

## 2017-05-04 RX ADMIN — METHYLDOPA 500 MG: 250 TABLET ORAL at 12:05

## 2017-05-04 RX ADMIN — KETOROLAC TROMETHAMINE 30 MG: 30 INJECTION, SOLUTION INTRAMUSCULAR at 08:05

## 2017-05-04 RX ADMIN — OXYCODONE HYDROCHLORIDE AND ACETAMINOPHEN 1 TABLET: 5; 325 TABLET ORAL at 08:05

## 2017-05-04 RX ADMIN — SODIUM CHLORIDE, SODIUM LACTATE, POTASSIUM CHLORIDE, AND CALCIUM CHLORIDE: .6; .31; .03; .02 INJECTION, SOLUTION INTRAVENOUS at 12:05

## 2017-05-04 RX ADMIN — OXYCODONE HYDROCHLORIDE AND ACETAMINOPHEN 1 TABLET: 5; 325 TABLET ORAL at 11:05

## 2017-05-04 RX ADMIN — IBUPROFEN 800 MG: 800 TABLET ORAL at 05:05

## 2017-05-04 RX ADMIN — HYDROXYZINE PAMOATE 25 MG: 25 CAPSULE ORAL at 05:05

## 2017-05-04 RX ADMIN — OXYCODONE HYDROCHLORIDE AND ACETAMINOPHEN 1 TABLET: 10; 325 TABLET ORAL at 02:05

## 2017-05-04 RX ADMIN — NIFEDIPINE 60 MG: 30 TABLET, FILM COATED, EXTENDED RELEASE ORAL at 08:05

## 2017-05-04 RX ADMIN — KETOROLAC TROMETHAMINE 30 MG: 30 INJECTION, SOLUTION INTRAMUSCULAR at 02:05

## 2017-05-04 RX ADMIN — HYDROXYZINE PAMOATE 25 MG: 25 CAPSULE ORAL at 12:05

## 2017-05-04 RX ADMIN — METHYLDOPA 500 MG: 250 TABLET ORAL at 06:05

## 2017-05-04 RX ADMIN — OXYCODONE HYDROCHLORIDE AND ACETAMINOPHEN 1 TABLET: 5; 325 TABLET ORAL at 06:05

## 2017-05-04 RX ADMIN — HYDROXYZINE PAMOATE 25 MG: 25 CAPSULE ORAL at 06:05

## 2017-05-04 NOTE — PROGRESS NOTES
[facility]   Obstetrics  Postpartum Progress Note    Patient Name: Nathalie Roland  MRN: [id number]  Admission Date: 2017  Hospital Length of Stay: 2 days  Attending Physician: Noemi Valdez MD  Primary Care Provider: Primary Doctor No    Subjective:     Principal Problem:Supervision of high risk pregnancy in third trimester    Hospital Course:  Patient admitted for induction of labor at 37 weeks due to CHTN and polycystic kidney disease.  She underwent  for failure to progress at 7 cm dilation.  Postop course unremarkable.  Procardia XL 60 mg BID started for BP control.  Aldomet discontinued.    Interval History:  Nathalie Roland is a 26 y.o. female status post Primary  section on 5/3/17 08:12 AM  at 37w1d. Patient is doing well today. She denies  nausea, vomiting, fever or chills.  Patient repors mild abdominal pain that is adequately relieved by oral pain medications. Vaginal bleeding is light. Patient is voiding without difficulty and ambulating with no difficulty.     Patient is breastfeeding.      Objective:     Vital Signs (Most Recent):  Temp: 98.3 °F (36.8 °C) (17 0752)  Pulse: (!) 137 (17 0752)  Resp: 20 (17 0752)  BP: 130/81 (17 0752)  SpO2: 98 % (17 1044) Vital Signs (24h Range):  Temp:  [98.3 °F (36.8 °C)-100.2 °F (37.9 °C)] 98.3 °F (36.8 °C)  Pulse:  [] 137  Resp:  [0-20] 20  SpO2:  [98 %-100 %] 98 %  BP: (130-170)/() 130/81     Weight: 88.9 kg (196 lb)  Body mass index is 30.7 kg/(m^2).      Intake/Output Summary (Last 24 hours) at 17 0810  Last data filed at 17 0001   Gross per 24 hour   Intake          1181.25 ml   Output             2090 ml   Net          -908.75 ml       Significant Labs:  Lab Results   Component Value Date    GROUPTRH O POS 2017    HEPBSAG Negative 10/20/2016    STREPBCULT No Group B Streptococcus isolated 2017     No results for input(s): HGB, HCT in  the last 48 hours.      Physical Exam:   Constitutional: No distress.        Pulmonary/Chest: Effort normal.        Abdominal: Soft. There is no tenderness.             Musculoskeletal: She exhibits no edema or tenderness.             Assessment/Plan:     26 y.o. female  at 37w1d for:    Hypertension, renal  Discontinue Aldomet.  Begin Procardia XL 60 mg BID for blood pressure control.    Status post  section  Patient stable.        Disposition: As patient meets milestones, will plan to discharge.    Radha Trujillo MD  Obstetrics  [facility]

## 2017-05-04 NOTE — PLAN OF CARE
Problem: Patient Care Overview  Goal: Plan of Care Review  Outcome: Ongoing (interventions implemented as appropriate)  Patient progressing fine. No issues noted. Pain controlled with medication. Bonding well with baby.

## 2017-05-04 NOTE — PHYSICIAN QUERY
PT Name: Nathalie Roland  MR #: [id number]     Physician Query Form - Documentation Clarification      CDS/: Edna Yu RN-BSN        Contact information:[phone]    This form is a permanent document in the medical record.     Query Date: May 4, 2017    By submitting this query, we are merely seeking further clarification of documentation. Please utilize your independent clinical judgment when addressing the question(s) below.    The Medical record reflects the following:    Supporting Clinical Findings Location in Medical Record   IUP 37weeks even with polycystic kidney Dx and CHTN presents for cytotec IOL    Hypertension, renal  Discontinue Aldomet. Begin Procardia XL 60 mg BID for blood pressure control.    FM=923/109, 164/111, 169/113, 170/111    Prot/Creat Ratio=0.45    Treatment:  nifedipine 24 hr tablet 60 mg    methyldopa tablet 500 mg    H&P 5/2        OB Progress note 5/4@824am          Flowsheet 5/3    Urine, random 5/2    MAR 5/3-5/4                                                                                      Doctor, Please specify diagnosis or diagnoses associated with above clinical findings.    Provider Use Only    [x  ] Pre-existing hypertension secondary to renal disease  [  ] Pre-existing hypertension  [  ] Gestational hypertension  [  ] Pre-eclampsia with pre-existing hypertension  [  ] Pre-eclampsia ( Please specify degree )___________________________  [  ] Other condition ( Please specify)_________________________________                                                                                                                           [  ] Clinically undetermined

## 2017-05-04 NOTE — PLAN OF CARE
Problem: Patient Care Overview  Goal: Plan of Care Review  Patient progressing well. Bonding appropriately with . Pain controlled with oral medications. Ambulates independently and voids without difficulty. Aquacel intact, with no drainage. Light vaginal bleeding. Blood pressures stable with Aldomet. Will continue to monitor.

## 2017-05-04 NOTE — SUBJECTIVE & OBJECTIVE
Interval History:  Nathalie Roland is a 26 y.o. female status post Primary  section on 5/3/17 08:12 AM  at 37w1d. Patient is doing well today. She denies  nausea, vomiting, fever or chills.  Patient repors mild abdominal pain that is adequately relieved by oral pain medications. Vaginal bleeding is light. Patient is voiding without difficulty and ambulating with no difficulty.     Patient is breastfeeding.      Objective:     Vital Signs (Most Recent):  Temp: 98.3 °F (36.8 °C) (17 075)  Pulse: (!) 137 (17)  Resp: 20 (17 075)  BP: 130/81 (17 075)  SpO2: 98 % (17 1044) Vital Signs (24h Range):  Temp:  [98.3 °F (36.8 °C)-100.2 °F (37.9 °C)] 98.3 °F (36.8 °C)  Pulse:  [] 137  Resp:  [0-20] 20  SpO2:  [98 %-100 %] 98 %  BP: (130-170)/() 130/81     Weight: 88.9 kg (196 lb)  Body mass index is 30.7 kg/(m^2).      Intake/Output Summary (Last 24 hours) at 17 0810  Last data filed at 17 0001   Gross per 24 hour   Intake          1181.25 ml   Output             2090 ml   Net          -908.75 ml       Significant Labs:  Lab Results   Component Value Date    GROUPTRH O POS 2017    HEPBSAG Negative 10/20/2016    STREPBCULT No Group B Streptococcus isolated 2017     No results for input(s): HGB, HCT in the last 48 hours.      Physical Exam:   Constitutional: No distress.        Pulmonary/Chest: Effort normal.        Abdominal: Soft. There is no tenderness.             Musculoskeletal: She exhibits no edema or tenderness.

## 2017-05-04 NOTE — LACTATION NOTE
Lactation Rounds: infant output and weight loss WNL. Mother states that she has been feeding infant on the right breast primarily, due to pain on the left breast. Encouraged mother to feed infant on both breast, rotating which breast she starts with. Nipple care reviewed and discussed. Encouraged mother to call for latch assistance & assessment. Discussed early cue based feeds, frequent skin to skin contact & unrestricted access to the breast. Mother states that she can latch infant to the right breast without difficulty or pain and hear the infant swallowing. Mother verbalizes understanding of all education and instructions.     05/04/17 0845   Infant Assessment   Weight Loss (%) 2.7   Number of Stools (24 hours) 4   Number of Voids (24 hours) 6   Maternal Infant Feeding   Breastfeeding Education adequate milk volume;adequate infant intake;importance of skin-to-skin contact   Lactation Interventions   Attachment Promotion counseling provided;family involvement promoted;infant-mother separation minimized;rooming-in promoted;skin-to-skin contact encouraged   Breastfeeding Assistance both breasts offered each feeding;feeding cue recognition promoted;feeding on demand promoted;support offered   Maternal Breastfeeding Support lactation counseling provided;encouragement offered

## 2017-05-05 VITALS
OXYGEN SATURATION: 98 % | WEIGHT: 196 LBS | DIASTOLIC BLOOD PRESSURE: 89 MMHG | TEMPERATURE: 99 F | HEIGHT: 67 IN | HEART RATE: 116 BPM | BODY MASS INDEX: 30.76 KG/M2 | SYSTOLIC BLOOD PRESSURE: 144 MMHG | RESPIRATION RATE: 18 BRPM

## 2017-05-05 PROBLEM — D62 ACUTE BLOOD LOSS ANEMIA: Status: ACTIVE | Noted: 2017-05-05

## 2017-05-05 PROBLEM — R00.0 TACHYCARDIA: Status: ACTIVE | Noted: 2017-05-05

## 2017-05-05 LAB
BASOPHILS # BLD AUTO: 0.02 K/UL
BASOPHILS NFR BLD: 0.2 %
DIFFERENTIAL METHOD: ABNORMAL
EOSINOPHIL # BLD AUTO: 0.3 K/UL
EOSINOPHIL NFR BLD: 2.1 %
ERYTHROCYTE [DISTWIDTH] IN BLOOD BY AUTOMATED COUNT: 14 %
HCT VFR BLD AUTO: 29 %
HGB BLD-MCNC: 9.6 G/DL
LYMPHOCYTES # BLD AUTO: 2.2 K/UL
LYMPHOCYTES NFR BLD: 18 %
MCH RBC QN AUTO: 30.7 PG
MCHC RBC AUTO-ENTMCNC: 33.1 %
MCV RBC AUTO: 93 FL
MONOCYTES # BLD AUTO: 0.7 K/UL
MONOCYTES NFR BLD: 5.9 %
NEUTROPHILS # BLD AUTO: 9.2 K/UL
NEUTROPHILS NFR BLD: 73.8 %
PLATELET # BLD AUTO: 185 K/UL
PMV BLD AUTO: 10.2 FL
RBC # BLD AUTO: 3.13 M/UL
TSH SERPL DL<=0.005 MIU/L-ACNC: 3.49 UIU/ML
WBC # BLD AUTO: 12.47 K/UL

## 2017-05-05 PROCEDURE — 94799 UNLISTED PULMONARY SVC/PX: CPT

## 2017-05-05 PROCEDURE — 25000003 PHARM REV CODE 250: Performed by: ADVANCED PRACTICE MIDWIFE

## 2017-05-05 PROCEDURE — 85025 COMPLETE CBC W/AUTO DIFF WBC: CPT

## 2017-05-05 PROCEDURE — 93010 ELECTROCARDIOGRAM REPORT: CPT | Mod: ,,, | Performed by: INTERNAL MEDICINE

## 2017-05-05 PROCEDURE — 84443 ASSAY THYROID STIM HORMONE: CPT

## 2017-05-05 PROCEDURE — 25000003 PHARM REV CODE 250: Performed by: OBSTETRICS & GYNECOLOGY

## 2017-05-05 PROCEDURE — 93005 ELECTROCARDIOGRAM TRACING: CPT

## 2017-05-05 PROCEDURE — 36415 COLL VENOUS BLD VENIPUNCTURE: CPT

## 2017-05-05 RX ORDER — HYDROXYZINE PAMOATE 25 MG/1
25 CAPSULE ORAL EVERY 6 HOURS PRN
Qty: 30 CAPSULE | Refills: 1 | Status: SHIPPED | OUTPATIENT
Start: 2017-05-05 | End: 2018-03-01

## 2017-05-05 RX ORDER — OXYCODONE AND ACETAMINOPHEN 5; 325 MG/1; MG/1
1 TABLET ORAL EVERY 4 HOURS PRN
Qty: 30 TABLET | Refills: 0 | Status: SHIPPED | OUTPATIENT
Start: 2017-05-05 | End: 2017-06-29

## 2017-05-05 RX ORDER — NIFEDIPINE 60 MG/1
60 TABLET, EXTENDED RELEASE ORAL 2 TIMES DAILY
Qty: 60 TABLET | Refills: 11 | Status: SHIPPED | OUTPATIENT
Start: 2017-05-05 | End: 2017-05-23

## 2017-05-05 RX ORDER — FERROUS SULFATE 325(65) MG
325 TABLET ORAL DAILY
Qty: 30 TABLET | Refills: 3 | Status: SHIPPED | OUTPATIENT
Start: 2017-05-05 | End: 2017-10-31

## 2017-05-05 RX ADMIN — NIFEDIPINE 60 MG: 30 TABLET, FILM COATED, EXTENDED RELEASE ORAL at 08:05

## 2017-05-05 RX ADMIN — OXYCODONE HYDROCHLORIDE AND ACETAMINOPHEN 1 TABLET: 10; 325 TABLET ORAL at 07:05

## 2017-05-05 RX ADMIN — HYDROXYZINE PAMOATE 25 MG: 25 CAPSULE ORAL at 06:05

## 2017-05-05 RX ADMIN — OXYCODONE HYDROCHLORIDE AND ACETAMINOPHEN 1 TABLET: 5; 325 TABLET ORAL at 01:05

## 2017-05-05 RX ADMIN — IBUPROFEN 800 MG: 800 TABLET ORAL at 12:05

## 2017-05-05 RX ADMIN — DOCUSATE SODIUM 200 MG: 100 CAPSULE, LIQUID FILLED ORAL at 08:05

## 2017-05-05 RX ADMIN — OXYCODONE HYDROCHLORIDE AND ACETAMINOPHEN 1 TABLET: 10; 325 TABLET ORAL at 10:05

## 2017-05-05 RX ADMIN — HYDROXYZINE PAMOATE 25 MG: 25 CAPSULE ORAL at 12:05

## 2017-05-05 NOTE — LACTATION NOTE
Lactation Rounds: infant output and weight loss WNL. Upon entering room, mother is latched to the left breast in the cross cradle to cradle position, latch is deep, swallows are audible, mother denies pain. Mother states latch pain bilaterally is 4/10 that quickly subsides to 0-1/10 for remainder of feeding. Mother states that she feels infant was latched shallowly at one feeding last night and she thinks that caused the right nipple abrasion and the scab. Nipple care reinforced. Instructed mother to call if latch pain persist past another 72 hours or if pain increases. Mother denies further lactation needs or concerns at this time. Mother states she is ready to go home and breastfeed infant independently. Lactation discharge information reviewed.  Mother is aware of warm line, and outpatient consultations and monthly support gatherings. Encouraged mother to contact lactation with any questions, concerns, or problems. Contact numbers provided, and mother verbalizes understanding.     05/05/17 1130   Maternal Infant Assessment   Breast Shape round;Bilateral:   Breast Density Bilateral:;soft   Areola Bilateral:;elastic   Nipple Symptoms right:;abraded  (r abrasion 2-7 o'clocl, left not visualized infant latched)   Infant Assessment   Weight Loss (%) 4.8   Sucking Reflex present   Rooting Reflex present   Swallow Reflex present   Number of Stools (24 hours) 10   Number of Voids (24 hours) 6   LATCH Score   Latch 2-->grasps breast, tongue down, lips flanged, rhythmic sucking   Audible Swallowing 2-->spontaneous and intermittent (24 hrs old)   Type Of Nipple 2-->everted (after stimulation)   Comfort (Breast/Nipple) 1-->filling, red/small blisters/bruises, mild/mod discomfort   Hold (Positioning) 2-->no assist from staff, mother able to position/hold infant   Score (less than 7 for 2/more consecutive times, consult Lactation Consultant) 9   Maternal Infant Feeding   Maternal Emotional State independent;relaxed   Infant  Positioning cradle;cross-cradle  (left breast)   Signs of Milk Transfer audible swallow;infant jaw motion present   Presence of Pain yes   Nipple Shape After Feeding, Left WNL   Latch Assistance no   Breastfeeding Education adequate infant intake;adequate milk volume;importance of skin-to-skin contact;milk expression, hand   Feeding Infant   Effective Latch During Feeding yes   Audible Swallow yes   Lactation Referrals   Lactation Referrals support group   Lactation Interventions   Attachment Promotion breastfeeding assistance provided;infant-mother separation minimized;family involvement promoted;rooming-in promoted;skin-to-skin contact encouraged   Breastfeeding Assistance both breasts offered each feeding;feeding cue recognition promoted;feeding on demand promoted;infant latch-on verified;infant suck/swallow verified;support offered   Maternal Breastfeeding Support lactation counseling provided;encouragement offered

## 2017-05-05 NOTE — SUBJECTIVE & OBJECTIVE
Interval History:  Nathalie Roland is a 26 y.o. female status post Primary  section on 5/3/17 08:12 AM  at 37w1d. Patient is doing well today. She denies  nausea, vomiting, fever or chills.  Denies any palpitations, chest pain, SOB, dizziness, weakness, or lightheadedness.  Patient reports mild abdominal pain that is well relieved by  oral pain medications. Vaginal bleeding is light. Patient is voiding without difficulty and ambulating with no difficulty.   Patient is breastfeeding.      Objective:     Vital Signs (Most Recent):  Temp: 98.6 °F (37 °C) (17 0400)  Pulse: 84 (17 0400)  Resp: 18 (17 0015)  BP: 135/76 (17 0400)  SpO2: 98 % (17 1044) Vital Signs (24h Range):  Temp:  [98.2 °F (36.8 °C)-99 °F (37.2 °C)] 98.6 °F (37 °C)  Pulse:  [] 84  Resp:  [16-20] 18  BP: (130-143)/(76-93) 135/76     Weight: 88.9 kg (196 lb)  Body mass index is 30.7 kg/(m^2).    No intake or output data in the 24 hours ending 17 0720    Significant Labs:  Lab Results   Component Value Date    GROUPTRH O POS 2017    HEPBSAG Negative 10/20/2016    STREPBCULT No Group B Streptococcus isolated 2017     No results for input(s): HGB, HCT in the last 48 hours.    I have personallly reviewed all pertinent lab results from the last 24 hours.    Physical Exam:   Constitutional: She is oriented to person, place, and time. She appears well-developed and well-nourished. No distress.   Tachycardic, regular rhythm    HENT:   Head: Normocephalic and atraumatic.      Cardiovascular: Normal heart sounds.     Pulmonary/Chest: Effort normal.        Abdominal: Soft. She exhibits abdominal incision (Aquacel dressing clean, dry, and intact). She exhibits no distension and no mass. There is no tenderness. There is no rebound and no guarding.             Musculoskeletal: She exhibits edema (1+ BL LE edema).       Neurological: She is alert and oriented to person, place, and time.    Skin:  No rash noted.    Psychiatric: She has a normal mood and affect. Her behavior is normal. Judgment and thought content normal.

## 2017-05-05 NOTE — PROGRESS NOTES
[facility] -   Obstetrics  Postpartum Progress Note    Patient Name: Nathalie Roland  MRN: [id number]  Admission Date: 2017  Hospital Length of Stay: 3 days  Attending Physician: Noemi Valdez MD  Primary Care Provider: Primary Doctor No    Subjective:     Principal Problem:Supervision of high risk pregnancy in third trimester    Hospital Course:  Patient admitted for induction of labor at 37 weeks due to CHTN and polycystic kidney disease.  She underwent  for failure to progress at 7 cm dilation.  Postop course unremarkable.  Procardia XL 60 mg BID started for BP control.  Aldomet discontinued.  On POD#2, patient is tachycardic, but asymptomatic.  BP well-controlled on procardia.  EKG with sinus tachycardia.  Will check CBC and TSH, and discharge to home if labs okay.      Interval History:  Nathalie Roland is a 26 y.o. female status post Primary  section on 5/3/17 08:12 AM  at 37w1d. Patient is doing well today. She denies  nausea, vomiting, fever or chills.  Denies any palpitations, chest pain, SOB, dizziness, weakness, or lightheadedness.  Patient reports mild abdominal pain that is well relieved by  oral pain medications. Vaginal bleeding is light. Patient is voiding without difficulty and ambulating with no difficulty.   Patient is breastfeeding.      Objective:     Vital Signs (Most Recent):  Temp: 98.6 °F (37 °C) (17 0400)  Pulse: 84 (17 0400)  Resp: 18 (17 0015)  BP: 135/76 (17 0400)  SpO2: 98 % (17 1044) Vital Signs (24h Range):  Temp:  [98.2 °F (36.8 °C)-99 °F (37.2 °C)] 98.6 °F (37 °C)  Pulse:  [] 84  Resp:  [16-20] 18  BP: (130-143)/(76-93) 135/76     Weight: 88.9 kg (196 lb)  Body mass index is 30.7 kg/(m^2).    No intake or output data in the 24 hours ending 17 0720    Significant Labs:  Lab Results   Component Value Date    GROUPTRH O POS 2017    HEPBSAG Negative 10/20/2016    STREPBCULT No  Group B Streptococcus isolated 2017     No results for input(s): HGB, HCT in the last 48 hours.    I have personallly reviewed all pertinent lab results from the last 24 hours.    Physical Exam:   Constitutional: She is oriented to person, place, and time. She appears well-developed and well-nourished. No distress.   Tachycardic, regular rhythm    HENT:   Head: Normocephalic and atraumatic.      Cardiovascular: Normal heart sounds.     Pulmonary/Chest: Effort normal.        Abdominal: Soft. She exhibits abdominal incision (Aquacel dressing clean, dry, and intact). She exhibits no distension and no mass. There is no tenderness. There is no rebound and no guarding.             Musculoskeletal: She exhibits edema (1+ BL LE edema).       Neurological: She is alert and oriented to person, place, and time.    Skin: No rash noted.    Psychiatric: She has a normal mood and affect. Her behavior is normal. Judgment and thought content normal.       Assessment/Plan:     26 y.o. female  at 37w1d for:    Hypertension, renal  BP well-controlled on Procardia XL 60 mg BID.    Anxiety  Provide support  Vistaril 25 mg PO q 6hr.  Pt requests vistaril at discharge.    Status post  section  Patient stable.  Likely discharge this afternoon if CBC and TSH okay.      Disposition: As patient meets milestones, will plan to discharge home.    Sherry Keller MD  Obstetrics  [facility] -

## 2017-05-05 NOTE — DISCHARGE SUMMARY
[facility] -   Obstetrics  Discharge Summary      Patient Name: Nathalie Roland  MRN: [id number]  Admission Date: 2017  Hospital Length of Stay: 3 days  Discharge Date and Time:  2017 3:24 PM  Attending Physician: Noemi Valdez MD   Discharging Provider: Sherry Keller MD  Primary Care Provider: Primary Doctor No    HPI: 17 26 yr old  with IUP 37weeks even with polycystic kidney Dx and CHTN presents for cytotec IOL- Very anxious    Procedure(s) (LRB):  DELIVERY- SECTION (N/A)     Hospital Course:   Patient admitted for induction of labor at 37 weeks due to CHTN and polycystic kidney disease.  She underwent  for failure to progress at 7 cm dilation.  Postop course unremarkable.  Procardia XL 60 mg BID started for BP control.  Aldomet discontinued.  On POD#2, patient is tachycardic, but asymptomatic.  BP well-controlled on procardia.  EKG with sinus tachycardia.  CBC with mild anemia.  TSH normal.  Discharge to home in stable condition with iron supplementation.      Consults         Status Ordering Provider     Inpatient consult to Anesthesiology  Once     Provider:  (Not yet assigned)    Acknowledged KIA VENTURA     Inpatient consult to Lactation  Once     Provider:  (Not yet assigned)    Acknowledged LIDIA MANTILLA          Final Active Diagnoses:    Diagnosis Date Noted POA    PRINCIPAL PROBLEM:  Supervision of high risk pregnancy in third trimester [O09.93] 2017 Not Applicable    Tachycardia [R00.0] 2017 No    Acute blood loss anemia [D62] 2017 No    Failure to progress in labor [O62.2] 2017 No    Status post  section [Z98.891] 2017 Not Applicable    Anxiety [F41.9] 2017 Yes    Hypertension, renal [I12.9] 2013 Yes      Problems Resolved During this Admission:    Diagnosis Date Noted Date Resolved POA        Labs:   CBC   Recent Labs  Lab 17  1409   WBC 12.47   HGB 9.6*   HCT 29.0*           Feeding Method: breast    Immunizations     Date Immunization Status Dose Route/Site Given by    17 1124 MMR Deferred 0.5 mL Subcutaneous/Left deltoid aMgalys Poole RN    17 1125 Tdap Deferred 0.5 mL Intramuscular/Left deltoid Magalys Poole RN          Delivery:    Episiotomy: None   Lacerations: None   Repair suture:     Repair # of packets: 6   Blood loss (ml): 0     Birth information:  YOB: 2018   Time of birth: 8:12 AM   Sex: female   Delivery type: , Low Transverse   Gestational Age: 37w1d    Delivery Clinician:      Other providers:       Additional  information:  Forceps:    Vacuum:    Breech:    Observed anomalies      Living?:           APGARS  One minute Five minutes Ten minutes   Skin color:         Heart rate:         Grimace:         Muscle tone:         Breathing:         Totals: 9  9        Placenta: Delivered:       appearance    Pending Diagnostic Studies:     None          Discharged Condition: good    Disposition: Home or Self Care    Follow Up:  Follow-up Information     Follow up with OB GYN NURSE, ON On 5/10/2017.    Why:  dressing removal and bp check        Follow up with Amalia Metcalf MD In 4 weeks.    Specialty:  Obstetrics and Gynecology    Why:  post-op check    Contact information:    [address] [place] [id number] [phone]          Patient Instructions:     Diet general     Activity as tolerated     Lifting restrictions   Order Comments: No lifting >20 pounds     Call MD for:  temperature >100.4     Call MD for:  persistent nausea and vomiting or diarrhea     Call MD for:  severe uncontrolled pain     Call MD for:  redness, tenderness, or signs of infection (pain, swelling, redness, odor or green/yellow discharge around incision site)     Call MD for:  difficulty breathing or increased cough     Call MD for:  severe persistent headache     Call MD for:  worsening rash     Call MD for:  persistent dizziness,  light-headedness, or visual disturbances     Call MD for:  increased confusion or weakness     Leave dressing on - Keep it clean, dry, and intact until clinic visit       Medications:  Current Discharge Medication List      START taking these medications    Details   ferrous sulfate 325 mg (65 mg iron) Tab tablet Take 1 tablet (325 mg total) by mouth once daily.  Qty: 30 tablet, Refills: 3      hydrOXYzine pamoate (VISTARIL) 25 MG Cap Take 1 capsule (25 mg total) by mouth every 6 (six) hours as needed (anxiety).  Qty: 30 capsule, Refills: 1      nifedipine (PROCARDIA-XL) 60 MG (OSM) 24 hr tablet Take 1 tablet (60 mg total) by mouth 2 (two) times daily.  Qty: 60 tablet, Refills: 11      oxycodone-acetaminophen (PERCOCET) 5-325 mg per tablet Take 1 tablet by mouth every 4 (four) hours as needed for Pain.  Qty: 30 tablet, Refills: 0         CONTINUE these medications which have NOT CHANGED    Details   butalbital-acetaminophen-caffeine -40 mg (FIORICET) -40 mg per tablet Take 1-2 tablets by mouth every 6 (six) hours as needed for Headaches.  Qty: 30 tablet, Refills: 1    Associated Diagnoses: Headache, unspecified headache type      promethazine (PHENERGAN) 25 MG tablet Take 1 tablet (25 mg total) by mouth every 6 (six) hours as needed for Nausea.  Qty: 20 tablet, Refills: 0    Associated Diagnoses: Anxiety         STOP taking these medications       methyldopa (ALDOMET) 250 MG tablet Comments:   Reason for Stopping:               Sherry Keller MD  Obstetrics  [facility] -

## 2017-05-05 NOTE — PLAN OF CARE
Problem: Patient Care Overview  Goal: Individualization & Mutuality  Pt. Desires a vaginal delivery   Outcome: Outcome(s) achieved Date Met:  05/05/17  Patient to be discharged.

## 2017-05-05 NOTE — PLAN OF CARE
Problem: Patient Care Overview  Goal: Plan of Care Review  Patient progressing well. Bonding appropriately with . Pain controlled with oral medications. Ambulating independently and voiding without difficulty. Aquacel remains intact, with no drainage. Light vaginal bleeding. Vital signs stable. Will continue to monitor.

## 2017-05-05 NOTE — PROGRESS NOTES
EKG performed on pt as ordered by MD. 'Abnormal EKG' resulted. Unconfirmed copy labeled with pt ID sticker, placed in pt chart and Mirtha, patient's nurse, notified by RT.

## 2017-05-05 NOTE — PLAN OF CARE
Problem: Patient Care Overview  Goal: Plan of Care Review  Outcome: Ongoing (interventions implemented as appropriate)  Patient stable at this time. No acute distress or pain noted. Breastfeeding and pumping. Bonding well with infant. Questions encouraged and answered. Safety maintained throughout shift. Will continue to monitor.

## 2017-05-05 NOTE — NURSING
[employer] Engine Symphony pump at bedside. Instructed on proper usage and to adjust suction according to comfort level. Educated mother on frequency and duration of pumping in order to promote and maintain full milk supply. Hands on pumping technique reviewed. Encouraged hand expression after. Instructed mother on cleaning of breast pump parts. Reviewed proper milk handling, collection, storage, and transportation. Voices understanding.

## 2017-05-05 NOTE — DISCHARGE INSTRUCTIONS
Mother Self Care:    Activity: Avoid strenuous exercise and get adequate rest.  No driving until your physician gives you consent.  Emotional Changes: The grieving process has many different stages, be prepared to experience lots of emotional ups and downs. Identify people to be your support system, and do not hesitate to call our  if you need someone to talk to.   Breast Care: You may notice milk leaking from your breasts. Wear a support bra 24 hours a day for one week or wrap breasts in an ace bandage if needed to stop milk production.  Avoid stimulation to breasts.  You may use ice packs for discomfort.  Cookie-Care/Vaginal Bleeding: Remember to use your cookie-bottle after urinating.  Your flow will change from red, to pink, to yellow/white color over a period of 2 weeks.  Menstruation will return in 3-8 weeks.  Episiotomy Vaginal Delivery: Stitches will dissolve within 10 days to 3 weeks.  Warm baths, tucks, and dermoplast spray will promote healing.  Avoid bubble baths or strong soaps.   Section/Tubal Ligation: Keep incision clean and dry.  Please remove steri-strips in 5-7 days.  You may shower, but avoid baths.  Sexual Activity/Pelvic Rest: No sexual activity, tampons, or douching until your physician gives you consent.  Diet: Continue to eat from the five basic food groups, including plenty of protein, fruits, vegetables, and whole grains.  Limit empty calories and high fat foods.  Drink enough fluids to satisfy thirst.  Constipation/Hemorrhoids: Drink plenty of water.  You may take a stool softener or natural laxative (Metamucil). You may use tucks or hemorrhoid ointment and soak in a warm tub.    CALL YOUR OB DOCTOR IF ANY OF THE FOLLOWING OCCURS:  *Heavy bleeding - saturating a pad an hour or passing any large (2-3 inches in size) blood clots.  *Any pain, redness, or tenderness in lower leg.  *You cannot care for yourself  *Any signs of infection-      - Temperature greater than 100.5  degrees F      - Foul smelling vaginal discharge and/or incisional drainage      - Increased episiotomy or incisional pain      - Hot, hard, red or sore area on breast      - Flu-like symptoms      - Any urgency, frequency or burning with urination

## 2017-05-10 ENCOUNTER — CLINICAL SUPPORT (OUTPATIENT)
Dept: OBSTETRICS AND GYNECOLOGY | Facility: CLINIC | Age: 27
End: 2017-05-10
Payer: COMMERCIAL

## 2017-05-10 NOTE — PROGRESS NOTES
Nathalie presented today for a  bandage removal. There were no signs of infection, swelling, or bleeding. She states she's had three good bowel movements and no blood pressure symptoms or abnormal readings. Pt tolerated procedure well.

## 2017-05-18 ENCOUNTER — PATIENT MESSAGE (OUTPATIENT)
Dept: OBSTETRICS AND GYNECOLOGY | Facility: CLINIC | Age: 27
End: 2017-05-18

## 2017-05-18 ENCOUNTER — TELEPHONE (OUTPATIENT)
Dept: OBSTETRICS AND GYNECOLOGY | Facility: CLINIC | Age: 27
End: 2017-05-18

## 2017-05-18 ENCOUNTER — LAB VISIT (OUTPATIENT)
Dept: LAB | Facility: HOSPITAL | Age: 27
End: 2017-05-18
Attending: INTERNAL MEDICINE
Payer: COMMERCIAL

## 2017-05-18 DIAGNOSIS — R94.31 ABNORMAL EKG: Primary | ICD-10-CM

## 2017-05-18 DIAGNOSIS — Q61.3 POLYCYSTIC KIDNEY DISEASE: ICD-10-CM

## 2017-05-18 LAB
ANION GAP SERPL CALC-SCNC: 9 MMOL/L
BASOPHILS # BLD AUTO: 0.05 K/UL
BASOPHILS NFR BLD: 0.4 %
BUN SERPL-MCNC: 18 MG/DL
CALCIUM SERPL-MCNC: 8.9 MG/DL
CHLORIDE SERPL-SCNC: 103 MMOL/L
CO2 SERPL-SCNC: 23 MMOL/L
CREAT SERPL-MCNC: 1 MG/DL
DIFFERENTIAL METHOD: ABNORMAL
EOSINOPHIL # BLD AUTO: 0.3 K/UL
EOSINOPHIL NFR BLD: 2.3 %
ERYTHROCYTE [DISTWIDTH] IN BLOOD BY AUTOMATED COUNT: 13.1 %
EST. GFR  (AFRICAN AMERICAN): >60 ML/MIN/1.73 M^2
EST. GFR  (NON AFRICAN AMERICAN): >60 ML/MIN/1.73 M^2
GLUCOSE SERPL-MCNC: 104 MG/DL
HCT VFR BLD AUTO: 36.2 %
HGB BLD-MCNC: 11.5 G/DL
LYMPHOCYTES # BLD AUTO: 2.7 K/UL
LYMPHOCYTES NFR BLD: 22.9 %
MCH RBC QN AUTO: 29.5 PG
MCHC RBC AUTO-ENTMCNC: 31.8 %
MCV RBC AUTO: 93 FL
MONOCYTES # BLD AUTO: 0.6 K/UL
MONOCYTES NFR BLD: 5.2 %
NEUTROPHILS # BLD AUTO: 8 K/UL
NEUTROPHILS NFR BLD: 68.8 %
PLATELET # BLD AUTO: 324 K/UL
PMV BLD AUTO: 11 FL
POTASSIUM SERPL-SCNC: 3.9 MMOL/L
RBC # BLD AUTO: 3.9 M/UL
SODIUM SERPL-SCNC: 135 MMOL/L
WBC # BLD AUTO: 11.61 K/UL

## 2017-05-18 PROCEDURE — 36415 COLL VENOUS BLD VENIPUNCTURE: CPT | Mod: PO

## 2017-05-18 PROCEDURE — 80048 BASIC METABOLIC PNL TOTAL CA: CPT

## 2017-05-18 PROCEDURE — 85025 COMPLETE CBC W/AUTO DIFF WBC: CPT

## 2017-05-18 NOTE — TELEPHONE ENCOUNTER
Pt is asymptomatic and feels well. Her HR is slower now that she is off procardia.   Osiel says she is ok to f/u outpt for a f/u EKG. pls schedule this on same day of her upcoming nephrology apt.

## 2017-05-18 NOTE — TELEPHONE ENCOUNTER
----- Message from Nelly Anglin sent at 5/18/2017  3:04 PM CDT -----  Contact: pt  Pt returning nurse call, please call pt @ [phone].

## 2017-05-23 ENCOUNTER — OFFICE VISIT (OUTPATIENT)
Dept: NEPHROLOGY | Facility: CLINIC | Age: 27
End: 2017-05-23
Payer: COMMERCIAL

## 2017-05-23 ENCOUNTER — CLINICAL SUPPORT (OUTPATIENT)
Dept: CARDIOLOGY | Facility: CLINIC | Age: 27
End: 2017-05-23
Payer: COMMERCIAL

## 2017-05-23 VITALS
SYSTOLIC BLOOD PRESSURE: 170 MMHG | DIASTOLIC BLOOD PRESSURE: 118 MMHG | HEART RATE: 102 BPM | WEIGHT: 181.88 LBS | BODY MASS INDEX: 28.49 KG/M2

## 2017-05-23 DIAGNOSIS — R94.31 ABNORMAL EKG: ICD-10-CM

## 2017-05-23 DIAGNOSIS — Q61.3 POLYCYSTIC KIDNEY DISEASE: ICD-10-CM

## 2017-05-23 DIAGNOSIS — R80.9 NON-NEPHROTIC RANGE PROTEINURIA: ICD-10-CM

## 2017-05-23 DIAGNOSIS — I10 ESSENTIAL HYPERTENSION: Primary | ICD-10-CM

## 2017-05-23 PROCEDURE — 93000 ELECTROCARDIOGRAM COMPLETE: CPT | Mod: S$GLB,,, | Performed by: INTERNAL MEDICINE

## 2017-05-23 PROCEDURE — 99999 PR PBB SHADOW E&M-EST. PATIENT-LVL III: CPT | Mod: PBBFAC,,, | Performed by: INTERNAL MEDICINE

## 2017-05-23 PROCEDURE — 1160F RVW MEDS BY RX/DR IN RCRD: CPT | Mod: S$GLB,,, | Performed by: INTERNAL MEDICINE

## 2017-05-23 PROCEDURE — 99215 OFFICE O/P EST HI 40 MIN: CPT | Mod: S$GLB,,, | Performed by: INTERNAL MEDICINE

## 2017-05-23 RX ORDER — METHYLDOPA 250 MG/1
250 TABLET, FILM COATED ORAL 3 TIMES DAILY
Qty: 90 TABLET | Refills: 11 | Status: SHIPPED | OUTPATIENT
Start: 2017-05-23 | End: 2017-05-31 | Stop reason: SDUPTHER

## 2017-05-23 NOTE — PROGRESS NOTES
NEPHROLOGY CLINIC FOLLOWUP NOTE    Date of clinic visit: 5/23/17     REASON FOR FOLLOWUP AND CHIEF COMPLAINT: history of polycystic kidney disease.       HISTORY OF PRESENT ILLNESS: Ms. Morales is a 26-year-old  female with a history of polycystic kidney disease who presents for followup. Pt has now given birth at 37 weeks of gestation, and has a healthy baby girl. Pt had elevated BP and preeclampsia in 3rd trimester. Records further reviewed. BP has remained elevated post delivery. Pt was on nifedipine, but was stopped by OB/GYN due to tachycardia. Currently, pt is on on any BP meds. Pt has no c/o's, no h/a, no SOB. Plans to breast feed x 1 year. Pt was on methyldopa while she was pregnant with good response and tolerance. She reports no problems with the medicine. Pt also admits to eating a lot of salty, [place] foods.     PAST MEDICAL HISTORY:    1. Polycystic kidney disease diagnosed at the age of 12.    2. Hypertension.    3. Upper respiratory tract infections.    4. Hernia repair at the age of 2.    5. Migraine headaches.       FAMILY HISTORY: Reviewed. Mother with ESRD, status post kidney    transplant. Mother has polycystic kidney disease.     ALLERGIES: Reviewed. No known drug allergies.       MEDICATIONS: methyldopa 500 mg po bid     REVIEW OF SYSTEMS: No recent hospitalizations. No recent other issues.    HEAD, EAR, EYES, NOSE, THROAT: Negative.    CARDIAC: Negative.    PULMONARY: Negative.    GASTROINTESTINAL: Negative.    GENITOURINARY: Negative.    PSYCHOLOGICAL: Negative.    NEUROLOGICAL: Negative.    The rest of the review of systems negative.       PHYSICAL EXAMINATION:    VITAL SIGNS: Blood pressure on arrival was 170/112, Pulse 102, Weight is 181 lbs, last visit 193 lbs   has accompanied pt to the clinic  GENERAL: She is ambulatory, in no acute distress.    HEART: Regular rate and rhythm.  s1 and s2 audible  CHEST: Clear to auscultation.  no rales no wheezes  ABDOMEN: Soft,  nontender.    EXTREMITIES: Showed no edema.       Labs: reviewed  BMP  Lab Results   Component Value Date     (L) 05/18/2017    K 3.9 05/18/2017     05/18/2017    CO2 23 05/18/2017    BUN 18 05/18/2017    CREATININE 1.0 05/18/2017    CALCIUM 8.9 05/18/2017    ANIONGAP 9 05/18/2017    ESTGFRAFRICA >60.0 05/18/2017    EGFRNONAA >60.0 05/18/2017     Lab Results   Component Value Date    WBC 11.61 05/18/2017    HGB 11.5 (L) 05/18/2017    HCT 36.2 (L) 05/18/2017    MCV 93 05/18/2017     05/18/2017       Urine p/cr 810 mg, from 180 mg  ua reviewed     ASSESSMENT AND PLAN: This is a 26 year-old female with history of polycystic kidney disease who presents for followup.  Pt is pregnant  The impression is as follows:       1. Renal function is stable, but noted slightly higher than before  H/o of PCKD  Noted mild proteinuria.  Pt may be at risk of further renal damage if she becomes pregnant again, pt and her  were advised.  Mild proteinuria, worse as noted.     2. Hypertension. Blood pressure is not controlled post delivery  Options of BP meds discussed with pt and her   Advised that most BP meds carry some risk with lactation. Safer BP meds include methyldopa, CCB's, and some BB's.  Was on methyldopa during pregnancy, will re-start  Will need close f/u     3. Headaches. No issues today.   MRI of the head was normal in 2007.      PLANS AND RECOMMENDATIONS:    As detailed above  RTC 1 wk  Restart methyldopa 250 mg po tid  Discussed with pt and her , opportunity for questions and discussion provided.  Total time spent 40 min. More than 50% spent on counseling and coordination of care.     Mee Rabago M.D.

## 2017-05-26 ENCOUNTER — LAB VISIT (OUTPATIENT)
Dept: LAB | Facility: HOSPITAL | Age: 27
End: 2017-05-26
Attending: INTERNAL MEDICINE
Payer: COMMERCIAL

## 2017-05-26 DIAGNOSIS — I10 ESSENTIAL HYPERTENSION: ICD-10-CM

## 2017-05-26 LAB
ANION GAP SERPL CALC-SCNC: 8 MMOL/L
BUN SERPL-MCNC: 14 MG/DL
CALCIUM SERPL-MCNC: 8.7 MG/DL
CHLORIDE SERPL-SCNC: 107 MMOL/L
CO2 SERPL-SCNC: 23 MMOL/L
CREAT SERPL-MCNC: 1 MG/DL
EST. GFR  (AFRICAN AMERICAN): >60 ML/MIN/1.73 M^2
EST. GFR  (NON AFRICAN AMERICAN): >60 ML/MIN/1.73 M^2
GLUCOSE SERPL-MCNC: 78 MG/DL
POTASSIUM SERPL-SCNC: 4.3 MMOL/L
SODIUM SERPL-SCNC: 138 MMOL/L

## 2017-05-26 PROCEDURE — 80048 BASIC METABOLIC PNL TOTAL CA: CPT

## 2017-05-26 PROCEDURE — 36415 COLL VENOUS BLD VENIPUNCTURE: CPT | Mod: PO

## 2017-05-28 ENCOUNTER — PATIENT MESSAGE (OUTPATIENT)
Dept: OBSTETRICS AND GYNECOLOGY | Facility: CLINIC | Age: 27
End: 2017-05-28

## 2017-05-31 ENCOUNTER — OFFICE VISIT (OUTPATIENT)
Dept: NEPHROLOGY | Facility: CLINIC | Age: 27
End: 2017-05-31
Payer: COMMERCIAL

## 2017-05-31 ENCOUNTER — POSTPARTUM VISIT (OUTPATIENT)
Dept: OBSTETRICS AND GYNECOLOGY | Facility: CLINIC | Age: 27
End: 2017-05-31
Payer: COMMERCIAL

## 2017-05-31 VITALS
HEART RATE: 110 BPM | WEIGHT: 179.44 LBS | DIASTOLIC BLOOD PRESSURE: 100 MMHG | SYSTOLIC BLOOD PRESSURE: 166 MMHG | HEIGHT: 67 IN | BODY MASS INDEX: 28.16 KG/M2

## 2017-05-31 VITALS
SYSTOLIC BLOOD PRESSURE: 124 MMHG | DIASTOLIC BLOOD PRESSURE: 98 MMHG | HEIGHT: 67 IN | WEIGHT: 181 LBS | BODY MASS INDEX: 28.41 KG/M2

## 2017-05-31 DIAGNOSIS — I10 ESSENTIAL HYPERTENSION: ICD-10-CM

## 2017-05-31 DIAGNOSIS — Q61.3 POLYCYSTIC KIDNEY DISEASE: Primary | ICD-10-CM

## 2017-05-31 PROCEDURE — 0503F POSTPARTUM CARE VISIT: CPT | Mod: S$GLB,,, | Performed by: OBSTETRICS & GYNECOLOGY

## 2017-05-31 PROCEDURE — 99999 PR PBB SHADOW E&M-EST. PATIENT-LVL II: CPT | Mod: PBBFAC,,, | Performed by: OBSTETRICS & GYNECOLOGY

## 2017-05-31 PROCEDURE — 99214 OFFICE O/P EST MOD 30 MIN: CPT | Mod: S$GLB,,, | Performed by: INTERNAL MEDICINE

## 2017-05-31 PROCEDURE — 99999 PR PBB SHADOW E&M-EST. PATIENT-LVL III: CPT | Mod: PBBFAC,,, | Performed by: INTERNAL MEDICINE

## 2017-05-31 RX ORDER — METHYLDOPA 500 MG/1
500 TABLET, FILM COATED ORAL EVERY 8 HOURS
Qty: 90 TABLET | Refills: 11 | Status: SHIPPED | OUTPATIENT
Start: 2017-05-31 | End: 2017-06-02

## 2017-05-31 NOTE — PROGRESS NOTES
Subjective:       Patient ID: Nathalie Roland is a 26 y.o. female.    Chief Complaint:  Postpartum Care      History of Present Illness  HPI  Postoperative Follow-up  Patient presents to the clinic 4 weeks status post  for failure to progress. Eating a regular diet without difficulty. Bowel movements are normal. The patient is not having any pain.    Pt c/o increased anxiety--told by nephrologist that she may have to stop breast feeding in order to better control her bp--she is not happy by this; does not feel that she is depressed  Minimal vaginal bleeding  Plans condoms for contraception  GYN & OB History  Patient's last menstrual period was 2016 (exact date).   Date of Last Pap: 2016    OB History    Para Term  AB Living   1 1 1 0 0 1   SAB TAB Ectopic Multiple Live Births   0 0 0 0 1      # Outcome Date GA Lbr Kulwant/2nd Weight Sex Delivery Anes PTL Lv   1 Term 17 37w1d  2.58 kg (5 lb 11 oz) F CS-LTranv EPI N ANALIA      Complications: Failure to Progress in First Stage          Review of Systems  Review of Systems   Constitutional: Negative for activity change, appetite change, chills, diaphoresis, fatigue, fever and unexpected weight change.   HENT: Negative for mouth sores and tinnitus.    Eyes: Negative for discharge and visual disturbance.   Respiratory: Negative for cough, shortness of breath and wheezing.    Cardiovascular: Negative for chest pain, palpitations and leg swelling.   Gastrointestinal: Negative for abdominal pain, bloating, blood in stool, constipation, diarrhea, nausea and vomiting.   Endocrine: Negative for diabetes, hair loss, hot flashes, hyperthyroidism and hypothyroidism.   Genitourinary: Negative for decreased libido, dyspareunia, dysuria, flank pain, frequency, genital sores, hematuria, menorrhagia, menstrual problem, pelvic pain, urgency, vaginal bleeding, vaginal discharge, vaginal pain, dysmenorrhea, urinary incontinence, postcoital  bleeding, postmenopausal bleeding and vaginal odor.   Musculoskeletal: Negative for back pain and myalgias.   Skin:  Negative for rash, no acne and hair changes.   Neurological: Negative for seizures, syncope, numbness and headaches.   Hematological: Negative for adenopathy. Does not bruise/bleed easily.   Psychiatric/Behavioral: Negative for depression and sleep disturbance. The patient is nervous/anxious.    Breast: Negative for breast mass, breast pain, nipple discharge and skin changes          Objective:    Physical Exam:   Constitutional: She is oriented to person, place, and time. She appears well-developed.    HENT:   Head: Normocephalic.    Eyes: Pupils are equal, round, and reactive to light.    Neck: Normal range of motion.    Cardiovascular: Normal rate and regular rhythm.     Pulmonary/Chest: Effort normal and breath sounds normal.        Abdominal: Soft. Bowel sounds are normal. She exhibits no abdominal incision.             Musculoskeletal: Normal range of motion.       Neurological: She is alert and oriented to person, place, and time.    Skin: Skin is warm and dry.    Psychiatric: She has a normal mood and affect. Her behavior is normal. Judgment and thought content normal.          Assessment:     Encounter Diagnosis   Name Primary?    Postpartum care following  delivery Yes               Plan:      Ok to return to usual activities--exercise, driving, intercourse  Continue prenatal vitamin  Pt aware ww exam due in 2017  Safe sex

## 2017-05-31 NOTE — PROGRESS NOTES
NEPHROLOGY CLINIC FOLLOWUP NOTE    Date of clinic visit: 5/31/17     REASON FOR FOLLOWUP AND CHIEF COMPLAINT: history of polycystic kidney disease and uncontrolled BP post delivery     HISTORY OF PRESENT ILLNESS: Ms. Morales is a 26-year-old  female with a history of polycystic kidney disease who presents for followup. Pt is s/p delivery of a healthy baby girl at 37 wks of gestation. She has remained hypertensive post delivery. Nifedipine was tried, but it caused tachycardia. Methyldopa, which she was taking during pregnancy, was re-started at 250 mg po tid. She presents for f/u, she was last seen 1 wk ago. Pt is tolerating methyldopa well. No new c/o's today. No new issues     PAST MEDICAL HISTORY:    1. Polycystic kidney disease diagnosed at the age of 12.    2. Hypertension.    3. Upper respiratory tract infections.    4. Hernia repair at the age of 2.    5. Migraine headaches.       FAMILY HISTORY: Reviewed. Mother with ESRD, status post kidney    transplant. Mother has polycystic kidney disease.     ALLERGIES: Reviewed. No known drug allergies.       MEDICATIONS: methyldopa 250 mg po tid     REVIEW OF SYSTEMS: No recent hospitalizations. No recent other issues.    HEAD, EAR, EYES, NOSE, THROAT: Negative.    CARDIAC: Negative.    PULMONARY: Negative.    GASTROINTESTINAL: Negative.    GENITOURINARY: Negative.    PSYCHOLOGICAL: Negative.    NEUROLOGICAL: Negative.    The rest of the review of systems negative.       PHYSICAL EXAMINATION:    VITAL SIGNS: Blood pressure on arrival was 166/100, repeat after 5 min 156/95, BP last visit was 170/112, Pulse 110, repeat pulse 100, Weight is 179 lbs, last visit 181 lbs  GENERAL: She is ambulatory, in no acute distress.    HEART: Regular rate and rhythm.  s1 and s2 audible  CHEST: Clear to auscultation.  no rales no wheezes  ABDOMEN: Soft, nontender.    EXTREMITIES: Showed no edema.       Labs: reviewed  BMP  Lab Results   Component Value Date     05/26/2017     K 4.3 05/26/2017     05/26/2017    CO2 23 05/26/2017    BUN 14 05/26/2017    CREATININE 1.0 05/26/2017    CALCIUM 8.7 05/26/2017    ANIONGAP 8 05/26/2017    ESTGFRAFRICA >60.0 05/26/2017    EGFRNONAA >60.0 05/26/2017        Urine p/cr 810 mg, from 180 mg  ua reviewed     ASSESSMENT AND PLAN: This is a 26 year-old female with history of polycystic kidney disease who presents for followup.  Pt is pregnant  The impression is as follows:       1. Renal function is stable.  H/o of PCKD  Noted mild proteinuria, but worse.  Proteinuria is concerning.  Ideally, would be taking an ACE-I (was on one before she became pregnant) for both BP and proteinuria, but safety unknown for lactation.  Pt may be at risk of further renal damage if she becomes pregnant again, pt and her  were advised before by me.     2. Hypertension. Blood pressure is not controlled post delivery, improved, but still high  Options of BP meds discussed with pt  Advised that most BP meds carry some risk with lactation. Safer BP meds include methyldopa, CCB's, and some BB's.  Will increase methyldopa dosa  CCB's remain an option   Will need close f/u     3. Headaches. No issues today.   MRI of the head was normal in 2007.      PLANS AND RECOMMENDATIONS:    As detailed above  Increase methyldopa to 500 mg po tid.  RTC 1 wk  Restart methyldopa 250 mg po tid  Discussed with pt and her , opportunity for questions and discussion provided.  Total time spent 25 min. More than 50% spent on counseling and coordination of care.  RTC 1 wk for BP f/u and medication mgmt.     Mee Rabago M.D.

## 2017-06-01 ENCOUNTER — PATIENT MESSAGE (OUTPATIENT)
Dept: OBSTETRICS AND GYNECOLOGY | Facility: CLINIC | Age: 27
End: 2017-06-01

## 2017-06-02 ENCOUNTER — TELEPHONE (OUTPATIENT)
Dept: OBSTETRICS AND GYNECOLOGY | Facility: CLINIC | Age: 27
End: 2017-06-02

## 2017-06-02 ENCOUNTER — TELEPHONE (OUTPATIENT)
Dept: LACTATION | Facility: CLINIC | Age: 27
End: 2017-06-02

## 2017-06-02 ENCOUNTER — TELEPHONE (OUTPATIENT)
Dept: OBSTETRICS AND GYNECOLOGY | Facility: HOSPITAL | Age: 27
End: 2017-06-02

## 2017-06-02 RX ORDER — LABETALOL 100 MG/1
100 TABLET, FILM COATED ORAL 3 TIMES DAILY
Qty: 90 TABLET | Refills: 11 | Status: SHIPPED | OUTPATIENT
Start: 2017-06-02 | End: 2017-06-29 | Stop reason: SDUPTHER

## 2017-06-02 RX ORDER — METHYLDOPA 500 MG/1
500 TABLET, FILM COATED ORAL EVERY 8 HOURS
Qty: 90 TABLET | Refills: 11 | Status: SHIPPED | OUTPATIENT
Start: 2017-06-02 | End: 2017-10-31 | Stop reason: ALTCHOICE

## 2017-06-02 NOTE — TELEPHONE ENCOUNTER
Mother called to ensure that her medication would be safe for breastfeeding. Information and discussion made using Jack Sherman 17th edition of Medications & Mothers Milk. The amount of drug excreted into milk depends on a number of kinetic factors:    1) the lipid solubility of the drug,  2) the molecular size of the drug,  3) the blood level attained in the maternal circulation,  4) protein binding in the maternal circulation,  5) oral bioavailability in the infant, and the mother, and  6) the half-life in the maternal and infant's plasma compartments.    Using these kinetic terms, one can frequently estimate the probability that a medications will enter will. But the only true test is the research studies published in the literature. With these in hand, we can frequently estimate the absolute dosage an infant will receive from his/her mother's milk. But remember, all patients are unique, and wide variations can exist.    Mother plans to use an ACE inhibitor, Enalapril. It is a L2- limited data, probably compatible.     According to Jack Sherman 17th edition of Medications & Mothers Milk, the milk/plasma ratio for enalapril averaged 0.013 and 0.025 in one study. Some caution is recommended in using ACE inhibitor with premature infant.     The molecular weight of this medicine is 492- according to this author, a molecular weight of less than 200 is likely to go into the breastmilk. The Relative infant dose is 0.07 to 0.2% many researchers now suggest that anything less than 10 % of the maternal dose is probably safe.     As for Labetalol, it is L2, limited data, probably compatible. The molecular weight is 328, RID 0.2-0.6% with a M/P ratio of 0.8 to 2.6.     Referred mother to [facility] at [phone] to educate herself and her providers. Referred mother to her pediatrician, and encouraged mother to give the [facility] phone number. Sent mother the link for [facility]. Mother is now  educated on her medications, and will contact her pediatrician with those informations.

## 2017-06-02 NOTE — TELEPHONE ENCOUNTER
----- Message from Nickolas Poole MD sent at 5/29/2017  9:56 AM CDT -----  Hans Franklin    The ecg interpretation on 5/5 has been changed after it was read by Cardiology.  I agree with both ecg interpretations that are in epic.  If she is asymptomatic from cardiac standpoint, may not be much to do.    Have a good day    Nickolas      ----- Message -----  From: Noemi Valdez MD  Sent: 5/28/2017   4:21 PM  To: MD Hans Green,  This pt had a post delivery EKG on 5/5 that showed sinus tach and 'possible h/o MI'. Did these changes resolve? Based on the 5/23 EKG does she need a f/u w/ cardiology?  She is asymptomatic.  Holly

## 2017-06-05 ENCOUNTER — PATIENT MESSAGE (OUTPATIENT)
Dept: NEPHROLOGY | Facility: CLINIC | Age: 27
End: 2017-06-05

## 2017-06-05 RX ORDER — LISINOPRIL 5 MG/1
5 TABLET ORAL DAILY
Qty: 90 TABLET | Refills: 3 | Status: SHIPPED | OUTPATIENT
Start: 2017-06-05 | End: 2017-10-31

## 2017-06-13 ENCOUNTER — PATIENT MESSAGE (OUTPATIENT)
Dept: NEPHROLOGY | Facility: CLINIC | Age: 27
End: 2017-06-13

## 2017-06-29 ENCOUNTER — LAB VISIT (OUTPATIENT)
Dept: LAB | Facility: HOSPITAL | Age: 27
End: 2017-06-29
Attending: INTERNAL MEDICINE
Payer: COMMERCIAL

## 2017-06-29 ENCOUNTER — OFFICE VISIT (OUTPATIENT)
Dept: NEPHROLOGY | Facility: CLINIC | Age: 27
End: 2017-06-29
Payer: COMMERCIAL

## 2017-06-29 VITALS
HEIGHT: 67 IN | HEART RATE: 105 BPM | WEIGHT: 179.44 LBS | SYSTOLIC BLOOD PRESSURE: 139 MMHG | DIASTOLIC BLOOD PRESSURE: 94 MMHG | BODY MASS INDEX: 28.16 KG/M2

## 2017-06-29 DIAGNOSIS — I10 ESSENTIAL HYPERTENSION: Primary | ICD-10-CM

## 2017-06-29 DIAGNOSIS — Q61.3 POLYCYSTIC KIDNEY DISEASE: ICD-10-CM

## 2017-06-29 DIAGNOSIS — Z71.89 ENCOUNTER FOR MEDICATION REVIEW AND COUNSELING: ICD-10-CM

## 2017-06-29 DIAGNOSIS — Q61.3 POLYCYSTIC KIDNEY DISEASE: Primary | ICD-10-CM

## 2017-06-29 LAB
ALBUMIN SERPL BCP-MCNC: 3.8 G/DL
ANION GAP SERPL CALC-SCNC: 9 MMOL/L
BUN SERPL-MCNC: 19 MG/DL
CALCIUM SERPL-MCNC: 9.8 MG/DL
CHLORIDE SERPL-SCNC: 106 MMOL/L
CO2 SERPL-SCNC: 24 MMOL/L
CREAT SERPL-MCNC: 1.2 MG/DL
EST. GFR  (AFRICAN AMERICAN): >60 ML/MIN/1.73 M^2
EST. GFR  (NON AFRICAN AMERICAN): >60 ML/MIN/1.73 M^2
GLUCOSE SERPL-MCNC: 92 MG/DL
PHOSPHATE SERPL-MCNC: 4.4 MG/DL
POTASSIUM SERPL-SCNC: 4.3 MMOL/L
SODIUM SERPL-SCNC: 139 MMOL/L

## 2017-06-29 PROCEDURE — 36415 COLL VENOUS BLD VENIPUNCTURE: CPT

## 2017-06-29 PROCEDURE — 80069 RENAL FUNCTION PANEL: CPT

## 2017-06-29 PROCEDURE — 99999 PR PBB SHADOW E&M-EST. PATIENT-LVL III: CPT | Mod: PBBFAC,,, | Performed by: INTERNAL MEDICINE

## 2017-06-29 PROCEDURE — 99215 OFFICE O/P EST HI 40 MIN: CPT | Mod: S$GLB,,, | Performed by: INTERNAL MEDICINE

## 2017-06-29 RX ORDER — LABETALOL 100 MG/1
100 TABLET, FILM COATED ORAL 4 TIMES DAILY
Qty: 120 TABLET | Refills: 11 | Status: SHIPPED | OUTPATIENT
Start: 2017-06-29 | End: 2018-07-15 | Stop reason: SDUPTHER

## 2017-06-29 NOTE — PROGRESS NOTES
NEPHROLOGY CLINIC FOLLOWUP NOTE    Date of clinic visit: 6/29/17     REASON FOR FOLLOWUP AND CHIEF COMPLAINT: history of polycystic kidney disease and uncontrolled BP post delivery     HISTORY OF PRESENT ILLNESS: Ms. Morales is a 26-year-old  female with a history of polycystic kidney disease who presents for followup. Pt is s/p delivery of a healthy baby girl at 37 wks of gestation. Her BP has remained elevated post delivery. Medications were reviewed wit pt. Other providers' notes reviewed. Pt is taking methyldopa and labetalol without problems. Noted addition of a very small dose of lisinopril 5 mg qd by OB/Gyn. Nifedipine was tried before, but it caused tachycardia. No new c/o's today.      PAST MEDICAL HISTORY:    1. Polycystic kidney disease diagnosed at the age of 12.    2. Hypertension.    3. Upper respiratory tract infections.    4. Hernia repair at the age of 2.    5. Migraine headaches.       FAMILY HISTORY: Reviewed. Mother with ESRD, status post kidney    transplant. Mother has polycystic kidney disease.     ALLERGIES: Reviewed. No known drug allergies.       MEDICATIONS: methyldopa 500 mg po tid, labetalol 100 mg tid, lisinopril 5 mg qd     REVIEW OF SYSTEMS: No recent hospitalizations. No recent other issues.    HEAD, EAR, EYES, NOSE, THROAT: Negative.    CARDIAC: Negative.    PULMONARY: Negative.    GASTROINTESTINAL: Negative.    GENITOURINARY: Negative.    PSYCHOLOGICAL: Negative.    NEUROLOGICAL: Negative.    The rest of the review of systems negative.       PHYSICAL EXAMINATION:    VITAL SIGNS: Blood pressure on arrival was 139/94, Pulse 105, Weight is 179 lbs, last visit same  GENERAL: She is ambulatory, in no acute distress.    HEART: Regular rate and rhythm.  s1 and s2 audible  CHEST: Clear to auscultation.  no rales no wheezes  ABDOMEN: Soft, nontender.    EXTREMITIES: Showed no edema.       Labs: reviewed, pending today, last visit:  [facility]        Lab Results   Component Value Date       05/26/2017     K 4.3 05/26/2017      05/26/2017     CO2 23 05/26/2017     BUN 14 05/26/2017     CREATININE 1.0 05/26/2017     CALCIUM 8.7 05/26/2017     ANIONGAP 8 05/26/2017     ESTGFRAFRICA >60.0 05/26/2017     EGFRNONAA >60.0 05/26/2017         Urine p/cr 810 mg, from 180 mg  ua reviewed     ASSESSMENT AND PLAN: This is a 26 year-old female with history of polycystic kidney disease who presents for followup after delivery for BP mgmt.  The impression is as follows:       1. Renal function is stable.  H/o of PCKD  Renal function has been stable, but today's labs are pending, will f/u with the labs and inform the pt  Proteinuria, worse. Will repeat and monitor  Proteinuria is concerning (may be worse because ACE-I has been on hold)  Pt may be at risk of further renal damage if she becomes pregnant again, pt and her  were advised before by me.     2. Hypertension. Blood pressure is overall much better controlled, but diastolic is still slightly high  Tolerating methyldopa and labetalol well.   Noted addition of a  Small dose of ACE-I. This small dose likely not effective for BP control  Advised that most BP meds carry some risk with lactation. Safer BP meds include methyldopa, CCB's, and some BB's.  CCB's remain an option, but pt appears slightly tachycardic  Discussed with pt.     3. Tachycardia: unsure why?  Mild.  No excessive caffeine.  Pt feels she is anxious during visits.    4. Headaches. No issues today.   MRI of the head was normal in 2007.      PLANS AND RECOMMENDATIONS:    As detailed above  Increase labetalol 100 mg to qid form tid (will also help with tachycardia)  RTC 5-6 weeks  Discussed with pt   Opportunity for questions and discussion provided.  Total time spent 40 min. Including reviewing other providers' notes.  More than 50% spent on counseling and coordination of care.  Level 5 visit.     Mee Rabago M.D.

## 2017-08-14 ENCOUNTER — PATIENT MESSAGE (OUTPATIENT)
Dept: NEPHROLOGY | Facility: CLINIC | Age: 27
End: 2017-08-14

## 2017-08-30 ENCOUNTER — LAB VISIT (OUTPATIENT)
Dept: LAB | Facility: HOSPITAL | Age: 27
End: 2017-08-30
Attending: INTERNAL MEDICINE
Payer: COMMERCIAL

## 2017-08-30 DIAGNOSIS — I10 ESSENTIAL HYPERTENSION: ICD-10-CM

## 2017-08-30 LAB
ANION GAP SERPL CALC-SCNC: 9 MMOL/L
BUN SERPL-MCNC: 16 MG/DL
CALCIUM SERPL-MCNC: 9.2 MG/DL
CHLORIDE SERPL-SCNC: 107 MMOL/L
CO2 SERPL-SCNC: 23 MMOL/L
CREAT SERPL-MCNC: 1.3 MG/DL
EST. GFR  (AFRICAN AMERICAN): >60 ML/MIN/1.73 M^2
EST. GFR  (NON AFRICAN AMERICAN): 56.4 ML/MIN/1.73 M^2
GLUCOSE SERPL-MCNC: 88 MG/DL
POTASSIUM SERPL-SCNC: 4.1 MMOL/L
SODIUM SERPL-SCNC: 139 MMOL/L

## 2017-08-30 PROCEDURE — 80048 BASIC METABOLIC PNL TOTAL CA: CPT

## 2017-08-30 PROCEDURE — 36415 COLL VENOUS BLD VENIPUNCTURE: CPT | Mod: PO

## 2017-10-30 ENCOUNTER — PATIENT MESSAGE (OUTPATIENT)
Dept: NEPHROLOGY | Facility: CLINIC | Age: 27
End: 2017-10-30

## 2017-10-30 RX ORDER — ENALAPRIL MALEATE 20 MG/1
TABLET ORAL
Qty: 60 TABLET | Refills: 0 | Status: SHIPPED | OUTPATIENT
Start: 2017-10-30 | End: 2017-10-31 | Stop reason: SDUPTHER

## 2017-10-31 ENCOUNTER — OFFICE VISIT (OUTPATIENT)
Dept: NEPHROLOGY | Facility: CLINIC | Age: 27
End: 2017-10-31
Payer: COMMERCIAL

## 2017-10-31 VITALS
WEIGHT: 180.31 LBS | SYSTOLIC BLOOD PRESSURE: 126 MMHG | HEIGHT: 67 IN | HEART RATE: 104 BPM | DIASTOLIC BLOOD PRESSURE: 84 MMHG | BODY MASS INDEX: 28.3 KG/M2

## 2017-10-31 DIAGNOSIS — Q61.3 POLYCYSTIC KIDNEY DISEASE: ICD-10-CM

## 2017-10-31 DIAGNOSIS — I10 HYPERTENSION, UNSPECIFIED TYPE: Primary | ICD-10-CM

## 2017-10-31 PROCEDURE — 99214 OFFICE O/P EST MOD 30 MIN: CPT | Mod: S$GLB,,, | Performed by: INTERNAL MEDICINE

## 2017-10-31 PROCEDURE — 99999 PR PBB SHADOW E&M-EST. PATIENT-LVL III: CPT | Mod: PBBFAC,,, | Performed by: INTERNAL MEDICINE

## 2017-10-31 RX ORDER — ENALAPRIL MALEATE 20 MG/1
10 TABLET ORAL 2 TIMES DAILY
Qty: 60 TABLET | Refills: 5 | Status: SHIPPED | OUTPATIENT
Start: 2017-10-31 | End: 2017-12-26 | Stop reason: SDUPTHER

## 2017-10-31 NOTE — PROGRESS NOTES
PROGRESS NOTE FOR ESTABLISHED PATIENT    REASON FOR VISIT: Polycystic kidney disease    27 y.o. female with history of PCKD, HTN, migraines, proteinuria presents to the renal clinic for evaluation of renal insufficiency.     Patient has no complaints today.         Past Medical History:   Diagnosis Date    Anemia     Hypertension     renal hypertension    Polycystic kidney disease        Past Surgical History:   Procedure Laterality Date     SECTION      eye cyst      HERNIA REPAIR      UMBILICAL HERNIA REPAIR         Review of patient's allergies indicates:  No Known Allergies    Current Outpatient Prescriptions   Medication Sig Dispense Refill    butalbital-acetaminophen-caffeine -40 mg (FIORICET) -40 mg per tablet Take 1-2 tablets by mouth every 6 (six) hours as needed for Headaches. 30 tablet 1    enalapril (VASOTEC) 20 MG tablet Take 0.5 tablets (10 mg total) by mouth 2 (two) times daily. 60 tablet 5    hydrOXYzine pamoate (VISTARIL) 25 MG Cap Take 1 capsule (25 mg total) by mouth every 6 (six) hours as needed (anxiety). 30 capsule 1    labetalol (NORMODYNE) 100 MG tablet Take 1 tablet (100 mg total) by mouth 4 (four) times daily. 120 tablet 11     No current facility-administered medications for this visit.        Family History   Problem Relation Age of Onset    Polycystic kidney disease Mother     Hypertension Mother     Cancer Mother      thyroid cancer    Polycystic kidney disease Maternal Uncle     Hypertension Maternal Uncle     Polycystic kidney disease Maternal Grandfather     Hypertension Maternal Grandfather     Cancer Paternal Grandmother      breast cancer    Hypertension Paternal Grandmother     Heart failure Paternal Grandmother     Heart attack Paternal Grandfather     Hypertension Paternal Grandfather     No Known Problems Father        Social History     Social History    Marital status:      Spouse name: N/A    Number of children: N/A     "Years of education: N/A     Occupational History     [employer]      Social History Main Topics    Smoking status: Never Smoker    Smokeless tobacco: Never Used    Alcohol use 0.0 oz/week      Comment: socially,past     Drug use: No    Sexual activity: Yes     Partners: Male     Birth control/ protection: None     Other Topics Concern    Not on file     Social History Narrative    No narrative on file       Review of Systems:  1. GENERAL: patient denies any fever, weight changes, generalized weakness, dizziness.  2. HEENT: patient denies headaches, visual disturbances, swallowing problems, sinus pain, nasal congestion.  3. CARDIOVASCULAR: patient denies chest pain, palpitations.  4. PULMONARY: patient denies SOB, coughing, hemoptysis, wheezing.  5. GASTROINTESTINAL: patient denies abdominal pain, nausea, vomiting, diarrhea.  6. GENITOURINARY: patient denies dysuria, hematuria, hesitancy, frequency.  7. EXTREMITIES: patient denies LE edema, LE cramping.  8. DERMATOLOGY: patient denies rashes, ulcers.  9. NEURO: patient denies tremors, extremity weakness, extremity numbness/tingling.  10. MUSCULOSKELETAL: patient denies joint pain, joint swelling.  11. HEMATOLOGY: patient denies rectal or gum bleeding.  12: PSYCH: patient denies anxiety, depression.      PHYSICAL EXAM:    /84   Pulse 104   Ht 5' 7" (1.702 m)   Wt 81.8 kg (180 lb 5.4 oz)   BMI 28.24 kg/m²     GENERAL: Pleasant lady/gentleman presents to clinic with non-labored breathing.  HEENT: PER, no nasal discharge, no icterus, no oral exudates, moist mucosal membranes.  NECK: no thyroid mass, no lymphadenopathy.  HEART: RRR S1/S2, no rubs, good peripheral pulses.  LUNGS: CTA bilaterally, no wheezing, breathing is nonlabored.  ABDOMEN: soft, nontender, not distended, bowel sounds are present, no abdominal hernia.  EXTREM: no LE edema.  SKIN: no rashes, skin is warm and dry.  NEURO: A & O x 3, no obvious focal signs.    LABORATORY RESULTS:    Lab " Results   Component Value Date    CREATININE 1.3 08/30/2017    BUN 16 08/30/2017     08/30/2017    K 4.1 08/30/2017     08/30/2017    CO2 23 08/30/2017      Lab Results   Component Value Date    CALCIUM 9.2 08/30/2017    PHOS 4.4 06/29/2017     Lab Results   Component Value Date    ALBUMIN 3.8 06/29/2017     Lab Results   Component Value Date    WBC 11.61 05/18/2017    HGB 11.5 (L) 05/18/2017    HCT 36.2 (L) 05/18/2017    MCV 93 05/18/2017     05/18/2017     Protein Creatinine Ratios:    Creatinine, Random Ur   Date Value Ref Range Status   08/30/2017 22.0 15.0 - 325.0 mg/dL Final     Comment:     The random urine reference ranges provided were established   for 24 hour urine collections.  No reference ranges exist for  random urine specimens.  Correlate clinically.     05/18/2017 85.0 15.0 - 325.0 mg/dL Final     Comment:     The random urine reference ranges provided were established   for 24 hour urine collections.  No reference ranges exist for  random urine specimens.  Correlate clinically.     05/02/2017 44.1 15.0 - 325.0 mg/dL Final     Comment:     The random urine reference ranges provided were established   for 24 hour urine collections.  No reference ranges exist for  random urine specimens.  Correlate clinically.       Protein, Urine Random   Date Value Ref Range Status   08/30/2017 <7 0 - 15 mg/dL Final     Comment:     The random urine reference ranges provided were established   for 24 hour urine collections.  No reference ranges exist for  random urine specimens.  Correlate clinically.     05/18/2017 69 (H) 0 - 15 mg/dL Final     Comment:     The random urine reference ranges provided were established   for 24 hour urine collections.  No reference ranges exist for  random urine specimens.  Correlate clinically.     05/02/2017 20 (H) 0 - 15 mg/dL Final     Comment:     The random urine reference ranges provided were established   for 24 hour urine collections.  No reference ranges  exist for  random urine specimens.  Correlate clinically.       Prot/Creat Ratio, Ur   Date Value Ref Range Status   08/30/2017 Unable to calculate 0.00 - 0.20 Final   05/18/2017 0.81 (H) 0.00 - 0.20 Final   05/02/2017 0.45 (H) 0.00 - 0.20 Final           ASSESSMENT AND PLAN:  27 y.o. year old female with history of PCKD, HTN, migraines, proteinuria presents to the renal clinic for evaluation of renal insufficiency.    1. PCKD: Patient's renal function has slightly worsened with creatinine at 1.3. This is likely related to poor hydration and patient was advised to hydrate with 60-65 ounces of water per day. Proteinuria has resolved. Patient's renal function will be monitored closely and she will return to the clinic in 3 months for follow up. Patient was advised to avoid NSAID pain medications such as advil, aleve, motrin, ibuprofen, naprosyn, meloxicam, diclofenac, mobic.     2. Electrolytes: Within normal limits.    3. Acid base status: No acute issues.    4. Volume: Euvolemic.     5. Hypertension: Good BP control.     6. Medications: Reviewed. Will stop Methyldopa and restart Enalapril at 10 mg bid.

## 2017-10-31 NOTE — PATIENT INSTRUCTIONS
Hydrate with 60-65 ounces of water per day.     Avoid NSAID pain medications such as advil, aleve, motrin, ibuprofen, naprosyn, meloxicam, diclofenac, mobic.

## 2017-12-26 ENCOUNTER — PATIENT MESSAGE (OUTPATIENT)
Dept: NEPHROLOGY | Facility: CLINIC | Age: 27
End: 2017-12-26

## 2017-12-26 DIAGNOSIS — I10 HYPERTENSION, UNSPECIFIED TYPE: ICD-10-CM

## 2017-12-26 RX ORDER — ENALAPRIL MALEATE 20 MG/1
10 TABLET ORAL 2 TIMES DAILY
Qty: 60 TABLET | Refills: 5 | Status: SHIPPED | OUTPATIENT
Start: 2017-12-26 | End: 2019-01-29 | Stop reason: SDUPTHER

## 2018-01-25 ENCOUNTER — LAB VISIT (OUTPATIENT)
Dept: LAB | Facility: HOSPITAL | Age: 28
End: 2018-01-25
Attending: INTERNAL MEDICINE
Payer: COMMERCIAL

## 2018-01-25 DIAGNOSIS — Q61.3 POLYCYSTIC KIDNEY DISEASE: ICD-10-CM

## 2018-01-25 LAB
ALBUMIN SERPL BCP-MCNC: 3.9 G/DL
ANION GAP SERPL CALC-SCNC: 8 MMOL/L
BUN SERPL-MCNC: 17 MG/DL
CALCIUM SERPL-MCNC: 9.3 MG/DL
CHLORIDE SERPL-SCNC: 108 MMOL/L
CO2 SERPL-SCNC: 22 MMOL/L
CREAT SERPL-MCNC: 1.3 MG/DL
EST. GFR  (AFRICAN AMERICAN): >60 ML/MIN/1.73 M^2
EST. GFR  (NON AFRICAN AMERICAN): 56 ML/MIN/1.73 M^2
GLUCOSE SERPL-MCNC: 85 MG/DL
PHOSPHATE SERPL-MCNC: 3.2 MG/DL
POTASSIUM SERPL-SCNC: 4.1 MMOL/L
SODIUM SERPL-SCNC: 138 MMOL/L

## 2018-01-25 PROCEDURE — 80069 RENAL FUNCTION PANEL: CPT

## 2018-01-25 PROCEDURE — 36415 COLL VENOUS BLD VENIPUNCTURE: CPT | Mod: PO

## 2018-02-08 ENCOUNTER — OFFICE VISIT (OUTPATIENT)
Dept: INTERNAL MEDICINE | Facility: CLINIC | Age: 28
End: 2018-02-08
Payer: COMMERCIAL

## 2018-02-08 VITALS
SYSTOLIC BLOOD PRESSURE: 118 MMHG | HEIGHT: 67 IN | DIASTOLIC BLOOD PRESSURE: 88 MMHG | TEMPERATURE: 99 F | BODY MASS INDEX: 26.85 KG/M2 | WEIGHT: 171.06 LBS

## 2018-02-08 DIAGNOSIS — F41.9 ANXIETY: Primary | ICD-10-CM

## 2018-02-08 PROCEDURE — 99999 PR PBB SHADOW E&M-EST. PATIENT-LVL III: CPT | Mod: PBBFAC,,, | Performed by: FAMILY MEDICINE

## 2018-02-08 PROCEDURE — 99213 OFFICE O/P EST LOW 20 MIN: CPT | Mod: S$GLB,,, | Performed by: FAMILY MEDICINE

## 2018-02-08 PROCEDURE — 99213 OFFICE O/P EST LOW 20 MIN: CPT | Mod: PO | Performed by: FAMILY MEDICINE

## 2018-02-08 PROCEDURE — 3008F BODY MASS INDEX DOCD: CPT | Mod: S$GLB,,, | Performed by: FAMILY MEDICINE

## 2018-02-08 RX ORDER — SERTRALINE HYDROCHLORIDE 50 MG/1
50 TABLET, FILM COATED ORAL DAILY
Qty: 30 TABLET | Refills: 3 | Status: SHIPPED | OUTPATIENT
Start: 2018-02-08 | End: 2018-04-13 | Stop reason: SDUPTHER

## 2018-02-08 NOTE — PROGRESS NOTES
Subjective:      Patient ID: Nathalie Roland is a 27 y.o. female.    Chief Complaint: Establish Care and Anxiety    HPI  28 yo female here today with her  to establish care.  She has PKD/HTN.  Followed by Nephro.  Had her first baby, her kidney function worsened some with pregnancy.  Taking her meds daily and manges her disease well.  She suffers with anxiety.  Her  has noticed it for awhile now.  She has a hard time sometimes expressing herself, so she brought a check list.  She reports palpitations, bodyaches, nervous energy, pressure in head/face/neck, sweating, difficulty breathing, brain fog sleep troubles, repetitive thoughts, spaced out feeling, fears overall things that aren't reasonable.  Has 9 mos old baby girl.   Is nursing.  Never been on meds, given vistaril after delivery.    Past Medical History:   Diagnosis Date    Anemia     Hypertension     renal hypertension    Polycystic kidney disease      Family History   Problem Relation Age of Onset    Polycystic kidney disease Mother     Hypertension Mother     Cancer Mother      thyroid cancer    Polycystic kidney disease Maternal Uncle     Hypertension Maternal Uncle     Polycystic kidney disease Maternal Grandfather     Hypertension Maternal Grandfather     Cancer Paternal Grandmother      breast cancer    Hypertension Paternal Grandmother     Heart failure Paternal Grandmother     Heart attack Paternal Grandfather     Hypertension Paternal Grandfather     No Known Problems Father      Past Surgical History:   Procedure Laterality Date     SECTION      eye cyst      HERNIA REPAIR      UMBILICAL HERNIA REPAIR       Social History   Substance Use Topics    Smoking status: Never Smoker    Smokeless tobacco: Never Used    Alcohol use 0.0 oz/week      Comment: socially,past        /88 (BP Location: Left arm, Patient Position: Sitting, BP Method: Large (Manual))   Temp 99.2 °F (37.3 °C) (Tympanic)  "  Ht 5' 7" (1.702 m)   Wt 77.6 kg (171 lb 1.2 oz)   BMI 26.79 kg/m²     Review of Systems   Constitutional: Negative for activity change and unexpected weight change.   HENT: Negative for hearing loss, rhinorrhea and trouble swallowing.    Eyes: Negative for discharge and visual disturbance.   Respiratory: Negative for chest tightness and wheezing.    Cardiovascular: Negative for chest pain and palpitations.   Gastrointestinal: Negative for blood in stool, constipation, diarrhea and vomiting.   Endocrine: Negative for polydipsia and polyuria.   Genitourinary: Negative for difficulty urinating, dysuria, hematuria and menstrual problem.   Musculoskeletal: Negative for arthralgias, joint swelling and neck pain.   Neurological: Negative for weakness and headaches.   Psychiatric/Behavioral: Positive for dysphoric mood. Negative for confusion.       Objective:     Physical Exam   Constitutional: She is oriented to person, place, and time. She appears well-developed and well-nourished.   Neck: Normal range of motion. Neck supple.   Cardiovascular: Normal rate, regular rhythm and normal heart sounds.    Pulmonary/Chest: Effort normal and breath sounds normal. No respiratory distress. She has no wheezes.   Neurological: She is alert and oriented to person, place, and time.   Psychiatric: She has a normal mood and affect. Her behavior is normal. Judgment and thought content normal.   Nursing note and vitals reviewed.      Lab Results   Component Value Date    WBC 11.61 05/18/2017    HGB 11.5 (L) 05/18/2017    HCT 36.2 (L) 05/18/2017     05/18/2017    CHOL 186 12/12/2013    TRIG 114 12/12/2013    HDL 52 12/12/2013    ALT 18 05/02/2017    AST 15 05/02/2017     01/25/2018    K 4.1 01/25/2018     01/25/2018    CREATININE 1.3 01/25/2018    BUN 17 01/25/2018    CO2 22 (L) 01/25/2018    TSH 3.486 05/05/2017       Assessment:     1. Anxiety         Plan:     Anxiety    Other orders  -     Cancel: Lipid panel; " Future; Expected date: 01/29/2018  -     Cancel: Comprehensive metabolic panel; Future; Expected date: 01/29/2018  -     Cancel: TSH; Future; Expected date: 01/29/2018  -     Cancel: CBC W/ AUTO DIFFERENTIAL; Future; Expected date: 01/29/2018  -     sertraline (ZOLOFT) 50 MG tablet; Take 1 tablet (50 mg total) by mouth once daily.  Dispense: 30 tablet; Refill: 3    Discussed anxiety and symptoms, pt has many and has been suffering for awhile.  Tried a counselor last year, not really helpful.  Is interested in meds  Start zoloft 50mg, cut in half first week and then full tablet nightly  F/u 4-6 wks

## 2018-03-01 ENCOUNTER — OFFICE VISIT (OUTPATIENT)
Dept: NEPHROLOGY | Facility: CLINIC | Age: 28
End: 2018-03-01
Payer: COMMERCIAL

## 2018-03-01 VITALS
HEART RATE: 76 BPM | HEIGHT: 67 IN | BODY MASS INDEX: 26.09 KG/M2 | DIASTOLIC BLOOD PRESSURE: 74 MMHG | SYSTOLIC BLOOD PRESSURE: 108 MMHG | WEIGHT: 166.25 LBS

## 2018-03-01 DIAGNOSIS — N18.30 CKD (CHRONIC KIDNEY DISEASE) STAGE 3, GFR 30-59 ML/MIN: Primary | ICD-10-CM

## 2018-03-01 DIAGNOSIS — Q61.3 POLYCYSTIC KIDNEY DISEASE: ICD-10-CM

## 2018-03-01 PROCEDURE — 99999 PR PBB SHADOW E&M-EST. PATIENT-LVL III: CPT | Mod: PBBFAC,,, | Performed by: INTERNAL MEDICINE

## 2018-03-01 PROCEDURE — 99213 OFFICE O/P EST LOW 20 MIN: CPT | Mod: S$GLB,,, | Performed by: INTERNAL MEDICINE

## 2018-03-01 NOTE — PROGRESS NOTES
PROGRESS NOTE FOR ESTABLISHED PATIENT    REASON FOR VISIT: Polycystic kidney disease    27 y.o. female with history of PCKD, HTN, migraines, proteinuria presents to the renal clinic for evaluation of renal insufficiency.     Patient denies any complaints today.         Past Medical History:   Diagnosis Date    Anemia     Hypertension     renal hypertension    Polycystic kidney disease        Past Surgical History:   Procedure Laterality Date     SECTION      eye cyst      HERNIA REPAIR      UMBILICAL HERNIA REPAIR         Review of patient's allergies indicates:  No Known Allergies    Current Outpatient Prescriptions   Medication Sig Dispense Refill    enalapril (VASOTEC) 20 MG tablet Take 0.5 tablets (10 mg total) by mouth 2 (two) times daily. 60 tablet 5    hydrOXYzine pamoate (VISTARIL) 25 MG Cap Take 1 capsule (25 mg total) by mouth every 6 (six) hours as needed (anxiety). 30 capsule 1    labetalol (NORMODYNE) 100 MG tablet Take 1 tablet (100 mg total) by mouth 4 (four) times daily. 120 tablet 11    sertraline (ZOLOFT) 50 MG tablet Take 1 tablet (50 mg total) by mouth once daily. 30 tablet 3     No current facility-administered medications for this visit.        Family History   Problem Relation Age of Onset    Polycystic kidney disease Mother     Hypertension Mother     Cancer Mother      thyroid cancer    Polycystic kidney disease Maternal Uncle     Hypertension Maternal Uncle     Polycystic kidney disease Maternal Grandfather     Hypertension Maternal Grandfather     Cancer Paternal Grandmother      breast cancer    Hypertension Paternal Grandmother     Heart failure Paternal Grandmother     Heart attack Paternal Grandfather     Hypertension Paternal Grandfather     No Known Problems Father        Social History     Social History    Marital status:      Spouse name: N/A    Number of children: 1    Years of education: N/A     Occupational History     [employer]   "    Social History Main Topics    Smoking status: Never Smoker    Smokeless tobacco: Never Used    Alcohol use 0.0 oz/week      Comment: socially,past     Drug use: No    Sexual activity: Yes     Partners: Male     Birth control/ protection: None     Other Topics Concern    Not on file     Social History Narrative    No narrative on file       Review of Systems:  1. GENERAL: patient denies any fever, weight changes, generalized weakness, dizziness.  2. HEENT: patient denies headaches, visual disturbances, swallowing problems, sinus pain, nasal congestion.  3. CARDIOVASCULAR: patient denies chest pain, palpitations.  4. PULMONARY: patient denies SOB, coughing, hemoptysis, wheezing.  5. GASTROINTESTINAL: patient denies abdominal pain, nausea, vomiting, diarrhea.  6. GENITOURINARY: patient denies dysuria, hematuria, hesitancy, frequency.  7. EXTREMITIES: patient denies LE edema, LE cramping.  8. DERMATOLOGY: patient denies rashes, ulcers.  9. NEURO: patient denies tremors, extremity weakness, extremity numbness/tingling.  10. MUSCULOSKELETAL: patient denies joint pain, joint swelling.  11. HEMATOLOGY: patient denies rectal or gum bleeding.  12: PSYCH: patient denies anxiety, depression.      PHYSICAL EXAM:    /74   Pulse 76   Ht 5' 7" (1.702 m)   Wt 75.4 kg (166 lb 3.6 oz)   BMI 26.03 kg/m²     GENERAL: Pleasant lady/gentleman presents to clinic with non-labored breathing.  HEENT: PER, no nasal discharge, no icterus, no oral exudates, moist mucosal membranes.  NECK: no thyroid mass, no lymphadenopathy.  HEART: RRR S1/S2, no rubs, good peripheral pulses.  LUNGS: CTA bilaterally, no wheezing, breathing is nonlabored.  ABDOMEN: soft, nontender, not distended, bowel sounds are present, no abdominal hernia.  EXTREM: no LE edema.  SKIN: no rashes, skin is warm and dry.  NEURO: A & O x 3, no obvious focal signs.    LABORATORY RESULTS:    Lab Results   Component Value Date    CREATININE 1.3 01/25/2018    BUN " 17 01/25/2018     01/25/2018    K 4.1 01/25/2018     01/25/2018    CO2 22 (L) 01/25/2018      Lab Results   Component Value Date    CALCIUM 9.3 01/25/2018    PHOS 3.2 01/25/2018     Lab Results   Component Value Date    ALBUMIN 3.9 01/25/2018     Lab Results   Component Value Date    WBC 11.61 05/18/2017    HGB 11.5 (L) 05/18/2017    HCT 36.2 (L) 05/18/2017    MCV 93 05/18/2017     05/18/2017     Protein Creatinine Ratios:    Creatinine, Random Ur   Date Value Ref Range Status   01/25/2018 22.0 15.0 - 325.0 mg/dL Final     Comment:     The random urine reference ranges provided were established   for 24 hour urine collections.  No reference ranges exist for  random urine specimens.  Correlate clinically.     08/30/2017 22.0 15.0 - 325.0 mg/dL Final     Comment:     The random urine reference ranges provided were established   for 24 hour urine collections.  No reference ranges exist for  random urine specimens.  Correlate clinically.     05/18/2017 85.0 15.0 - 325.0 mg/dL Final     Comment:     The random urine reference ranges provided were established   for 24 hour urine collections.  No reference ranges exist for  random urine specimens.  Correlate clinically.       Protein, Urine Random   Date Value Ref Range Status   01/25/2018 <7 0 - 15 mg/dL Final     Comment:     The random urine reference ranges provided were established   for 24 hour urine collections.  No reference ranges exist for  random urine specimens.  Correlate clinically.     08/30/2017 <7 0 - 15 mg/dL Final     Comment:     The random urine reference ranges provided were established   for 24 hour urine collections.  No reference ranges exist for  random urine specimens.  Correlate clinically.     05/18/2017 69 (H) 0 - 15 mg/dL Final     Comment:     The random urine reference ranges provided were established   for 24 hour urine collections.  No reference ranges exist for  random urine specimens.  Correlate clinically.        Prot/Creat Ratio, Ur   Date Value Ref Range Status   01/25/2018 Unable to calculate 0.00 - 0.20 Final   08/30/2017 Unable to calculate 0.00 - 0.20 Final   05/18/2017 0.81 (H) 0.00 - 0.20 Final           ASSESSMENT AND PLAN:  27 y.o. year old female with history of PCKD, HTN, migraines, proteinuria presents to the renal clinic for evaluation of renal insufficiency.    1. PCKD: Patient's renal function has stabilized with creatinine at 1.3. Patient was advised to hydrate with 60-65 ounces of water per day. Proteinuria has resolved. Patient's renal function will be monitored closely and she will return to the clinic in 6 months for follow up. Patient was advised to avoid NSAID pain medications such as advil, aleve, motrin, ibuprofen, naprosyn, meloxicam, diclofenac, mobic.     2. Electrolytes: Within normal limits.    3. Acid base status: No acute issues.    4. Volume: Euvolemic.     5. Hypertension: Good BP control.     6. Medications: Reviewed. Continue Enalapril at 10 mg bid.

## 2018-03-12 ENCOUNTER — PATIENT OUTREACH (OUTPATIENT)
Dept: ADMINISTRATIVE | Facility: HOSPITAL | Age: 28
End: 2018-03-12

## 2018-04-13 ENCOUNTER — OFFICE VISIT (OUTPATIENT)
Dept: INTERNAL MEDICINE | Facility: CLINIC | Age: 28
End: 2018-04-13
Payer: COMMERCIAL

## 2018-04-13 VITALS
BODY MASS INDEX: 24.7 KG/M2 | HEART RATE: 74 BPM | HEIGHT: 68 IN | TEMPERATURE: 99 F | SYSTOLIC BLOOD PRESSURE: 110 MMHG | DIASTOLIC BLOOD PRESSURE: 78 MMHG | WEIGHT: 162.94 LBS

## 2018-04-13 DIAGNOSIS — F41.9 ANXIETY: Primary | ICD-10-CM

## 2018-04-13 DIAGNOSIS — I10 HYPERTENSION, UNSPECIFIED TYPE: ICD-10-CM

## 2018-04-13 PROCEDURE — 99999 PR PBB SHADOW E&M-EST. PATIENT-LVL III: CPT | Mod: PBBFAC,,, | Performed by: FAMILY MEDICINE

## 2018-04-13 PROCEDURE — 99213 OFFICE O/P EST LOW 20 MIN: CPT | Mod: S$GLB,,, | Performed by: FAMILY MEDICINE

## 2018-04-13 RX ORDER — SERTRALINE HYDROCHLORIDE 50 MG/1
75 TABLET, FILM COATED ORAL DAILY
Qty: 45 TABLET | Refills: 6 | Status: SHIPPED | OUTPATIENT
Start: 2018-04-13 | End: 2018-08-27 | Stop reason: SDUPTHER

## 2018-04-13 NOTE — PROGRESS NOTES
"Subjective:      Patient ID: Nathalie Roland is a 27 y.o. female.    Chief Complaint: Follow-up (6 week )    HPI  26 yo female with PKD/HTN here for f/u on anxiety.  Doing well on zoloft 50mg, feels maybe it could be increased a little.  Her mood/anxiety is much better though and her  notices a difference as well.    Past Medical History:   Diagnosis Date    Anemia     Hypertension     renal hypertension    Polycystic kidney disease      Family History   Problem Relation Age of Onset    Polycystic kidney disease Mother     Hypertension Mother     Cancer Mother      thyroid cancer    Polycystic kidney disease Maternal Uncle     Hypertension Maternal Uncle     Polycystic kidney disease Maternal Grandfather     Hypertension Maternal Grandfather     Cancer Paternal Grandmother      breast cancer    Hypertension Paternal Grandmother     Heart failure Paternal Grandmother     Heart attack Paternal Grandfather     Hypertension Paternal Grandfather     No Known Problems Father      Past Surgical History:   Procedure Laterality Date     SECTION      eye cyst      HERNIA REPAIR      UMBILICAL HERNIA REPAIR       Social History   Substance Use Topics    Smoking status: Never Smoker    Smokeless tobacco: Never Used    Alcohol use 0.0 oz/week      Comment: socially,past        /78   Pulse 74   Temp 98.8 °F (37.1 °C) (Tympanic)   Ht 5' 7.5" (1.715 m)   Wt 73.9 kg (162 lb 14.7 oz)   BMI 25.14 kg/m²     Review of Systems   Constitutional: Negative for activity change and unexpected weight change.   HENT: Negative for hearing loss, rhinorrhea and trouble swallowing.    Eyes: Negative for discharge and visual disturbance.   Respiratory: Negative for chest tightness and wheezing.    Cardiovascular: Negative for chest pain and palpitations.   Gastrointestinal: Negative for blood in stool, constipation, diarrhea and vomiting.   Endocrine: Negative for polydipsia and polyuria. "   Genitourinary: Negative for difficulty urinating, dysuria, hematuria and menstrual problem.   Musculoskeletal: Negative for arthralgias, joint swelling and neck pain.   Neurological: Positive for headaches. Negative for weakness.   Psychiatric/Behavioral: Negative for confusion and dysphoric mood.       Objective:     Physical Exam   Constitutional: She is oriented to person, place, and time. She appears well-developed and well-nourished.   Cardiovascular: Normal rate, regular rhythm and normal heart sounds.    Pulmonary/Chest: Effort normal and breath sounds normal. No respiratory distress.   Neurological: She is alert and oriented to person, place, and time.   Psychiatric: She has a normal mood and affect. Her behavior is normal. Judgment and thought content normal.   Nursing note and vitals reviewed.      Lab Results   Component Value Date    WBC 11.61 05/18/2017    HGB 11.5 (L) 05/18/2017    HCT 36.2 (L) 05/18/2017     05/18/2017    CHOL 186 12/12/2013    TRIG 114 12/12/2013    HDL 52 12/12/2013    ALT 18 05/02/2017    AST 15 05/02/2017     01/25/2018    K 4.1 01/25/2018     01/25/2018    CREATININE 1.3 01/25/2018    BUN 17 01/25/2018    CO2 22 (L) 01/25/2018    TSH 3.486 05/05/2017       Assessment:     1. Anxiety    2. Hypertension, unspecified type         Plan:     Anxiety    Hypertension, unspecified type    Other orders  -     sertraline (ZOLOFT) 50 MG tablet; Take 1.5 tablets (75 mg total) by mouth once daily.  Dispense: 45 tablet; Refill: 6    Try zoloft to 75mg daily  Cont with healthy coping skills  F/u 3 mos

## 2018-05-10 ENCOUNTER — PATIENT MESSAGE (OUTPATIENT)
Dept: INTERNAL MEDICINE | Facility: CLINIC | Age: 28
End: 2018-05-10

## 2018-05-11 ENCOUNTER — PATIENT MESSAGE (OUTPATIENT)
Dept: NEPHROLOGY | Facility: CLINIC | Age: 28
End: 2018-05-11

## 2018-05-14 ENCOUNTER — OFFICE VISIT (OUTPATIENT)
Dept: OBSTETRICS AND GYNECOLOGY | Facility: CLINIC | Age: 28
End: 2018-05-14
Payer: COMMERCIAL

## 2018-05-14 VITALS
WEIGHT: 166.25 LBS | HEIGHT: 68 IN | SYSTOLIC BLOOD PRESSURE: 132 MMHG | BODY MASS INDEX: 25.2 KG/M2 | DIASTOLIC BLOOD PRESSURE: 78 MMHG

## 2018-05-14 DIAGNOSIS — Q61.3 POLYCYSTIC KIDNEY DISEASE: ICD-10-CM

## 2018-05-14 DIAGNOSIS — Z01.419 WELL WOMAN EXAM WITH ROUTINE GYNECOLOGICAL EXAM: Primary | ICD-10-CM

## 2018-05-14 PROCEDURE — 99999 PR PBB SHADOW E&M-EST. PATIENT-LVL III: CPT | Mod: PBBFAC,,, | Performed by: OBSTETRICS & GYNECOLOGY

## 2018-05-14 PROCEDURE — 88175 CYTOPATH C/V AUTO FLUID REDO: CPT

## 2018-05-14 PROCEDURE — 99395 PREV VISIT EST AGE 18-39: CPT | Mod: S$GLB,,, | Performed by: OBSTETRICS & GYNECOLOGY

## 2018-05-14 NOTE — PROGRESS NOTES
CHIEF COMPLAINT:   Gynecologic Exam  Chief Complaint   Patient presents with    Annual Exam    Pregnancy Questions       HISTORY OF PRESENT ILLNESS  Patient presents for annual exam. The patient has no complaints today.   Pt and  present to discuss plans for a future pregnancy and her PCKD. They would like to try for conception this fall, but will follow recs from ob gyn and nephrology first.   She is sexually active.  Contraception:  FNP condoms    Menses regular Qmonth <5days in length with moderate/normal flow.   Patient's last menstrual period was 2018.    GYN screening history: last Pap: was normal.    Health Maintenance   Topic Date Due    Influenza Vaccine  2018    Pap Smear  2019    TETANUS VACCINE  2027    Lipid Panel  Completed       HISTORY  Patient Active Problem List   Diagnosis    Hypertension, renal    Polycystic kidney disease    Migraines    Supervision of normal first pregnancy    Labor and delivery indication for care or intervention    Supervision of high risk pregnancy in third trimester    Anxiety    Failure to progress in labor    Status post  section    Tachycardia    Acute blood loss anemia       Past Medical History:   Diagnosis Date    Anemia     Anxiety     Hypertension     renal hypertension    Migraine     Polycystic kidney disease        Past Surgical History:   Procedure Laterality Date     SECTION      eye cyst      HERNIA REPAIR      UMBILICAL HERNIA REPAIR         Family History   Problem Relation Age of Onset    Polycystic kidney disease Mother     Hypertension Mother     Cancer Mother         thyroid cancer    Polycystic kidney disease Maternal Uncle     Hypertension Maternal Uncle     Polycystic kidney disease Maternal Grandfather     Hypertension Maternal Grandfather     Cancer Paternal Grandmother         breast cancer    Hypertension Paternal Grandmother     Heart failure Paternal Grandmother      Heart attack Paternal Grandfather     Hypertension Paternal Grandfather     No Known Problems Father     Polycystic kidney disease Maternal Aunt     Bipolar disorder Paternal Aunt        Social History     Social History    Marital status:      Spouse name: N/A    Number of children: 1    Years of education: N/A     Occupational History     [employer]      Social History Main Topics    Smoking status: Never Smoker    Smokeless tobacco: Never Used    Alcohol use 0.0 oz/week      Comment: socially    Drug use: No    Sexual activity: Yes     Partners: Male     Birth control/ protection: None     Other Topics Concern    None     Social History Narrative    None       Current Outpatient Prescriptions   Medication Sig Dispense Refill    enalapril (VASOTEC) 20 MG tablet Take 0.5 tablets (10 mg total) by mouth 2 (two) times daily. 60 tablet 5    labetalol (NORMODYNE) 100 MG tablet Take 1 tablet (100 mg total) by mouth 4 (four) times daily. 120 tablet 11    sertraline (ZOLOFT) 50 MG tablet Take 1.5 tablets (75 mg total) by mouth once daily. 45 tablet 6     No current facility-administered medications for this visit.        Review of patient's allergies indicates:  No Known Allergies        PHYSICAL EXAM     Vitals:    05/14/18 1420   BP: 132/78       PAIN SCALE: 0/10 None    ROS:  GENERAL: No fever, chills, fatigability or weight loss.  ABDOMEN: Appetite fine. No weight loss. Denies diarrhea, abdominal pain, hematemesis or blood in stool.  No change in bowel movement pattern.  URINARY: No flank pain, dysuria or hematuria.  REPRODUCTIVE: No abnormal vaginal bleeding.  BREASTS: Breasts symmetric, nontender and no lumps detected.    PE:   APPEARANCE: Well nourished, well developed, in no acute distress.  NECK: Neck symmetric without masses or thyromegaly.   NODES: No inguinal lymph node enlargement.  ABDOMEN: Soft. No tenderness or masses. No hepatosplenomegaly. No hernias.  BREASTS: Symmetrical, no skin  changes or visible lesions. No palpable masses, nipple discharge or adenopathy bilaterally.    PELVIC:   VULVA: No lesions. Normal female genitalia.  URETHRAL MEATUS: Normal size and location, no lesions, no prolapse.  URETHRA: No masses, tenderness, prolapse or scarring.  VAGINA: Moist and well rugated, no discharge, no significant cystocele or rectocele.  CERVIX: No lesions, normal diameter, no stenosis, no cervical motion tenderness.  UTERUS: 8 week size, regular shape, mobile, non-tender, normal position, good support.  ADNEXA: No masses, tenderness or CDS nodularity.  ANUS PERINEUM: No lesions, no relaxation, no external hemorrhoids.      DIAGNOSIS:   1. Normal gyn exam  2. Screening pap smear  3. PCKD  4. Prepregnancy counseling.      PLAN:        MEDICATIONS PRESCRIBED:   PNV     COUNSELING:  Patient was counseled today on A.C.S. Pap guidelines and recommendations for yearly pelvic exams, mammograms and monthly self breast exams; to see her PCP for other health maintenance.   Had long discussion about her PCKD and progression of disease (typically accelerates) during pregnancy. Reviewed her meds - ACEI contraindicated, pt aware. Will d/w nephrology and MFM to optimize her renal health before and during a future pregnancy.   W/r to her zoloft, she started a few mo ago due to significant anxiety in general, but now as a mother and facing a new pregnancy, she feels she needs it to help her anxiety. She feels well on it and would like to continue possibly with the next pregnancy. Understands the remote risks to the fetus.     FOLLOW-UP: With me once more prior to conception.

## 2018-06-07 ENCOUNTER — PATIENT MESSAGE (OUTPATIENT)
Dept: OBSTETRICS AND GYNECOLOGY | Facility: CLINIC | Age: 28
End: 2018-06-07

## 2018-06-07 ENCOUNTER — TELEPHONE (OUTPATIENT)
Dept: OBSTETRICS AND GYNECOLOGY | Facility: CLINIC | Age: 28
End: 2018-06-07

## 2018-06-07 DIAGNOSIS — Q61.3 POLYCYSTIC KIDNEY DISEASE: Primary | ICD-10-CM

## 2018-06-07 NOTE — TELEPHONE ENCOUNTER
pls make f/u appt for referral to [facility]  For PREpregnancy counseling.  Pt had a history of kidney transplant  Pls make it at a [facility] office    pls let pt know there is no rush, but I think this is best to do this counseling this summer.            Letter sent to Isaac King,   There are 2 pts that you and I share.   Both are thinking of preparing for a pregnancy in the near future (likely next year) and will have an appt with you in the fall. Please feel free to call me to check medications and safety in pregnancy - ie. ACEI are contraindicated.  Also I wanted to let you know I am sending them for an appt with perineataology ([facility]) for prepregnancy counseling.  Holly   [phone]    (This is one  Of those pts)

## 2018-06-07 NOTE — TELEPHONE ENCOUNTER
Attempted to contact pt to notify of appointment, left message for pt to return call to clinic. Will send my chart message

## 2018-06-16 RX ORDER — LISINOPRIL 5 MG/1
TABLET ORAL
Qty: 90 TABLET | Refills: 0 | Status: SHIPPED | OUTPATIENT
Start: 2018-06-16 | End: 2018-08-27

## 2018-06-18 ENCOUNTER — OFFICE VISIT (OUTPATIENT)
Dept: OBSTETRICS AND GYNECOLOGY | Facility: CLINIC | Age: 28
End: 2018-06-18
Payer: COMMERCIAL

## 2018-06-18 DIAGNOSIS — Z87.59 HISTORY OF POOR PREGNANCY OUTCOME: Primary | ICD-10-CM

## 2018-06-18 PROCEDURE — 99242 OFF/OP CONSLTJ NEW/EST SF 20: CPT | Mod: S$GLB,,, | Performed by: OBSTETRICS & GYNECOLOGY

## 2018-06-20 NOTE — PROGRESS NOTES
PREPREGNANCY CONSULTATION    REFERRING PHYSICIAN:  Noemi Valdez MD    REASON FOR VISIT:  Polycystic kidney disease.    CLINICAL HISTORY:  This is a 27-year-old white female  1, para 1, who is   here today to discuss future pregnancies.    PAST OBSTETRICAL HISTORY:  Significant for the delivery of a 5-pound 11 ounce   baby girl bonifacio Weathers at 37 weeks by  section after a failed induction   (28 hours of labor) for mild Pre-eclampsia.   The patient had been on Aldomet 500 mg every six hours during that pregnancy.    During the last 2 weeks of pregnancy, she was placed at   bedrest and began to develop evidence of preeclampsia.  Her P/C ratio at that   time was 0.81.    Polycystic kidney disease.  The patient is managed by Nephrology (Dr. King).    The patient's mom has polycystic kidney disease along with the maternal uncles   and maternal grandfather.  The patient is controlled at present on enalapril and  labetalol.  She is currently out of her labetalol.  Her last lab evaluation was   in 2018 and her creatinine at that time was 1.3, and this has been  stable during the past year.    The patient also reports problems with migraines, but does not take prophylactic   medication.    The patient is currently on Zoloft for depression and in retrospect believes   that she could have benefited from the Zoloft during her prior pregnancy.    PHYSICAL EXAMINATION:  VITAL SIGNS:  Blood pressure 160/110.  Weight 75 kg.  This is a well-developed,   well-nourished, well-developed, slightly overweight white female in no apparent   distress.      She reports that her blood pressures in the past have been normal and   I can see that in April, she was 110/78 and in May she was 132/78 so she   relates her hypertension at present to being out of her labetalol and being   nervous about a prepregnancy visit.    COUNSELING:  The patient was counseled that she was at increased risk for   recurrent  hypertensive complications of pregnancy, which may result in even more   premature delivery.  We also discussed the increased maternal morbidity and   mortality associated with uncontrolled hypertension during pregnancy.  We   discussed the benefits of low-dose aspirin in addition to her antihypertensive   regimen and of course, we discussed the fact that ACE inhibitors would be   contraindicated during pregnancy.  I would presume that we would continue her   labetalol when she became pregnant and add Aldomet since it seemed to work   well if needed.    We discussed the use of antidepressant therapy during pregnancy and in general   the mental health community believes that if the patient is responding to   antidepressant therapy it is wise to continue that medication during pregnancy.   The effects of untreated depression in pregnancy are not well understood. 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            /dano [id number] kris(s)        ELISEO/GIANLUCA  dd: 06/18/2018 09:23:58 (CDT)  td: 06/18/2018 22:42:31 (CDT)  Doc ID   #[id number]  Job ID #[id number]    CC:

## 2018-07-16 RX ORDER — LABETALOL 100 MG/1
TABLET, FILM COATED ORAL
Qty: 120 TABLET | Refills: 8 | Status: SHIPPED | OUTPATIENT
Start: 2018-07-16 | End: 2019-05-13 | Stop reason: SDUPTHER

## 2018-08-17 ENCOUNTER — PATIENT MESSAGE (OUTPATIENT)
Dept: NEPHROLOGY | Facility: CLINIC | Age: 28
End: 2018-08-17

## 2018-08-27 ENCOUNTER — LAB VISIT (OUTPATIENT)
Dept: LAB | Facility: HOSPITAL | Age: 28
End: 2018-08-27
Attending: FAMILY MEDICINE
Payer: COMMERCIAL

## 2018-08-27 ENCOUNTER — OFFICE VISIT (OUTPATIENT)
Dept: INTERNAL MEDICINE | Facility: CLINIC | Age: 28
End: 2018-08-27
Payer: COMMERCIAL

## 2018-08-27 VITALS
WEIGHT: 168 LBS | TEMPERATURE: 99 F | HEART RATE: 86 BPM | HEIGHT: 68 IN | SYSTOLIC BLOOD PRESSURE: 118 MMHG | BODY MASS INDEX: 25.46 KG/M2 | DIASTOLIC BLOOD PRESSURE: 86 MMHG

## 2018-08-27 DIAGNOSIS — R25.2 LEG CRAMPS: ICD-10-CM

## 2018-08-27 DIAGNOSIS — R53.83 FATIGUE, UNSPECIFIED TYPE: ICD-10-CM

## 2018-08-27 DIAGNOSIS — I10 HYPERTENSION, UNSPECIFIED TYPE: Primary | ICD-10-CM

## 2018-08-27 DIAGNOSIS — F41.9 ANXIETY: ICD-10-CM

## 2018-08-27 LAB
ANION GAP SERPL CALC-SCNC: 11 MMOL/L
BASOPHILS # BLD AUTO: 0.05 K/UL
BASOPHILS NFR BLD: 0.6 %
BUN SERPL-MCNC: 15 MG/DL
CALCIUM SERPL-MCNC: 9.3 MG/DL
CHLORIDE SERPL-SCNC: 105 MMOL/L
CO2 SERPL-SCNC: 21 MMOL/L
CREAT SERPL-MCNC: 1.2 MG/DL
DIFFERENTIAL METHOD: ABNORMAL
EOSINOPHIL # BLD AUTO: 0.3 K/UL
EOSINOPHIL NFR BLD: 3.8 %
ERYTHROCYTE [DISTWIDTH] IN BLOOD BY AUTOMATED COUNT: 12.7 %
EST. GFR  (AFRICAN AMERICAN): >60 ML/MIN/1.73 M^2
EST. GFR  (NON AFRICAN AMERICAN): >60 ML/MIN/1.73 M^2
FERRITIN SERPL-MCNC: 14 NG/ML
GLUCOSE SERPL-MCNC: 86 MG/DL
HCT VFR BLD AUTO: 37.6 %
HGB BLD-MCNC: 12 G/DL
IMM GRANULOCYTES # BLD AUTO: 0.01 K/UL
IMM GRANULOCYTES NFR BLD AUTO: 0.1 %
IRON SERPL-MCNC: 42 UG/DL
LYMPHOCYTES # BLD AUTO: 2.9 K/UL
LYMPHOCYTES NFR BLD: 37.1 %
MAGNESIUM SERPL-MCNC: 2.1 MG/DL
MCH RBC QN AUTO: 29.4 PG
MCHC RBC AUTO-ENTMCNC: 31.9 G/DL
MCV RBC AUTO: 92 FL
MONOCYTES # BLD AUTO: 0.6 K/UL
MONOCYTES NFR BLD: 7.4 %
NEUTROPHILS # BLD AUTO: 4 K/UL
NEUTROPHILS NFR BLD: 51 %
NRBC BLD-RTO: 0 /100 WBC
PLATELET # BLD AUTO: 267 K/UL
PMV BLD AUTO: 11.4 FL
POTASSIUM SERPL-SCNC: 4.4 MMOL/L
RBC # BLD AUTO: 4.08 M/UL
SATURATED IRON: 10 %
SODIUM SERPL-SCNC: 137 MMOL/L
TOTAL IRON BINDING CAPACITY: 432 UG/DL
TRANSFERRIN SERPL-MCNC: 292 MG/DL
TSH SERPL DL<=0.005 MIU/L-ACNC: 1.72 UIU/ML
VIT B12 SERPL-MCNC: 547 PG/ML
WBC # BLD AUTO: 7.8 K/UL

## 2018-08-27 PROCEDURE — 36415 COLL VENOUS BLD VENIPUNCTURE: CPT | Mod: PO

## 2018-08-27 PROCEDURE — 99999 PR PBB SHADOW E&M-EST. PATIENT-LVL III: CPT | Mod: PBBFAC,,, | Performed by: FAMILY MEDICINE

## 2018-08-27 PROCEDURE — 83735 ASSAY OF MAGNESIUM: CPT

## 2018-08-27 PROCEDURE — 99214 OFFICE O/P EST MOD 30 MIN: CPT | Mod: S$GLB,,, | Performed by: FAMILY MEDICINE

## 2018-08-27 PROCEDURE — 82728 ASSAY OF FERRITIN: CPT

## 2018-08-27 PROCEDURE — 83540 ASSAY OF IRON: CPT

## 2018-08-27 PROCEDURE — 85025 COMPLETE CBC W/AUTO DIFF WBC: CPT

## 2018-08-27 PROCEDURE — 84443 ASSAY THYROID STIM HORMONE: CPT

## 2018-08-27 PROCEDURE — 80048 BASIC METABOLIC PNL TOTAL CA: CPT

## 2018-08-27 PROCEDURE — 3008F BODY MASS INDEX DOCD: CPT | Mod: CPTII,S$GLB,, | Performed by: FAMILY MEDICINE

## 2018-08-27 PROCEDURE — 82607 VITAMIN B-12: CPT

## 2018-08-27 RX ORDER — SERTRALINE HYDROCHLORIDE 100 MG/1
100 TABLET, FILM COATED ORAL DAILY
Qty: 30 TABLET | Refills: 6 | Status: SHIPPED | OUTPATIENT
Start: 2018-08-27 | End: 2019-04-04 | Stop reason: SDUPTHER

## 2018-08-28 ENCOUNTER — PATIENT MESSAGE (OUTPATIENT)
Dept: INTERNAL MEDICINE | Facility: CLINIC | Age: 28
End: 2018-08-28

## 2018-08-28 RX ORDER — IRON,CARBONYL/ASCORBIC ACID 100-250 MG
1 TABLET ORAL DAILY
Qty: 30 TABLET | Refills: 3 | Status: SHIPPED | OUTPATIENT
Start: 2018-08-28 | End: 2019-01-02 | Stop reason: SDUPTHER

## 2018-08-28 NOTE — PROGRESS NOTES
"Subjective:      Patient ID: Nathalie Roland is a 27 y.o. female.    Chief Complaint: Anxiety    HPI  28 yo female with polycystic kidney disease/Nephro here for f/u on BP/anxiety.  Having increased leg pains/feels need to keep them moving for past few mos.  Does have hx of anemia.  No major changes.  Staying well hydrated.  Some increased fatigue overall.  Feels her anxiety is up/down.  Open to med increase.  Did counseling in past and just didn't have connection with the counselor.  Is open to try someone different.  Cycles are getting a bit heavier/last about 6 days.    Past Medical History:   Diagnosis Date    Anemia     Anxiety     Hypertension     renal hypertension    Migraine     Polycystic kidney disease      Family History   Problem Relation Age of Onset    Polycystic kidney disease Mother     Hypertension Mother     Cancer Mother         thyroid cancer    Polycystic kidney disease Maternal Uncle     Hypertension Maternal Uncle     Polycystic kidney disease Maternal Grandfather     Hypertension Maternal Grandfather     Cancer Paternal Grandmother         breast cancer    Hypertension Paternal Grandmother     Heart failure Paternal Grandmother     Heart attack Paternal Grandfather     Hypertension Paternal Grandfather     No Known Problems Father     Polycystic kidney disease Maternal Aunt     Bipolar disorder Paternal Aunt      Past Surgical History:   Procedure Laterality Date     SECTION      eye cyst      HERNIA REPAIR      UMBILICAL HERNIA REPAIR       Social History     Tobacco Use    Smoking status: Never Smoker    Smokeless tobacco: Never Used   Substance Use Topics    Alcohol use: Yes     Alcohol/week: 0.0 oz     Comment: socially    Drug use: No       /86 (BP Location: Left arm, Patient Position: Sitting, BP Method: Medium (Manual))   Pulse 86   Temp 98.5 °F (36.9 °C) (Tympanic)   Ht 5' 7.5" (1.715 m)   Wt 76.2 kg (167 lb 15.9 oz)   BMI " 25.92 kg/m²     Review of Systems   Constitutional: Positive for activity change and fatigue.   HENT:        Bottom lower lip with raised/red/non tender lesion   Musculoskeletal: Positive for myalgias.   Psychiatric/Behavioral: The patient is nervous/anxious.        Objective:     Physical Exam   Constitutional: She is oriented to person, place, and time. She appears well-developed and well-nourished.   HENT:   Mouth/Throat: Oropharynx is clear and moist.   Bottom lip with what feels like a mucocele, but erythematous in color.  Non-tender.   Cardiovascular: Normal rate, regular rhythm, normal heart sounds and intact distal pulses.   Pulmonary/Chest: Effort normal and breath sounds normal. No stridor. No respiratory distress.   Neurological: She is alert and oriented to person, place, and time.   Skin: Skin is warm and dry.   Psychiatric: She has a normal mood and affect. Her behavior is normal. Judgment and thought content normal.   Nursing note and vitals reviewed.      Lab Results   Component Value Date    WBC 7.80 08/27/2018    HGB 12.0 08/27/2018    HCT 37.6 08/27/2018     08/27/2018    CHOL 186 12/12/2013    TRIG 114 12/12/2013    HDL 52 12/12/2013    ALT 18 05/02/2017    AST 15 05/02/2017     08/27/2018    K 4.4 08/27/2018     08/27/2018    CREATININE 1.2 08/27/2018    BUN 15 08/27/2018    CO2 21 (L) 08/27/2018    TSH 1.716 08/27/2018       Assessment:     1. Hypertension, unspecified type    2. Anxiety    3. Leg cramps    4. Fatigue, unspecified type         Plan:     Hypertension, unspecified type    Anxiety    Leg cramps  -     CBC auto differential; Future; Expected date: 08/27/2018  -     Ferritin; Future; Expected date: 08/27/2018  -     Iron and TIBC; Future; Expected date: 08/27/2018  -     Vitamin B12; Future; Expected date: 08/27/2018  -     Basic metabolic panel; Future; Expected date: 08/27/2018  -     Magnesium; Future; Expected date: 08/27/2018    Fatigue, unspecified type  -      CBC auto differential; Future; Expected date: 08/27/2018  -     Ferritin; Future; Expected date: 08/27/2018  -     Iron and TIBC; Future; Expected date: 08/27/2018  -     Vitamin B12; Future; Expected date: 08/27/2018  -     Basic metabolic panel; Future; Expected date: 08/27/2018  -     Magnesium; Future; Expected date: 08/27/2018  -     TSH; Future; Expected date: 08/27/2018    Other orders  -     sertraline (ZOLOFT) 100 MG tablet; Take 1 tablet (100 mg total) by mouth once daily.  Dispense: 30 tablet; Refill: 6    Increase zoloft to 100mg daily.  Consider counseling if no improvement.  Pt can let me know  BP stable/controlled  Check iron levels/suspect anemia/iron def causing fatigue/leg pain  Monitor lip/can ice and see if goes down.  If changes/painful consider seeing oral surgeon.  Follow-up in about 3 months (around 11/27/2018), or if symptoms worsen or fail to improve.

## 2018-09-15 RX ORDER — LISINOPRIL 5 MG/1
TABLET ORAL
Qty: 90 TABLET | Refills: 0 | Status: SHIPPED | OUTPATIENT
Start: 2018-09-15 | End: 2018-12-23 | Stop reason: SDUPTHER

## 2018-09-21 ENCOUNTER — LAB VISIT (OUTPATIENT)
Dept: LAB | Facility: HOSPITAL | Age: 28
End: 2018-09-21
Attending: INTERNAL MEDICINE
Payer: COMMERCIAL

## 2018-09-21 DIAGNOSIS — Q61.3 POLYCYSTIC KIDNEY DISEASE: ICD-10-CM

## 2018-09-21 DIAGNOSIS — N18.30 CKD (CHRONIC KIDNEY DISEASE) STAGE 3, GFR 30-59 ML/MIN: ICD-10-CM

## 2018-09-21 LAB
ALBUMIN SERPL BCP-MCNC: 4 G/DL
ANION GAP SERPL CALC-SCNC: 9 MMOL/L
BUN SERPL-MCNC: 19 MG/DL
CALCIUM SERPL-MCNC: 9.7 MG/DL
CHLORIDE SERPL-SCNC: 105 MMOL/L
CO2 SERPL-SCNC: 21 MMOL/L
CREAT SERPL-MCNC: 1.1 MG/DL
EST. GFR  (AFRICAN AMERICAN): >60 ML/MIN/1.73 M^2
EST. GFR  (NON AFRICAN AMERICAN): >60 ML/MIN/1.73 M^2
GLUCOSE SERPL-MCNC: 92 MG/DL
PHOSPHATE SERPL-MCNC: 4.1 MG/DL
POTASSIUM SERPL-SCNC: 4 MMOL/L
SODIUM SERPL-SCNC: 135 MMOL/L

## 2018-09-21 PROCEDURE — 80069 RENAL FUNCTION PANEL: CPT

## 2018-09-21 PROCEDURE — 36415 COLL VENOUS BLD VENIPUNCTURE: CPT | Mod: PO

## 2018-09-28 ENCOUNTER — OFFICE VISIT (OUTPATIENT)
Dept: NEPHROLOGY | Facility: CLINIC | Age: 28
End: 2018-09-28
Payer: COMMERCIAL

## 2018-09-28 VITALS
DIASTOLIC BLOOD PRESSURE: 86 MMHG | BODY MASS INDEX: 25.96 KG/M2 | SYSTOLIC BLOOD PRESSURE: 126 MMHG | HEIGHT: 67 IN | WEIGHT: 165.38 LBS | HEART RATE: 102 BPM

## 2018-09-28 DIAGNOSIS — Q61.3 POLYCYSTIC KIDNEY DISEASE: Primary | ICD-10-CM

## 2018-09-28 PROCEDURE — 3008F BODY MASS INDEX DOCD: CPT | Mod: CPTII,S$GLB,, | Performed by: INTERNAL MEDICINE

## 2018-09-28 PROCEDURE — 99213 OFFICE O/P EST LOW 20 MIN: CPT | Mod: S$GLB,,, | Performed by: INTERNAL MEDICINE

## 2018-09-28 PROCEDURE — 99999 PR PBB SHADOW E&M-EST. PATIENT-LVL III: CPT | Mod: PBBFAC,,, | Performed by: INTERNAL MEDICINE

## 2018-09-28 NOTE — PROGRESS NOTES
PROGRESS NOTE FOR ESTABLISHED PATIENT    REASON FOR VISIT: Polycystic kidney disease    28 y.o. female with history of PCKD, HTN, migraines, proteinuria presents to the renal clinic for evaluation of renal insufficiency.     Patient denies any complaints today.         Past Medical History:   Diagnosis Date    Anemia     Anxiety     Hypertension     renal hypertension    Migraine     Polycystic kidney disease        Past Surgical History:   Procedure Laterality Date     SECTION      DELIVERY- SECTION N/A 5/3/2017    Performed by [facility]calf MD at [facility] L&D    eye cyst      HERNIA REPAIR      UMBILICAL HERNIA REPAIR         Review of patient's allergies indicates:  No Known Allergies    Current Outpatient Medications   Medication Sig Dispense Refill    enalapril (VASOTEC) 20 MG tablet Take 0.5 tablets (10 mg total) by mouth 2 (two) times daily. 60 tablet 5    iron-vitamin C 100-250 mg, ICAR-C, (ICAR-C) 100-250 mg Tab Take 1 tablet by mouth once daily. 30 tablet 3    labetalol (NORMODYNE) 100 MG tablet TAKE 1 TABLET BY MOUTH FOUR TIMES DAILY 120 tablet 8    lisinopril (PRINIVIL,ZESTRIL) 5 MG tablet TAKE 1 TABLET BY MOUTH EVERY DAY 90 tablet 0    sertraline (ZOLOFT) 100 MG tablet Take 1 tablet (100 mg total) by mouth once daily. 30 tablet 6     No current facility-administered medications for this visit.        Family History   Problem Relation Age of Onset    Polycystic kidney disease Mother     Hypertension Mother     Cancer Mother         thyroid cancer    Polycystic kidney disease Maternal Uncle     Hypertension Maternal Uncle     Polycystic kidney disease Maternal Grandfather     Hypertension Maternal Grandfather     Cancer Paternal Grandmother         breast cancer    Hypertension Paternal Grandmother     Heart failure Paternal Grandmother     Heart attack Paternal Grandfather     Hypertension Paternal Grandfather     No Known Problems Father     Polycystic kidney  "disease Maternal Aunt     Bipolar disorder Paternal Aunt        Social History     Socioeconomic History    Marital status:      Spouse name: Not on file    Number of children: 1    Years of education: Not on file    Highest education level: Not on file   Social Needs    Financial resource strain: Not on file    Food insecurity - worry: Not on file    Food insecurity - inability: Not on file    Transportation needs - medical: Not on file    Transportation needs - non-medical: Not on file   Occupational History     Employer: [employer]    Tobacco Use    Smoking status: Never Smoker    Smokeless tobacco: Never Used   Substance and Sexual Activity    Alcohol use: Yes     Alcohol/week: 0.0 oz     Comment: socially    Drug use: No    Sexual activity: Yes     Partners: Male     Birth control/protection: None   Other Topics Concern    Not on file   Social History Narrative    Not on file       Review of Systems:  1. GENERAL: patient denies any fever, weight changes, generalized weakness, dizziness.  2. HEENT: patient denies headaches, visual disturbances, swallowing problems, sinus pain, nasal congestion.  3. CARDIOVASCULAR: patient denies chest pain, palpitations.  4. PULMONARY: patient denies SOB, coughing, hemoptysis, wheezing.  5. GASTROINTESTINAL: patient denies abdominal pain, nausea, vomiting, diarrhea.  6. GENITOURINARY: patient denies dysuria, hematuria, hesitancy, frequency.  7. EXTREMITIES: patient denies LE edema, LE cramping.  8. DERMATOLOGY: patient denies rashes, ulcers.  9. NEURO: patient denies tremors, extremity weakness, extremity numbness/tingling.  10. MUSCULOSKELETAL: patient denies joint pain, joint swelling.  11. HEMATOLOGY: patient denies rectal or gum bleeding.  12: PSYCH: patient denies anxiety, depression.      PHYSICAL EXAM:    /86   Pulse 102   Ht 5' 7" (1.702 m)   Wt 75 kg (165 lb 5.5 oz)   BMI 25.90 kg/m²     GENERAL: Pleasant lady/gentleman presents to clinic " with non-labored breathing.  HEENT: PER, no nasal discharge, no icterus, no oral exudates, moist mucosal membranes.  NECK: no thyroid mass, no lymphadenopathy.  HEART: RRR S1/S2, no rubs, good peripheral pulses.  LUNGS: CTA bilaterally, no wheezing, breathing is nonlabored.  ABDOMEN: soft, nontender, not distended, bowel sounds are present, no abdominal hernia.  EXTREM: no LE edema.  SKIN: no rashes, skin is warm and dry.  NEURO: A & O x 3, no obvious focal signs.    LABORATORY RESULTS:    Lab Results   Component Value Date    CREATININE 1.1 09/21/2018    BUN 19 09/21/2018     (L) 09/21/2018    K 4.0 09/21/2018     09/21/2018    CO2 21 (L) 09/21/2018      Lab Results   Component Value Date    CALCIUM 9.7 09/21/2018    PHOS 4.1 09/21/2018     Lab Results   Component Value Date    ALBUMIN 4.0 09/21/2018     Lab Results   Component Value Date    WBC 7.80 08/27/2018    HGB 12.0 08/27/2018    HCT 37.6 08/27/2018    MCV 92 08/27/2018     08/27/2018     Protein Creatinine Ratios:    Creatinine, Random Ur   Date Value Ref Range Status   01/25/2018 22.0 15.0 - 325.0 mg/dL Final     Comment:     The random urine reference ranges provided were established   for 24 hour urine collections.  No reference ranges exist for  random urine specimens.  Correlate clinically.     08/30/2017 22.0 15.0 - 325.0 mg/dL Final     Comment:     The random urine reference ranges provided were established   for 24 hour urine collections.  No reference ranges exist for  random urine specimens.  Correlate clinically.     05/18/2017 85.0 15.0 - 325.0 mg/dL Final     Comment:     The random urine reference ranges provided were established   for 24 hour urine collections.  No reference ranges exist for  random urine specimens.  Correlate clinically.       Protein, Urine Random   Date Value Ref Range Status   01/25/2018 <7 0 - 15 mg/dL Final     Comment:     The random urine reference ranges provided were established   for 24 hour  urine collections.  No reference ranges exist for  random urine specimens.  Correlate clinically.     08/30/2017 <7 0 - 15 mg/dL Final     Comment:     The random urine reference ranges provided were established   for 24 hour urine collections.  No reference ranges exist for  random urine specimens.  Correlate clinically.     05/18/2017 69 (H) 0 - 15 mg/dL Final     Comment:     The random urine reference ranges provided were established   for 24 hour urine collections.  No reference ranges exist for  random urine specimens.  Correlate clinically.       Prot/Creat Ratio, Ur   Date Value Ref Range Status   01/25/2018 Unable to calculate 0.00 - 0.20 Final   08/30/2017 Unable to calculate 0.00 - 0.20 Final   05/18/2017 0.81 (H) 0.00 - 0.20 Final           ASSESSMENT AND PLAN:  28 y.o. year old female with history of PCKD, HTN, migraines, proteinuria presents to the renal clinic for evaluation of renal insufficiency.    1. PCKD: Patient's renal function has stabilized with creatinine at 1.1. Patient was advised to hydrate with 60-80 ounces of water per day. Proteinuria has resolved. Patient's renal function will be monitored closely and she will return to the clinic in 2 months for follow up. Patient was advised to avoid NSAID pain medications such as advil, aleve, motrin, ibuprofen, naprosyn, meloxicam, diclofenac, mobic.   Patient was introduced to Jynarque/tolvaptan as a treatment for PCKD. She will decide upon return whether to start this medication.     2. Electrolytes: Within normal limits.    3. Acid base status: No acute issues.    4. Volume: Euvolemic.     5. Hypertension: Good BP control.     6. Medications: Reviewed. Continue ACE-I.

## 2018-11-15 RX ORDER — SERTRALINE HYDROCHLORIDE 50 MG/1
TABLET, FILM COATED ORAL
Qty: 45 TABLET | Refills: 6 | Status: SHIPPED | OUTPATIENT
Start: 2018-11-15 | End: 2019-02-28

## 2018-12-12 ENCOUNTER — PATIENT MESSAGE (OUTPATIENT)
Dept: INTERNAL MEDICINE | Facility: CLINIC | Age: 28
End: 2018-12-12

## 2018-12-13 RX ORDER — ALPRAZOLAM 0.25 MG/1
0.25 TABLET ORAL 2 TIMES DAILY PRN
Qty: 10 TABLET | Refills: 0 | Status: SHIPPED | OUTPATIENT
Start: 2018-12-13 | End: 2019-02-28

## 2018-12-23 RX ORDER — LISINOPRIL 5 MG/1
TABLET ORAL
Qty: 90 TABLET | Refills: 0 | Status: SHIPPED | OUTPATIENT
Start: 2018-12-23 | End: 2019-02-28

## 2019-01-02 RX ORDER — IRON,CARBONYL/ASCORBIC ACID 100-250 MG
TABLET ORAL
Qty: 30 TABLET | Refills: 6 | Status: SHIPPED | OUTPATIENT
Start: 2019-01-02 | End: 2019-08-14 | Stop reason: SDUPTHER

## 2019-01-29 DIAGNOSIS — I10 HYPERTENSION, UNSPECIFIED TYPE: ICD-10-CM

## 2019-01-29 RX ORDER — ENALAPRIL MALEATE 20 MG/1
10 TABLET ORAL 2 TIMES DAILY
Qty: 60 TABLET | Refills: 5 | Status: SHIPPED | OUTPATIENT
Start: 2019-01-29 | End: 2020-02-13 | Stop reason: SDUPTHER

## 2019-02-08 ENCOUNTER — PATIENT MESSAGE (OUTPATIENT)
Dept: NEPHROLOGY | Facility: CLINIC | Age: 29
End: 2019-02-08

## 2019-02-21 ENCOUNTER — LAB VISIT (OUTPATIENT)
Dept: LAB | Facility: HOSPITAL | Age: 29
End: 2019-02-21
Attending: INTERNAL MEDICINE
Payer: COMMERCIAL

## 2019-02-21 DIAGNOSIS — Q61.3 POLYCYSTIC KIDNEY DISEASE: ICD-10-CM

## 2019-02-21 LAB
ALBUMIN SERPL BCP-MCNC: 3.8 G/DL
ANION GAP SERPL CALC-SCNC: 8 MMOL/L
BUN SERPL-MCNC: 18 MG/DL
CALCIUM SERPL-MCNC: 9.1 MG/DL
CHLORIDE SERPL-SCNC: 105 MMOL/L
CO2 SERPL-SCNC: 24 MMOL/L
CREAT SERPL-MCNC: 1.1 MG/DL
EST. GFR  (AFRICAN AMERICAN): >60 ML/MIN/1.73 M^2
EST. GFR  (NON AFRICAN AMERICAN): >60 ML/MIN/1.73 M^2
GLUCOSE SERPL-MCNC: 92 MG/DL
PHOSPHATE SERPL-MCNC: 3.1 MG/DL
POTASSIUM SERPL-SCNC: 4.3 MMOL/L
SODIUM SERPL-SCNC: 137 MMOL/L

## 2019-02-21 PROCEDURE — 36415 COLL VENOUS BLD VENIPUNCTURE: CPT | Mod: PO

## 2019-02-21 PROCEDURE — 80069 RENAL FUNCTION PANEL: CPT

## 2019-02-28 ENCOUNTER — OFFICE VISIT (OUTPATIENT)
Dept: NEPHROLOGY | Facility: CLINIC | Age: 29
End: 2019-02-28
Payer: COMMERCIAL

## 2019-02-28 VITALS
DIASTOLIC BLOOD PRESSURE: 78 MMHG | BODY MASS INDEX: 28.93 KG/M2 | WEIGHT: 184.31 LBS | HEIGHT: 67 IN | SYSTOLIC BLOOD PRESSURE: 124 MMHG

## 2019-02-28 DIAGNOSIS — Q61.3 POLYCYSTIC KIDNEY DISEASE: Primary | ICD-10-CM

## 2019-02-28 PROCEDURE — 99214 OFFICE O/P EST MOD 30 MIN: CPT | Mod: S$GLB,,, | Performed by: INTERNAL MEDICINE

## 2019-02-28 PROCEDURE — 99999 PR PBB SHADOW E&M-EST. PATIENT-LVL III: CPT | Mod: PBBFAC,,, | Performed by: INTERNAL MEDICINE

## 2019-02-28 PROCEDURE — 3008F BODY MASS INDEX DOCD: CPT | Mod: CPTII,S$GLB,, | Performed by: INTERNAL MEDICINE

## 2019-02-28 PROCEDURE — 99999 PR PBB SHADOW E&M-EST. PATIENT-LVL III: ICD-10-PCS | Mod: PBBFAC,,, | Performed by: INTERNAL MEDICINE

## 2019-02-28 PROCEDURE — 3008F PR BODY MASS INDEX (BMI) DOCUMENTED: ICD-10-PCS | Mod: CPTII,S$GLB,, | Performed by: INTERNAL MEDICINE

## 2019-02-28 PROCEDURE — 99214 PR OFFICE/OUTPT VISIT, EST, LEVL IV, 30-39 MIN: ICD-10-PCS | Mod: S$GLB,,, | Performed by: INTERNAL MEDICINE

## 2019-02-28 NOTE — PROGRESS NOTES
PROGRESS NOTE FOR ESTABLISHED PATIENT    REASON FOR VISIT: Polycystic kidney disease    28 y.o. female with history of PCKD, HTN, migraines, proteinuria presents to the renal clinic for evaluation of renal insufficiency.     Patient denies any complaints today. She is currently not interested in staring tolvaptan since she is considering another child.         Past Medical History:   Diagnosis Date    Anemia     Anxiety     Hypertension     renal hypertension    Migraine     Polycystic kidney disease        Past Surgical History:   Procedure Laterality Date     SECTION      DELIVERY- SECTION N/A 5/3/2017    [facility]D    eye cyst      HERNIA REPAIR      UMBILICAL HERNIA REPAIR         Review of patient's allergies indicates:  No Known Allergies    Current Outpatient Medications   Medication Sig Dispense Refill    ALPRAZolam (XANAX) 0.25 MG tablet Take 1 tablet (0.25 mg total) by mouth 2 (two) times daily as needed for Anxiety. 10 tablet 0    enalapril (VASOTEC) 20 MG tablet Take 0.5 tablets (10 mg total) by mouth 2 (two) times daily. 60 tablet 5    iron-vitamin C 100-250 mg, ICAR-C, 100-250 mg Tab TAKE 1 TABLET BY MOUTH EVERY DAY 30 tablet 6    labetalol (NORMODYNE) 100 MG tablet TAKE 1 TABLET BY MOUTH FOUR TIMES DAILY 120 tablet 8    lisinopril (PRINIVIL,ZESTRIL) 5 MG tablet TAKE 1 TABLET BY MOUTH EVERY DAY 90 tablet 0    sertraline (ZOLOFT) 100 MG tablet Take 1 tablet (100 mg total) by mouth once daily. 30 tablet 6    sertraline (ZOLOFT) 50 MG tablet TAKE 1 AND 1/2 TABLETS(75 MG) BY MOUTH EVERY DAY 45 tablet 6     No current facility-administered medications for this visit.        Family History   Problem Relation Age of Onset    Polycystic kidney disease Mother     Hypertension Mother     Cancer Mother         thyroid cancer    Polycystic kidney disease Maternal Uncle     Hypertension Maternal Uncle     Polycystic kidney disease Maternal  Grandfather     Hypertension Maternal Grandfather     Cancer Paternal Grandmother         breast cancer    Hypertension Paternal Grandmother     Heart failure Paternal Grandmother     Heart attack Paternal Grandfather     Hypertension Paternal Grandfather     No Known Problems Father     Polycystic kidney disease Maternal Aunt     Bipolar disorder Paternal Aunt        Social History     Socioeconomic History    Marital status:      Spouse name: Not on file    Number of children: 1    Years of education: Not on file    Highest education level: Not on file   Social Needs    Financial resource strain: Not on file    Food insecurity - worry: Not on file    Food insecurity - inability: Not on file    Transportation needs - medical: Not on file    Transportation needs - non-medical: Not on file   Occupational History     Employer: [employer]    Tobacco Use    Smoking status: Never Smoker    Smokeless tobacco: Never Used   Substance and Sexual Activity    Alcohol use: Yes     Alcohol/week: 0.0 oz     Comment: socially    Drug use: No    Sexual activity: Yes     Partners: Male     Birth control/protection: None   Other Topics Concern    Not on file   Social History Narrative    Not on file       Review of Systems:  1. GENERAL: patient denies any fever, weight changes, generalized weakness, dizziness.  2. HEENT: patient denies headaches, visual disturbances, swallowing problems, sinus pain, nasal congestion.  3. CARDIOVASCULAR: patient denies chest pain, palpitations.  4. PULMONARY: patient denies SOB, coughing, hemoptysis, wheezing.  5. GASTROINTESTINAL: patient denies abdominal pain, nausea, vomiting, diarrhea.  6. GENITOURINARY: patient denies dysuria, hematuria, hesitancy, frequency.  7. EXTREMITIES: patient denies LE edema, LE cramping.  8. DERMATOLOGY: patient denies rashes, ulcers.  9. NEURO: patient denies tremors, extremity weakness, extremity numbness/tingling.  10. MUSCULOSKELETAL:  "patient denies joint pain, joint swelling.  11. HEMATOLOGY: patient denies rectal or gum bleeding.  12: PSYCH: patient denies anxiety, depression.      PHYSICAL EXAM:    /78   Ht 5' 7" (1.702 m)   Wt 83.6 kg (184 lb 4.9 oz)   BMI 28.87 kg/m²     GENERAL: Pleasant lady presents to clinic with non-labored breathing.  HEENT: PER, no nasal discharge, no icterus, no oral exudates, moist mucosal membranes.  NECK: no thyroid mass, no lymphadenopathy.  HEART: RRR S1/S2, no rubs, good peripheral pulses.  LUNGS: CTA bilaterally, no wheezing, breathing is nonlabored.  ABDOMEN: soft, nontender, not distended, bowel sounds are present, no abdominal hernia.  EXTREM: no LE edema.  SKIN: no rashes, skin is warm and dry.  NEURO: A & O x 3, no obvious focal signs.    LABORATORY RESULTS:    Lab Results   Component Value Date    CREATININE 1.1 02/21/2019    BUN 18 02/21/2019     02/21/2019    K 4.3 02/21/2019     02/21/2019    CO2 24 02/21/2019      Lab Results   Component Value Date    CALCIUM 9.1 02/21/2019    PHOS 3.1 02/21/2019     Lab Results   Component Value Date    ALBUMIN 3.8 02/21/2019     Lab Results   Component Value Date    WBC 7.80 08/27/2018    HGB 12.0 08/27/2018    HCT 37.6 08/27/2018    MCV 92 08/27/2018     08/27/2018     Protein Creatinine Ratios:    Creatinine, Random Ur   Date Value Ref Range Status   01/25/2018 22.0 15.0 - 325.0 mg/dL Final     Comment:     The random urine reference ranges provided were established   for 24 hour urine collections.  No reference ranges exist for  random urine specimens.  Correlate clinically.     08/30/2017 22.0 15.0 - 325.0 mg/dL Final     Comment:     The random urine reference ranges provided were established   for 24 hour urine collections.  No reference ranges exist for  random urine specimens.  Correlate clinically.     05/18/2017 85.0 15.0 - 325.0 mg/dL Final     Comment:     The random urine reference ranges provided were established   for 24 " hour urine collections.  No reference ranges exist for  random urine specimens.  Correlate clinically.       Protein, Urine Random   Date Value Ref Range Status   01/25/2018 <7 0 - 15 mg/dL Final     Comment:     The random urine reference ranges provided were established   for 24 hour urine collections.  No reference ranges exist for  random urine specimens.  Correlate clinically.     08/30/2017 <7 0 - 15 mg/dL Final     Comment:     The random urine reference ranges provided were established   for 24 hour urine collections.  No reference ranges exist for  random urine specimens.  Correlate clinically.     05/18/2017 69 (H) 0 - 15 mg/dL Final     Comment:     The random urine reference ranges provided were established   for 24 hour urine collections.  No reference ranges exist for  random urine specimens.  Correlate clinically.       Prot/Creat Ratio, Ur   Date Value Ref Range Status   01/25/2018 Unable to calculate 0.00 - 0.20 Final   08/30/2017 Unable to calculate 0.00 - 0.20 Final   05/18/2017 0.81 (H) 0.00 - 0.20 Final           ASSESSMENT AND PLAN:  28 y.o. year old female with history of PCKD, HTN, migraines, proteinuria presents to the renal clinic for evaluation of renal insufficiency.    1. PCKD: Patient's renal function has stabilized with creatinine at 1.1. Patient was advised to hydrate with 60-80 ounces of water per day. Proteinuria has resolved. Patient's renal function will be monitored closely and she will return to the clinic in 6 months for follow up. Patient was advised to avoid NSAID pain medications such as advil, aleve, motrin, ibuprofen, naprosyn, meloxicam, diclofenac, mobic.   Patient was introduced to Jynarque/tolvaptan as a treatment for PCKD but is currently not interested in staring this medication.     2. Electrolytes: Within normal limits.    3. Acid base status: No acute issues.    4. Volume: Euvolemic.     5. Hypertension: Good BP control.     6. Medications: Reviewed. Continue  ACE-I.

## 2019-03-07 ENCOUNTER — OFFICE VISIT (OUTPATIENT)
Dept: URGENT CARE | Facility: CLINIC | Age: 29
End: 2019-03-07
Payer: COMMERCIAL

## 2019-03-07 VITALS
SYSTOLIC BLOOD PRESSURE: 122 MMHG | RESPIRATION RATE: 20 BRPM | HEART RATE: 122 BPM | OXYGEN SATURATION: 100 % | TEMPERATURE: 99 F | BODY MASS INDEX: 28.18 KG/M2 | WEIGHT: 179.88 LBS | DIASTOLIC BLOOD PRESSURE: 78 MMHG

## 2019-03-07 DIAGNOSIS — R68.89 FLU-LIKE SYMPTOMS: ICD-10-CM

## 2019-03-07 DIAGNOSIS — J06.9 UPPER RESPIRATORY TRACT INFECTION, UNSPECIFIED TYPE: Primary | ICD-10-CM

## 2019-03-07 LAB
CTP QC/QA: YES
FLUAV AG NPH QL: NEGATIVE
FLUBV AG NPH QL: NEGATIVE

## 2019-03-07 PROCEDURE — 99214 OFFICE O/P EST MOD 30 MIN: CPT | Mod: S$GLB,,, | Performed by: NURSE PRACTITIONER

## 2019-03-07 PROCEDURE — 3008F BODY MASS INDEX DOCD: CPT | Mod: CPTII,S$GLB,, | Performed by: NURSE PRACTITIONER

## 2019-03-07 PROCEDURE — 99214 PR OFFICE/OUTPT VISIT, EST, LEVL IV, 30-39 MIN: ICD-10-PCS | Mod: S$GLB,,, | Performed by: NURSE PRACTITIONER

## 2019-03-07 PROCEDURE — 3008F PR BODY MASS INDEX (BMI) DOCUMENTED: ICD-10-PCS | Mod: CPTII,S$GLB,, | Performed by: NURSE PRACTITIONER

## 2019-03-07 PROCEDURE — 99999 PR PBB SHADOW E&M-EST. PATIENT-LVL III: CPT | Mod: PBBFAC,,, | Performed by: NURSE PRACTITIONER

## 2019-03-07 PROCEDURE — 87804 POCT INFLUENZA A/B: ICD-10-PCS | Mod: QW,S$GLB,, | Performed by: NURSE PRACTITIONER

## 2019-03-07 PROCEDURE — 87804 INFLUENZA ASSAY W/OPTIC: CPT | Mod: QW,S$GLB,, | Performed by: NURSE PRACTITIONER

## 2019-03-07 PROCEDURE — 99999 PR PBB SHADOW E&M-EST. PATIENT-LVL III: ICD-10-PCS | Mod: PBBFAC,,, | Performed by: NURSE PRACTITIONER

## 2019-03-07 RX ORDER — GUAIFENESIN 600 MG/1
600 TABLET, EXTENDED RELEASE ORAL 2 TIMES DAILY PRN
Qty: 14 TABLET | Refills: 0 | COMMUNITY
Start: 2019-03-07 | End: 2019-03-14

## 2019-03-07 RX ORDER — LORATADINE 10 MG/1
10 TABLET ORAL DAILY
Qty: 30 TABLET | Refills: 0 | COMMUNITY
Start: 2019-03-07 | End: 2019-09-18

## 2019-03-07 NOTE — PATIENT INSTRUCTIONS
Viral Upper Respiratory Illness (Adult)  You have a viral upper respiratory illness (URI), which is another term for the common cold. This illness is contagious during the first few days. It is spread through the air by coughing and sneezing. It may also be spread by direct contact (touching the sick person and then touching your own eyes, nose, or mouth). Frequent handwashing will decrease risk of spread. Most viral illnesses go away within 7 to 10 days with rest and simple home remedies. Sometimes the illness may last for several weeks. Antibiotics will not kill a virus, and they are generally not prescribed for this condition.    Home care  · If symptoms are severe, rest at home for the first 2 to 3 days. When you resume activity, don't let yourself get too tired.  · Avoid being exposed to cigarette smoke (yours or others).  · You may use acetaminophen or ibuprofen to control pain and fever, unless another medicine was prescribed. (Note: If you have chronic liver or kidney disease, have ever had a stomach ulcer or gastrointestinal bleeding, or are taking blood-thinning medicines, talk with your healthcare provider before using these medicines.) Aspirin should never be given to anyone under 18 years of age who is ill with a viral infection or fever. It may cause severe liver or brain damage.  · Your appetite may be poor, so a light diet is fine. Avoid dehydration by drinking 6 to 8 glasses of fluids per day (water, soft drinks, juices, tea, or soup). Extra fluids will help loosen secretions in the nose and lungs.  · Over-the-counter cold medicines will not shorten the length of time youre sick, but they may be helpful for the following symptoms: cough, sore throat, and nasal and sinus congestion. (Note: Do not use decongestants if you have high blood pressure.)  Follow-up care  Follow up with your healthcare provider, or as advised.  When to seek medical advice  Call your healthcare provider right away if any  of these occur:  · Cough with lots of colored sputum (mucus)  · Severe headache; face, neck, or ear pain  · Difficulty swallowing due to throat pain  · Fever of 100.4°F (38°C)  Call 911, or get immediate medical care  Call emergency services right away if any of these occur:  · Chest pain, shortness of breath, wheezing, or difficulty breathing  · Coughing up blood  · Inability to swallow due to throat pain  Date Last Reviewed: 9/13/2015  © 9316-9775 [employer]. [address] [place] [id number]. All rights reserved. This information is not intended as a substitute for professional medical care. Always follow your healthcare professional's instructions.

## 2019-03-07 NOTE — PROGRESS NOTES
Subjective:      Patient ID: Nathalie Roland is a 28 y.o. female.    Chief Complaint: Cough; Fever; Generalized Body Aches; Headache; and Anorexia      Influenza   This is a new problem. Episode onset: 3 days. The problem occurs intermittently. The problem has been waxing and waning. Associated symptoms include chills, coughing, fatigue, a fever (over 100 last night), headaches, myalgias and a sore throat. Pertinent negatives include no abdominal pain, anorexia, arthralgias, change in bowel habit, chest pain, congestion, diaphoresis, joint swelling, nausea, neck pain, numbness, rash, swollen glands, urinary symptoms, vertigo, visual change, vomiting or weakness. Nothing aggravates the symptoms. She has tried acetaminophen for the symptoms. The treatment provided mild relief.     Review of Systems   Constitutional: Positive for chills, fatigue and fever (over 100 last night). Negative for activity change, appetite change, diaphoresis and unexpected weight change.   HENT: Positive for sore throat. Negative for congestion, ear discharge, ear pain, postnasal drip, rhinorrhea, sinus pressure, sinus pain, sneezing, trouble swallowing and voice change.    Eyes: Negative.  Negative for pain, discharge, redness and itching.   Respiratory: Positive for cough. Negative for chest tightness, shortness of breath and wheezing.    Cardiovascular: Negative for chest pain and palpitations.   Gastrointestinal: Negative for abdominal pain, anorexia, change in bowel habit, diarrhea, nausea and vomiting.   Endocrine: Positive for cold intolerance and heat intolerance.   Genitourinary: Negative.  Negative for decreased urine volume, difficulty urinating and dysuria.   Musculoskeletal: Positive for myalgias. Negative for arthralgias, joint swelling and neck pain.   Skin: Negative for rash.   Allergic/Immunologic: Negative for environmental allergies and food allergies.   Neurological: Positive for headaches. Negative for  dizziness, vertigo, weakness, light-headedness and numbness.   Hematological: Negative for adenopathy.   Psychiatric/Behavioral: Negative for agitation.        Objective:     Vitals:    03/07/19 0820   BP: 122/78   Pulse: (!) 122   Resp: 20   Temp: 99 °F (37.2 °C)     Physical Exam   Constitutional: She is oriented to person, place, and time. Vital signs are normal. She appears well-developed and well-nourished. She is cooperative. No distress.   HENT:   Head: Normocephalic.   Right Ear: Hearing, tympanic membrane, external ear and ear canal normal.   Left Ear: Hearing, tympanic membrane, external ear and ear canal normal.   Nose: Mucosal edema and rhinorrhea present. Right sinus exhibits no maxillary sinus tenderness and no frontal sinus tenderness. Left sinus exhibits no maxillary sinus tenderness and no frontal sinus tenderness.   Mouth/Throat: Uvula is midline. No oral lesions. No uvula swelling. No oropharyngeal exudate, posterior oropharyngeal edema, posterior oropharyngeal erythema or tonsillar abscesses.   Eyes: Conjunctivae, EOM and lids are normal. Pupils are equal, round, and reactive to light. Right eye exhibits no discharge. Left eye exhibits no discharge.   Neck: Normal range of motion and full passive range of motion without pain. Neck supple.   Cardiovascular: Normal rate, regular rhythm and normal heart sounds.   Pulmonary/Chest: Effort normal and breath sounds normal. No accessory muscle usage. No tachypnea and no bradypnea. No respiratory distress.   Musculoskeletal: Normal range of motion.   Lymphadenopathy:        Head (right side): No submandibular and no tonsillar adenopathy present.        Head (left side): No submandibular and no tonsillar adenopathy present.     She has no cervical adenopathy.   Neurological: She is alert and oriented to person, place, and time.   Skin: Skin is warm, dry and intact. Capillary refill takes less than 2 seconds. No bruising, no ecchymosis, no lesion, no  petechiae and no rash noted. She is not diaphoretic. No erythema. No pallor.   Nursing note and vitals reviewed.      Assessment:     1. Upper respiratory tract infection, unspecified type    2. Flu-like symptoms        Plan:     Nathalie was seen today for cough, fever, generalized body aches, headache and anorexia.    Diagnoses and all orders for this visit:    Upper respiratory tract infection, unspecified type    Flu-like symptoms  -     POCT Influenza A/B    Other orders  -     guaiFENesin (MUCINEX) 600 mg 12 hr tablet; Take 1 tablet (600 mg total) by mouth 2 (two) times daily as needed.  -     loratadine (CLARITIN) 10 mg tablet; Take 1 tablet (10 mg total) by mouth once daily.    Flu Negative here Patient will treat for flu based off sudden onset of fever, chills, body aches, and fatigue. Patient agrees with plan  Rest  Drink plenty clear liquids  Tylenol/Ibuprofen for fever, chills, body aches  Warm salt water gargles for throat comfort  If symptoms worsen or fail to improve with treatment, see your Primary Care Provider or go to the nearest Emergency Room.    LORETA Gonzalez, FNP-C

## 2019-04-04 RX ORDER — SERTRALINE HYDROCHLORIDE 100 MG/1
TABLET, FILM COATED ORAL
Qty: 30 TABLET | Refills: 3 | Status: SHIPPED | OUTPATIENT
Start: 2019-04-04 | End: 2019-07-17 | Stop reason: SDUPTHER

## 2019-05-14 RX ORDER — LABETALOL 100 MG/1
TABLET, FILM COATED ORAL
Qty: 120 TABLET | Refills: 9 | Status: SHIPPED | OUTPATIENT
Start: 2019-05-14 | End: 2020-05-27

## 2019-07-17 RX ORDER — SERTRALINE HYDROCHLORIDE 100 MG/1
TABLET, FILM COATED ORAL
Qty: 30 TABLET | Refills: 1 | Status: SHIPPED | OUTPATIENT
Start: 2019-07-17 | End: 2019-09-18

## 2019-08-14 RX ORDER — IRON,CARBONYL/ASCORBIC ACID 100-250 MG
TABLET ORAL
Qty: 30 TABLET | Refills: 0 | Status: SHIPPED | OUTPATIENT
Start: 2019-08-14 | End: 2019-09-18 | Stop reason: SDUPTHER

## 2019-09-05 ENCOUNTER — OFFICE VISIT (OUTPATIENT)
Dept: INTERNAL MEDICINE | Facility: CLINIC | Age: 29
End: 2019-09-05
Payer: COMMERCIAL

## 2019-09-05 DIAGNOSIS — M54.2 NECK PAIN: Primary | ICD-10-CM

## 2019-09-05 DIAGNOSIS — I12.9 HYPERTENSION, RENAL: ICD-10-CM

## 2019-09-05 PROCEDURE — 99214 OFFICE O/P EST MOD 30 MIN: CPT | Mod: S$GLB,,, | Performed by: FAMILY MEDICINE

## 2019-09-05 PROCEDURE — 3008F PR BODY MASS INDEX (BMI) DOCUMENTED: ICD-10-PCS | Mod: CPTII,S$GLB,, | Performed by: FAMILY MEDICINE

## 2019-09-05 PROCEDURE — 99999 PR PBB SHADOW E&M-EST. PATIENT-LVL IV: CPT | Mod: PBBFAC,,, | Performed by: FAMILY MEDICINE

## 2019-09-05 PROCEDURE — 99999 PR PBB SHADOW E&M-EST. PATIENT-LVL IV: ICD-10-PCS | Mod: PBBFAC,,, | Performed by: FAMILY MEDICINE

## 2019-09-05 PROCEDURE — 99214 PR OFFICE/OUTPT VISIT, EST, LEVL IV, 30-39 MIN: ICD-10-PCS | Mod: S$GLB,,, | Performed by: FAMILY MEDICINE

## 2019-09-05 PROCEDURE — 3008F BODY MASS INDEX DOCD: CPT | Mod: CPTII,S$GLB,, | Performed by: FAMILY MEDICINE

## 2019-09-05 RX ORDER — METHOCARBAMOL 750 MG/1
750 TABLET, FILM COATED ORAL 3 TIMES DAILY
Qty: 90 TABLET | Refills: 1 | Status: SHIPPED | OUTPATIENT
Start: 2019-09-05 | End: 2019-09-15

## 2019-09-05 RX ORDER — PREDNISONE 5 MG/1
TABLET ORAL
Qty: 20 TABLET | Refills: 0 | Status: SHIPPED | OUTPATIENT
Start: 2019-09-05 | End: 2019-09-18

## 2019-09-05 RX ORDER — CYCLOBENZAPRINE HCL 10 MG
10 TABLET ORAL NIGHTLY
Qty: 30 TABLET | Refills: 0 | Status: SHIPPED | OUTPATIENT
Start: 2019-09-05 | End: 2019-09-15

## 2019-09-05 NOTE — PROGRESS NOTES
Subjective:       Patient ID: Nathalie Roland is a 29 y.o. female.    Chief Complaint: Spasms (neck and shoulder)    F/U    Pt is a 29 year old who is here with acute neck pain. Pt neck has been stiff with difficulties movement in the last 24 hours. Pt reports no trauma.    Review of Systems   Constitutional: Negative.    Respiratory: Negative.    Cardiovascular: Negative.    Gastrointestinal: Negative.    Genitourinary: Negative.    Musculoskeletal: Positive for neck pain and neck stiffness.   Psychiatric/Behavioral: Negative.        Objective:      Physical Exam   Constitutional: She is oriented to person, place, and time. She appears well-developed and well-nourished.   Cardiovascular: Normal rate and regular rhythm. Exam reveals no friction rub.   No murmur heard.  Pulmonary/Chest: Effort normal and breath sounds normal. She has no wheezes. She has no rales.   Musculoskeletal:        Cervical back: She exhibits pain and spasm.   Neurological: She is alert and oriented to person, place, and time.       Assessment:       1. Neck pain    2. Hypertension, renal        Plan:       Neck pain  Comments:  Methocarbamol   Flexeril  Prednisone    Hypertension, renal  Comments:  BP is stable    Other orders  -     methocarbamol (ROBAXIN) 750 MG Tab; Take 1 tablet (750 mg total) by mouth 3 (three) times daily. for 10 days  Dispense: 90 tablet; Refill: 1  -     cyclobenzaprine (FLEXERIL) 10 MG tablet; Take 1 tablet (10 mg total) by mouth every evening. for 10 days  Dispense: 30 tablet; Refill: 0  -     predniSONE (DELTASONE) 5 MG tablet; Day 1-2: take 1 tab every 6 hours  Day 3-4: take 1 tab every 8 hours  Day 5-6: take 1 tab every 12 hours  Day 7-8: take 1 tab in am  Dispense: 20 tablet; Refill: 0

## 2019-09-06 VITALS
DIASTOLIC BLOOD PRESSURE: 89 MMHG | HEIGHT: 67 IN | WEIGHT: 188.5 LBS | TEMPERATURE: 99 F | OXYGEN SATURATION: 98 % | HEART RATE: 90 BPM | SYSTOLIC BLOOD PRESSURE: 118 MMHG | BODY MASS INDEX: 29.58 KG/M2

## 2019-09-06 PROBLEM — M54.2 NECK PAIN: Status: ACTIVE | Noted: 2019-09-06

## 2019-09-18 ENCOUNTER — OFFICE VISIT (OUTPATIENT)
Dept: INTERNAL MEDICINE | Facility: CLINIC | Age: 29
End: 2019-09-18
Payer: COMMERCIAL

## 2019-09-18 ENCOUNTER — LAB VISIT (OUTPATIENT)
Dept: LAB | Facility: HOSPITAL | Age: 29
End: 2019-09-18
Attending: NURSE PRACTITIONER
Payer: COMMERCIAL

## 2019-09-18 VITALS
HEART RATE: 88 BPM | DIASTOLIC BLOOD PRESSURE: 84 MMHG | SYSTOLIC BLOOD PRESSURE: 124 MMHG | TEMPERATURE: 99 F | WEIGHT: 189.13 LBS | BODY MASS INDEX: 29.69 KG/M2 | RESPIRATION RATE: 18 BRPM | HEIGHT: 67 IN

## 2019-09-18 DIAGNOSIS — M25.50 ARTHRALGIA, UNSPECIFIED JOINT: ICD-10-CM

## 2019-09-18 DIAGNOSIS — F41.9 ANXIETY: ICD-10-CM

## 2019-09-18 DIAGNOSIS — F40.243 FEAR OF FLYING: ICD-10-CM

## 2019-09-18 DIAGNOSIS — E61.1 IRON DEFICIENCY: ICD-10-CM

## 2019-09-18 DIAGNOSIS — M25.50 ARTHRALGIA, UNSPECIFIED JOINT: Primary | ICD-10-CM

## 2019-09-18 LAB
BASOPHILS # BLD AUTO: 0.05 K/UL (ref 0–0.2)
BASOPHILS NFR BLD: 0.8 % (ref 0–1.9)
DIFFERENTIAL METHOD: ABNORMAL
EOSINOPHIL # BLD AUTO: 0.2 K/UL (ref 0–0.5)
EOSINOPHIL NFR BLD: 3.4 % (ref 0–8)
ERYTHROCYTE [DISTWIDTH] IN BLOOD BY AUTOMATED COUNT: 12.9 % (ref 11.5–14.5)
HCT VFR BLD AUTO: 40.1 % (ref 37–48.5)
HGB BLD-MCNC: 12.8 G/DL (ref 12–16)
IMM GRANULOCYTES # BLD AUTO: 0.01 K/UL (ref 0–0.04)
IMM GRANULOCYTES NFR BLD AUTO: 0.2 % (ref 0–0.5)
LYMPHOCYTES # BLD AUTO: 2.3 K/UL (ref 1–4.8)
LYMPHOCYTES NFR BLD: 35.4 % (ref 18–48)
MCH RBC QN AUTO: 30.2 PG (ref 27–31)
MCHC RBC AUTO-ENTMCNC: 31.9 G/DL (ref 32–36)
MCV RBC AUTO: 95 FL (ref 82–98)
MONOCYTES # BLD AUTO: 0.8 K/UL (ref 0.3–1)
MONOCYTES NFR BLD: 12.8 % (ref 4–15)
NEUTROPHILS # BLD AUTO: 3.1 K/UL (ref 1.8–7.7)
NEUTROPHILS NFR BLD: 47.4 % (ref 38–73)
NRBC BLD-RTO: 0 /100 WBC
PLATELET # BLD AUTO: 264 K/UL (ref 150–350)
PMV BLD AUTO: 10.4 FL (ref 9.2–12.9)
RBC # BLD AUTO: 4.24 M/UL (ref 4–5.4)
WBC # BLD AUTO: 6.56 K/UL (ref 3.9–12.7)

## 2019-09-18 PROCEDURE — 82607 VITAMIN B-12: CPT

## 2019-09-18 PROCEDURE — 99999 PR PBB SHADOW E&M-EST. PATIENT-LVL IV: ICD-10-PCS | Mod: PBBFAC,,, | Performed by: NURSE PRACTITIONER

## 2019-09-18 PROCEDURE — 99999 PR PBB SHADOW E&M-EST. PATIENT-LVL IV: CPT | Mod: PBBFAC,,, | Performed by: NURSE PRACTITIONER

## 2019-09-18 PROCEDURE — 82728 ASSAY OF FERRITIN: CPT

## 2019-09-18 PROCEDURE — 3008F BODY MASS INDEX DOCD: CPT | Mod: CPTII,S$GLB,, | Performed by: NURSE PRACTITIONER

## 2019-09-18 PROCEDURE — 36415 COLL VENOUS BLD VENIPUNCTURE: CPT | Mod: PO

## 2019-09-18 PROCEDURE — 80053 COMPREHEN METABOLIC PANEL: CPT

## 2019-09-18 PROCEDURE — 99214 PR OFFICE/OUTPT VISIT, EST, LEVL IV, 30-39 MIN: ICD-10-PCS | Mod: S$GLB,,, | Performed by: NURSE PRACTITIONER

## 2019-09-18 PROCEDURE — 83540 ASSAY OF IRON: CPT

## 2019-09-18 PROCEDURE — 3008F PR BODY MASS INDEX (BMI) DOCUMENTED: ICD-10-PCS | Mod: CPTII,S$GLB,, | Performed by: NURSE PRACTITIONER

## 2019-09-18 PROCEDURE — 99214 OFFICE O/P EST MOD 30 MIN: CPT | Mod: S$GLB,,, | Performed by: NURSE PRACTITIONER

## 2019-09-18 PROCEDURE — 85025 COMPLETE CBC W/AUTO DIFF WBC: CPT

## 2019-09-18 RX ORDER — SERTRALINE HYDROCHLORIDE 100 MG/1
150 TABLET, FILM COATED ORAL DAILY
Qty: 30 TABLET | Refills: 1 | Status: SHIPPED | OUTPATIENT
Start: 2019-09-18 | End: 2019-09-18 | Stop reason: SDUPTHER

## 2019-09-18 RX ORDER — IRON,CARBONYL/ASCORBIC ACID 100-250 MG
TABLET ORAL
Qty: 30 TABLET | Refills: 0 | Status: SHIPPED | OUTPATIENT
Start: 2019-09-18 | End: 2019-10-27 | Stop reason: SDUPTHER

## 2019-09-18 RX ORDER — ALPRAZOLAM 0.25 MG/1
TABLET ORAL
Qty: 4 TABLET | Refills: 0 | Status: SHIPPED | OUTPATIENT
Start: 2019-09-18 | End: 2020-02-10

## 2019-09-18 RX ORDER — SERTRALINE HYDROCHLORIDE 100 MG/1
150 TABLET, FILM COATED ORAL DAILY
Qty: 45 TABLET | Refills: 1 | Status: SHIPPED | OUTPATIENT
Start: 2019-09-18 | End: 2020-09-28

## 2019-09-18 NOTE — TELEPHONE ENCOUNTER
----- Message from GILDARDO Fan-SHAWNEE sent at 9/18/2019  9:14 AM CDT -----  Give number for social work/counseling department so patient can call and request apt with counseling

## 2019-09-18 NOTE — PROGRESS NOTES
"Subjective:       Patient ID: Nathalie Roland is a 29 y.o. female.    Chief Complaint: Anxiety    Patient comes in for worsening anxiety for about a year now. Her  and 2 year old daughter are here with her today. She is on zoloft 100mg. She did some counseling in the past but does not feel that her counselor was a good match for her. Her anxiety is mostly at home. She does not feel anxious at work. Denies si/hi. Wants meds adjusted today.    Going on a trip and needing xanax for flight anxiety.    Has occasional multiple joint pains at times. In the past it was due to low iron. Is taking icar C but joint pains are still present at times, mostly at night. Shoulders, elbows, knees, ankles. Wanting levels checked.    Anxiety   Presents for follow-up visit. Symptoms include decreased concentration, excessive worry, hyperventilation, insomnia, irritability, nervous/anxious behavior, obsessions and panic. Patient reports no chest pain, compulsions, confusion, depressed mood, dizziness, dry mouth, feeling of choking, impotence, malaise, muscle tension, nausea, palpitations, restlessness, shortness of breath or suicidal ideas. Symptoms occur constantly. The severity of symptoms is moderate. The quality of sleep is good. Nighttime awakenings: one to two, several.     Compliance with medications is %.       /84 (BP Location: Left arm, Patient Position: Sitting)   Pulse 88   Temp 98.7 °F (37.1 °C) (Oral)   Resp 18   Ht 5' 7" (1.702 m)   Wt 85.8 kg (189 lb 2.5 oz)   LMP 09/10/2019   Breastfeeding? Yes   BMI 29.63 kg/m²     Review of Systems   Constitutional: Positive for activity change and irritability. Negative for appetite change, chills, diaphoresis, fatigue, fever and unexpected weight change.   HENT: Negative.  Negative for hearing loss, rhinorrhea and trouble swallowing.    Eyes: Negative for discharge and visual disturbance.   Respiratory: Negative for cough, chest tightness, " shortness of breath and wheezing.    Cardiovascular: Negative for chest pain, palpitations and leg swelling.   Gastrointestinal: Negative for abdominal distention, abdominal pain, blood in stool, constipation, diarrhea, nausea and vomiting.   Endocrine: Negative for polydipsia and polyuria.   Genitourinary: Negative for decreased urine volume, difficulty urinating, dysuria, frequency, hematuria, impotence and urgency.   Musculoskeletal: Positive for arthralgias. Negative for back pain, gait problem and joint swelling.   Neurological: Negative.  Negative for dizziness, syncope, speech difficulty, weakness, light-headedness and headaches.   Psychiatric/Behavioral: Positive for behavioral problems, decreased concentration, dysphoric mood and sleep disturbance. Negative for agitation, confusion, hallucinations and suicidal ideas. The patient is nervous/anxious and has insomnia.        Objective:      Physical Exam   Constitutional: She is oriented to person, place, and time. She appears well-developed and well-nourished. She is cooperative. No distress.   HENT:   Head: Normocephalic and atraumatic.   Eyes: Conjunctivae are normal. Right eye exhibits no discharge. Left eye exhibits no discharge.   Cardiovascular: Normal rate, regular rhythm and normal heart sounds.   No murmur heard.  Pulmonary/Chest: Effort normal and breath sounds normal. No respiratory distress. She has no wheezes. She has no rales. She exhibits no tenderness.   Abdominal: Soft. She exhibits no distension.   Musculoskeletal: Normal range of motion.   Neurological: She is alert and oriented to person, place, and time.   Skin: Skin is warm and dry. No rash noted. She is not diaphoretic.   Psychiatric: Her speech is normal and behavior is normal. Judgment and thought content normal. Her mood appears anxious.   Nursing note and vitals reviewed.      Assessment:       1. Arthralgia, unspecified joint    2. Iron deficiency    3. Anxiety    4. Fear of flying         Plan:       Nathalie was seen today for anxiety.    Diagnoses and all orders for this visit:    Arthralgia, unspecified joint  -     CBC auto differential; Future  -     Ferritin; Future  -     Iron and TIBC; Future  -     Vitamin B12; Future  -     Comprehensive metabolic panel; Future    Iron deficiency  -     CBC auto differential; Future  -     Ferritin; Future  -     Iron and TIBC; Future  -     Vitamin B12; Future  -     Comprehensive metabolic panel; Future    Anxiety  -     Ambulatory referral to Social Work  -     sertraline (ZOLOFT) 100 MG tablet; Take 1.5 tablets (150 mg total) by mouth once daily.    Fear of flying  -     ALPRAZolam (XANAX) 0.25 MG tablet; Take 1 tablet 30 minutes before flight    Check levels today per request   Increase zoloft to 150  Needs to start counseling  Encouraged exercise, recreation  Follow up 6 weeks

## 2019-09-18 NOTE — Clinical Note
Give number for social work/counseling department so patient can call and request apt with counseling

## 2019-09-19 LAB
ALBUMIN SERPL BCP-MCNC: 4.2 G/DL (ref 3.5–5.2)
ALP SERPL-CCNC: 71 U/L (ref 55–135)
ALT SERPL W/O P-5'-P-CCNC: 19 U/L (ref 10–44)
ANION GAP SERPL CALC-SCNC: 9 MMOL/L (ref 8–16)
AST SERPL-CCNC: 15 U/L (ref 10–40)
BILIRUB SERPL-MCNC: 0.5 MG/DL (ref 0.1–1)
BUN SERPL-MCNC: 17 MG/DL (ref 6–20)
CALCIUM SERPL-MCNC: 9.2 MG/DL (ref 8.7–10.5)
CHLORIDE SERPL-SCNC: 109 MMOL/L (ref 95–110)
CO2 SERPL-SCNC: 21 MMOL/L (ref 23–29)
CREAT SERPL-MCNC: 1.3 MG/DL (ref 0.5–1.4)
EST. GFR  (AFRICAN AMERICAN): >60 ML/MIN/1.73 M^2
EST. GFR  (NON AFRICAN AMERICAN): 55.2 ML/MIN/1.73 M^2
FERRITIN SERPL-MCNC: 59 NG/ML (ref 20–300)
GLUCOSE SERPL-MCNC: 93 MG/DL (ref 70–110)
IRON SERPL-MCNC: 36 UG/DL (ref 30–160)
POTASSIUM SERPL-SCNC: 4.6 MMOL/L (ref 3.5–5.1)
PROT SERPL-MCNC: 7.1 G/DL (ref 6–8.4)
SATURATED IRON: 9 % (ref 20–50)
SODIUM SERPL-SCNC: 139 MMOL/L (ref 136–145)
TOTAL IRON BINDING CAPACITY: 395 UG/DL (ref 250–450)
TRANSFERRIN SERPL-MCNC: 267 MG/DL (ref 200–375)
VIT B12 SERPL-MCNC: 554 PG/ML (ref 210–950)

## 2019-09-27 ENCOUNTER — TELEPHONE (OUTPATIENT)
Dept: NEPHROLOGY | Facility: CLINIC | Age: 29
End: 2019-09-27

## 2019-10-02 ENCOUNTER — PATIENT MESSAGE (OUTPATIENT)
Dept: NEUROLOGY | Facility: CLINIC | Age: 29
End: 2019-10-02

## 2019-10-09 ENCOUNTER — PATIENT MESSAGE (OUTPATIENT)
Dept: INTERNAL MEDICINE | Facility: CLINIC | Age: 29
End: 2019-10-09

## 2019-10-09 ENCOUNTER — PATIENT MESSAGE (OUTPATIENT)
Dept: NEPHROLOGY | Facility: CLINIC | Age: 29
End: 2019-10-09

## 2019-10-16 ENCOUNTER — LAB VISIT (OUTPATIENT)
Dept: LAB | Facility: HOSPITAL | Age: 29
End: 2019-10-16
Attending: INTERNAL MEDICINE
Payer: COMMERCIAL

## 2019-10-16 DIAGNOSIS — Q61.3 POLYCYSTIC KIDNEY DISEASE: ICD-10-CM

## 2019-10-16 LAB
ALBUMIN SERPL BCP-MCNC: 3.9 G/DL (ref 3.5–5.2)
ANION GAP SERPL CALC-SCNC: 9 MMOL/L (ref 8–16)
BUN SERPL-MCNC: 15 MG/DL (ref 6–20)
CALCIUM SERPL-MCNC: 9.1 MG/DL (ref 8.7–10.5)
CHLORIDE SERPL-SCNC: 108 MMOL/L (ref 95–110)
CO2 SERPL-SCNC: 20 MMOL/L (ref 23–29)
CREAT SERPL-MCNC: 1.3 MG/DL (ref 0.5–1.4)
EST. GFR  (AFRICAN AMERICAN): >60 ML/MIN/1.73 M^2
EST. GFR  (NON AFRICAN AMERICAN): 55.2 ML/MIN/1.73 M^2
GLUCOSE SERPL-MCNC: 93 MG/DL (ref 70–110)
PHOSPHATE SERPL-MCNC: 3.2 MG/DL (ref 2.7–4.5)
POTASSIUM SERPL-SCNC: 4.6 MMOL/L (ref 3.5–5.1)
SODIUM SERPL-SCNC: 137 MMOL/L (ref 136–145)

## 2019-10-16 PROCEDURE — 36415 COLL VENOUS BLD VENIPUNCTURE: CPT | Mod: PO

## 2019-10-16 PROCEDURE — 80069 RENAL FUNCTION PANEL: CPT

## 2019-10-19 ENCOUNTER — OFFICE VISIT (OUTPATIENT)
Dept: NEPHROLOGY | Facility: CLINIC | Age: 29
End: 2019-10-19
Payer: COMMERCIAL

## 2019-10-19 VITALS
HEART RATE: 99 BPM | DIASTOLIC BLOOD PRESSURE: 80 MMHG | SYSTOLIC BLOOD PRESSURE: 122 MMHG | BODY MASS INDEX: 29.83 KG/M2 | WEIGHT: 190.06 LBS | HEIGHT: 67 IN

## 2019-10-19 DIAGNOSIS — N18.30 CKD (CHRONIC KIDNEY DISEASE) STAGE 3, GFR 30-59 ML/MIN: Primary | ICD-10-CM

## 2019-10-19 DIAGNOSIS — Q61.3 POLYCYSTIC KIDNEY DISEASE: ICD-10-CM

## 2019-10-19 PROCEDURE — 99999 PR PBB SHADOW E&M-EST. PATIENT-LVL III: ICD-10-PCS | Mod: PBBFAC,,, | Performed by: INTERNAL MEDICINE

## 2019-10-19 PROCEDURE — 99999 PR PBB SHADOW E&M-EST. PATIENT-LVL III: CPT | Mod: PBBFAC,,, | Performed by: INTERNAL MEDICINE

## 2019-10-19 PROCEDURE — 3008F BODY MASS INDEX DOCD: CPT | Mod: CPTII,S$GLB,, | Performed by: INTERNAL MEDICINE

## 2019-10-19 PROCEDURE — 99213 PR OFFICE/OUTPT VISIT, EST, LEVL III, 20-29 MIN: ICD-10-PCS | Mod: S$GLB,,, | Performed by: INTERNAL MEDICINE

## 2019-10-19 PROCEDURE — 3008F PR BODY MASS INDEX (BMI) DOCUMENTED: ICD-10-PCS | Mod: CPTII,S$GLB,, | Performed by: INTERNAL MEDICINE

## 2019-10-19 PROCEDURE — 99213 OFFICE O/P EST LOW 20 MIN: CPT | Mod: S$GLB,,, | Performed by: INTERNAL MEDICINE

## 2019-10-19 NOTE — PATIENT INSTRUCTIONS
Hydrate with 60-80 ounces of water per day.     Avoid NSAID pain medications such as advil, aleve, motrin, ibuprofen, naprosyn, meloxicam, diclofenac, mobic.

## 2019-10-19 NOTE — PROGRESS NOTES
PROGRESS NOTE FOR ESTABLISHED PATIENT    REASON FOR VISIT: Polycystic kidney disease    29 y.o. female with history of PCKD, HTN, migraines, proteinuria presents to the renal clinic for evaluation of renal insufficiency.     2019:  Patient denies any complaints today. She is currently not interested in staring tolvaptan since she is considering another child.     2019:  Patient is feeling fine, no complaints.  She has not made a decision about tolvaptan since she is considering another child.         Past Medical History:   Diagnosis Date    Anemia     Anxiety     Hypertension     renal hypertension    Migraine     Polycystic kidney disease        Past Surgical History:   Procedure Laterality Date     SECTION      eye cyst      HERNIA REPAIR      UMBILICAL HERNIA REPAIR         Review of patient's allergies indicates:  No Known Allergies    Current Outpatient Medications   Medication Sig Dispense Refill    enalapril (VASOTEC) 20 MG tablet Take 0.5 tablets (10 mg total) by mouth 2 (two) times daily. 60 tablet 5    iron-vitamin C 100-250 mg, ICAR-C, 100-250 mg Tab TAKE 1 TABLET BY MOUTH EVERY DAY 30 tablet 0    labetalol (NORMODYNE) 100 MG tablet TAKE 1 TABLET BY MOUTH FOUR TIMES DAILY 120 tablet 9    sertraline (ZOLOFT) 100 MG tablet Take 1.5 tablets (150 mg total) by mouth once daily. 45 tablet 1    ALPRAZolam (XANAX) 0.25 MG tablet Take 1 tablet 30 minutes before flight 4 tablet 0     No current facility-administered medications for this visit.        Family History   Problem Relation Age of Onset    Polycystic kidney disease Mother     Hypertension Mother     Cancer Mother         thyroid cancer    Polycystic kidney disease Maternal Uncle     Hypertension Maternal Uncle     Polycystic kidney disease Maternal Grandfather     Hypertension Maternal Grandfather     Cancer Paternal Grandmother         breast cancer    Hypertension Paternal Grandmother     Heart  failure Paternal Grandmother     Heart attack Paternal Grandfather     Hypertension Paternal Grandfather     No Known Problems Father     Polycystic kidney disease Maternal Aunt     Bipolar disorder Paternal Aunt        Social History     Socioeconomic History    Marital status:      Spouse name: Not on file    Number of children: 1    Years of education: Not on file    Highest education level: Not on file   Occupational History     Employer: [employer]    Social Needs    Financial resource strain: Not very hard    Food insecurity:     Worry: Never true     Inability: Never true    Transportation needs:     Medical: No     Non-medical: No   Tobacco Use    Smoking status: Never Smoker    Smokeless tobacco: Never Used   Substance and Sexual Activity    Alcohol use: Yes     Alcohol/week: 0.0 standard drinks     Frequency: Monthly or less     Drinks per session: 1 or 2     Binge frequency: Less than monthly     Comment: socially    Drug use: No    Sexual activity: Yes     Partners: Male     Birth control/protection: None   Lifestyle    Physical activity:     Days per week: 3 days     Minutes per session: 40 min    Stress: To some extent   Relationships    Social connections:     Talks on phone: Once a week     Gets together: Once a week     Attends Zoroastrianism service: Not on file     Active member of club or organization: No     Attends meetings of clubs or organizations: Never     Relationship status:    Other Topics Concern    Not on file   Social History Narrative    Not on file       Review of Systems:  1. GENERAL: patient denies any fever, weight changes, generalized weakness, dizziness.  2. HEENT: patient denies headaches, visual disturbances, swallowing problems, sinus pain, nasal congestion.  3. CARDIOVASCULAR: patient denies chest pain, palpitations.  4. PULMONARY: patient denies SOB, coughing, hemoptysis, wheezing.  5. GASTROINTESTINAL: patient denies abdominal pain, nausea,  "vomiting, diarrhea.  6. GENITOURINARY: patient denies dysuria, hematuria, hesitancy, frequency.  7. EXTREMITIES: patient denies LE edema, LE cramping.  8. DERMATOLOGY: patient denies rashes, ulcers.  9. NEURO: patient denies tremors, extremity weakness, extremity numbness/tingling.  10. MUSCULOSKELETAL: patient denies joint pain, joint swelling.  11. HEMATOLOGY: patient denies rectal or gum bleeding.  12: PSYCH: patient denies anxiety, depression.      PHYSICAL EXAM:    /80   Pulse 99   Ht 5' 7" (1.702 m)   Wt 86.2 kg (190 lb 0.6 oz)   BMI 29.76 kg/m²     GENERAL: Pleasant lady presents to clinic with non-labored breathing.  HEENT: PER, no nasal discharge, no icterus, no oral exudates, moist mucosal membranes.  NECK: no thyroid mass, no lymphadenopathy.  HEART: RRR S1/S2, no rubs, good peripheral pulses.  LUNGS: CTA bilaterally, no wheezing, breathing is nonlabored.  ABDOMEN: soft, nontender, not distended, bowel sounds are present, no abdominal hernia.  EXTREM: no LE edema.  SKIN: no rashes, skin is warm and dry.  NEURO: A & O x 3, no obvious focal signs.    LABORATORY RESULTS:    Lab Results   Component Value Date    CREATININE 1.3 10/16/2019    BUN 15 10/16/2019     10/16/2019    K 4.6 10/16/2019     10/16/2019    CO2 20 (L) 10/16/2019      Lab Results   Component Value Date    CALCIUM 9.1 10/16/2019    PHOS 3.2 10/16/2019     Lab Results   Component Value Date    ALBUMIN 3.9 10/16/2019     Lab Results   Component Value Date    WBC 6.56 09/18/2019    HGB 12.8 09/18/2019    HCT 40.1 09/18/2019    MCV 95 09/18/2019     09/18/2019     Protein Creatinine Ratios:    Creatinine, Random Ur   Date Value Ref Range Status   01/25/2018 22.0 15.0 - 325.0 mg/dL Final     Comment:     The random urine reference ranges provided were established   for 24 hour urine collections.  No reference ranges exist for  random urine specimens.  Correlate clinically.     08/30/2017 22.0 15.0 - 325.0 mg/dL Final "     Comment:     The random urine reference ranges provided were established   for 24 hour urine collections.  No reference ranges exist for  random urine specimens.  Correlate clinically.     05/18/2017 85.0 15.0 - 325.0 mg/dL Final     Comment:     The random urine reference ranges provided were established   for 24 hour urine collections.  No reference ranges exist for  random urine specimens.  Correlate clinically.       Protein, Urine Random   Date Value Ref Range Status   01/25/2018 <7 0 - 15 mg/dL Final     Comment:     The random urine reference ranges provided were established   for 24 hour urine collections.  No reference ranges exist for  random urine specimens.  Correlate clinically.     08/30/2017 <7 0 - 15 mg/dL Final     Comment:     The random urine reference ranges provided were established   for 24 hour urine collections.  No reference ranges exist for  random urine specimens.  Correlate clinically.     05/18/2017 69 (H) 0 - 15 mg/dL Final     Comment:     The random urine reference ranges provided were established   for 24 hour urine collections.  No reference ranges exist for  random urine specimens.  Correlate clinically.       Prot/Creat Ratio, Ur   Date Value Ref Range Status   01/25/2018 Unable to calculate 0.00 - 0.20 Final   08/30/2017 Unable to calculate 0.00 - 0.20 Final   05/18/2017 0.81 (H) 0.00 - 0.20 Final           ASSESSMENT AND PLAN:  29 y.o. year old female with history of PCKD, HTN, migraines, proteinuria presents to the renal clinic for evaluation of renal insufficiency.    1. PCKD: Patient's renal function has stabilized with creatinine at 1.3. Patient was advised to hydrate with 60-80 ounces of water per day. Proteinuria has resolved. Patient's renal function will be monitored closely and she will return to the clinic in 6 months for follow up. Patient was advised to avoid NSAID pain medications such as advil, aleve, motrin, ibuprofen, naprosyn, meloxicam, diclofenac, mobic.    Patient was introduced to Jynarque/tolvaptan as a treatment for PCKD but is currently not interested in staring this medication (she is considering to have another child).     2. Electrolytes: Within normal limits.    3. Acid base status: Borderline acidosis.     4. Volume: Euvolemic.     5. Hypertension: Good BP control.     6. Medications: Reviewed. Continue ACE-I.

## 2019-10-27 RX ORDER — IRON,CARBONYL/ASCORBIC ACID 100-250 MG
TABLET ORAL
Qty: 30 TABLET | Refills: 0 | Status: SHIPPED | OUTPATIENT
Start: 2019-10-27 | End: 2019-12-01 | Stop reason: SDUPTHER

## 2019-12-01 RX ORDER — IRON,CARBONYL/ASCORBIC ACID 100-250 MG
TABLET ORAL
Qty: 30 TABLET | Refills: 0 | Status: SHIPPED | OUTPATIENT
Start: 2019-12-01 | End: 2020-02-10 | Stop reason: SDUPTHER

## 2019-12-13 ENCOUNTER — PATIENT MESSAGE (OUTPATIENT)
Dept: ORTHOPEDICS | Facility: CLINIC | Age: 29
End: 2019-12-13

## 2019-12-13 DIAGNOSIS — M25.531 RIGHT WRIST PAIN: Primary | ICD-10-CM

## 2020-02-04 ENCOUNTER — TELEPHONE (OUTPATIENT)
Dept: NEPHROLOGY | Facility: CLINIC | Age: 30
End: 2020-02-04

## 2020-02-10 ENCOUNTER — OFFICE VISIT (OUTPATIENT)
Dept: INTERNAL MEDICINE | Facility: CLINIC | Age: 30
End: 2020-02-10
Payer: COMMERCIAL

## 2020-02-10 VITALS
HEART RATE: 68 BPM | BODY MASS INDEX: 30.1 KG/M2 | TEMPERATURE: 98 F | SYSTOLIC BLOOD PRESSURE: 120 MMHG | WEIGHT: 191.81 LBS | HEIGHT: 67 IN | DIASTOLIC BLOOD PRESSURE: 82 MMHG

## 2020-02-10 DIAGNOSIS — F41.9 ANXIETY: Primary | ICD-10-CM

## 2020-02-10 DIAGNOSIS — E61.1 IRON DEFICIENCY: ICD-10-CM

## 2020-02-10 DIAGNOSIS — I12.9 HYPERTENSION, RENAL: ICD-10-CM

## 2020-02-10 DIAGNOSIS — Q61.3 POLYCYSTIC KIDNEY DISEASE: ICD-10-CM

## 2020-02-10 PROCEDURE — 99214 OFFICE O/P EST MOD 30 MIN: CPT | Mod: S$GLB,,, | Performed by: FAMILY MEDICINE

## 2020-02-10 PROCEDURE — 3008F BODY MASS INDEX DOCD: CPT | Mod: CPTII,S$GLB,, | Performed by: FAMILY MEDICINE

## 2020-02-10 PROCEDURE — 99214 PR OFFICE/OUTPT VISIT, EST, LEVL IV, 30-39 MIN: ICD-10-PCS | Mod: S$GLB,,, | Performed by: FAMILY MEDICINE

## 2020-02-10 PROCEDURE — 99999 PR PBB SHADOW E&M-EST. PATIENT-LVL III: CPT | Mod: PBBFAC,,, | Performed by: FAMILY MEDICINE

## 2020-02-10 PROCEDURE — 3008F PR BODY MASS INDEX (BMI) DOCUMENTED: ICD-10-PCS | Mod: CPTII,S$GLB,, | Performed by: FAMILY MEDICINE

## 2020-02-10 PROCEDURE — 99999 PR PBB SHADOW E&M-EST. PATIENT-LVL III: ICD-10-PCS | Mod: PBBFAC,,, | Performed by: FAMILY MEDICINE

## 2020-02-10 RX ORDER — IRON,CARBONYL/ASCORBIC ACID 100-250 MG
1 TABLET ORAL DAILY
Qty: 30 TABLET | Refills: 6 | Status: SHIPPED | OUTPATIENT
Start: 2020-02-10 | End: 2020-12-07

## 2020-02-11 NOTE — PROGRESS NOTES
Subjective:      Patient ID: Nathalie Roland is a 29 y.o. female.    Chief Complaint: discuss pregnancy and Anxiety    HPI  30 yo with anxiety, HTN, polycystic kidney disease, anemia, anxiety here for f/u  Taking zoloft/anxiety feels like it is more right now.  Not sure why, no specific triggers.  Is open to counceling, just had bad experience in the past.  Considering pregnancy.  Has high risk, HTN.  Knows she will have to stop ACE.  Under care of Nephro  Has anemia, taking iron daily  Cycles are stable, no worsening    Past Medical History:   Diagnosis Date    Anemia     Anxiety     Hypertension     renal hypertension    Migraine     Polycystic kidney disease      Family History   Problem Relation Age of Onset    Polycystic kidney disease Mother     Hypertension Mother     Cancer Mother         thyroid cancer    Polycystic kidney disease Maternal Uncle     Hypertension Maternal Uncle     Polycystic kidney disease Maternal Grandfather     Hypertension Maternal Grandfather     Cancer Paternal Grandmother         breast cancer    Hypertension Paternal Grandmother     Heart failure Paternal Grandmother     Heart attack Paternal Grandfather     Hypertension Paternal Grandfather     No Known Problems Father     Polycystic kidney disease Maternal Aunt     Bipolar disorder Paternal Aunt      Past Surgical History:   Procedure Laterality Date     SECTION      eye cyst      HERNIA REPAIR      UMBILICAL HERNIA REPAIR       Social History     Tobacco Use    Smoking status: Never Smoker    Smokeless tobacco: Never Used   Substance Use Topics    Alcohol use: Yes     Alcohol/week: 0.0 standard drinks     Frequency: Monthly or less     Drinks per session: 1 or 2     Binge frequency: Less than monthly     Comment: socially    Drug use: No       /82 (BP Location: Left arm, Patient Position: Sitting, BP Method: X-Large (Manual))   Pulse 68   Temp 98.3 °F (36.8 °C) (Oral)   Ht  "5' 7" (1.702 m)   Wt 87 kg (191 lb 12.8 oz)   BMI 30.04 kg/m²     Review of Systems   Constitutional: Negative for activity change, appetite change, chills, diaphoresis, fatigue, fever and unexpected weight change.   HENT: Negative for ear pain, hearing loss, postnasal drip, rhinorrhea and tinnitus.    Eyes: Negative for visual disturbance.   Respiratory: Negative for cough, shortness of breath and wheezing.    Cardiovascular: Negative for chest pain, palpitations and leg swelling.   Gastrointestinal: Negative for abdominal distention, diarrhea and nausea.   Genitourinary: Negative for dysuria, frequency, hematuria and urgency.   Musculoskeletal: Negative for back pain and joint swelling.   Neurological: Negative for weakness and headaches.   Psychiatric/Behavioral: Negative for confusion and decreased concentration. The patient is nervous/anxious.        Objective:     Physical Exam   Constitutional: She is oriented to person, place, and time. She appears well-developed and well-nourished. No distress.   HENT:   Right Ear: External ear normal.   Left Ear: External ear normal.   Nose: Nose normal.   Mouth/Throat: Oropharynx is clear and moist.   Eyes: Pupils are equal, round, and reactive to light. Conjunctivae are normal.   Neck: Normal range of motion. Neck supple. Carotid bruit is not present. No thyromegaly present.   Cardiovascular: Normal rate, regular rhythm and normal heart sounds. Exam reveals no gallop.   No murmur heard.  Pulmonary/Chest: Effort normal and breath sounds normal. No respiratory distress. She has no wheezes. She has no rales.   Abdominal: Soft. Bowel sounds are normal. She exhibits no distension. There is no tenderness. There is no guarding.   Musculoskeletal: Normal range of motion. She exhibits no edema.   Lymphadenopathy:     She has no cervical adenopathy.   Neurological: She is alert and oriented to person, place, and time. No cranial nerve deficit.   Skin: Skin is warm and dry. No " rash noted.   Psychiatric: She has a normal mood and affect. Her behavior is normal. Judgment and thought content normal.   Nursing note and vitals reviewed.      Lab Results   Component Value Date    WBC 6.56 09/18/2019    HGB 12.8 09/18/2019    HCT 40.1 09/18/2019     09/18/2019    CHOL 186 12/12/2013    TRIG 114 12/12/2013    HDL 52 12/12/2013    ALT 19 09/18/2019    AST 15 09/18/2019     10/16/2019    K 4.6 10/16/2019     10/16/2019    CREATININE 1.3 10/16/2019    BUN 15 10/16/2019    CO2 20 (L) 10/16/2019    TSH 1.716 08/27/2018       Assessment:     1. Anxiety    2. Hypertension, renal    3. Iron deficiency    4. Polycystic kidney disease         Plan:     Anxiety  -     Ambulatory referral/consult to Psychology; Future; Expected date: 02/17/2020    Hypertension, renal    Iron deficiency  -     Ferritin; Future; Expected date: 02/10/2020  -     Iron and TIBC; Future; Expected date: 02/10/2020    Polycystic kidney disease    Other orders  -     iron-vitamin C 100-250 mg, ICAR-C, 100-250 mg Tab; Take 1 tablet by mouth once daily.  Dispense: 30 tablet; Refill: 6    Reviewed labs, cont daily iron supplements  BP stable/cont current meds.  Once trying to get pregnant//will need to stop ACE/replace  Cont with Nephro  Anxiety, up some.  Cont zoloft 150mg nightly  Start counceling  F/U with me 6 mos//sooner if needed

## 2020-02-13 ENCOUNTER — DOCUMENTATION ONLY (OUTPATIENT)
Dept: NEPHROLOGY | Facility: CLINIC | Age: 30
End: 2020-02-13

## 2020-02-13 DIAGNOSIS — I10 HYPERTENSION, UNSPECIFIED TYPE: ICD-10-CM

## 2020-02-13 RX ORDER — ENALAPRIL MALEATE 20 MG/1
10 TABLET ORAL 2 TIMES DAILY
Qty: 60 TABLET | Refills: 5 | Status: SHIPPED | OUTPATIENT
Start: 2020-02-13 | End: 2021-03-24

## 2020-02-21 ENCOUNTER — PATIENT MESSAGE (OUTPATIENT)
Dept: INTERNAL MEDICINE | Facility: CLINIC | Age: 30
End: 2020-02-21

## 2020-02-21 RX ORDER — ALPRAZOLAM 0.25 MG/1
0.25 TABLET ORAL 2 TIMES DAILY PRN
Qty: 10 TABLET | Refills: 0 | Status: SHIPPED | OUTPATIENT
Start: 2020-02-21 | End: 2020-09-28

## 2020-05-19 ENCOUNTER — LAB VISIT (OUTPATIENT)
Dept: LAB | Facility: HOSPITAL | Age: 30
End: 2020-05-19
Attending: INTERNAL MEDICINE
Payer: COMMERCIAL

## 2020-05-19 DIAGNOSIS — E61.1 IRON DEFICIENCY: ICD-10-CM

## 2020-05-19 DIAGNOSIS — Q61.3 POLYCYSTIC KIDNEY DISEASE: ICD-10-CM

## 2020-05-19 DIAGNOSIS — N18.30 CKD (CHRONIC KIDNEY DISEASE) STAGE 3, GFR 30-59 ML/MIN: ICD-10-CM

## 2020-05-19 LAB
ALBUMIN SERPL BCP-MCNC: 3.9 G/DL (ref 3.5–5.2)
ANION GAP SERPL CALC-SCNC: 9 MMOL/L (ref 8–16)
BASOPHILS # BLD AUTO: 0.05 K/UL (ref 0–0.2)
BASOPHILS NFR BLD: 0.8 % (ref 0–1.9)
BUN SERPL-MCNC: 13 MG/DL (ref 6–20)
CALCIUM SERPL-MCNC: 9 MG/DL (ref 8.7–10.5)
CHLORIDE SERPL-SCNC: 107 MMOL/L (ref 95–110)
CO2 SERPL-SCNC: 20 MMOL/L (ref 23–29)
CREAT SERPL-MCNC: 1.4 MG/DL (ref 0.5–1.4)
DIFFERENTIAL METHOD: ABNORMAL
EOSINOPHIL # BLD AUTO: 0.3 K/UL (ref 0–0.5)
EOSINOPHIL NFR BLD: 3.8 % (ref 0–8)
ERYTHROCYTE [DISTWIDTH] IN BLOOD BY AUTOMATED COUNT: 12.2 % (ref 11.5–14.5)
EST. GFR  (AFRICAN AMERICAN): 58.2 ML/MIN/1.73 M^2
EST. GFR  (NON AFRICAN AMERICAN): 50.5 ML/MIN/1.73 M^2
FERRITIN SERPL-MCNC: 27 NG/ML (ref 20–300)
GLUCOSE SERPL-MCNC: 100 MG/DL (ref 70–110)
HCT VFR BLD AUTO: 40.1 % (ref 37–48.5)
HGB BLD-MCNC: 12.6 G/DL (ref 12–16)
IMM GRANULOCYTES # BLD AUTO: 0.01 K/UL (ref 0–0.04)
IMM GRANULOCYTES NFR BLD AUTO: 0.2 % (ref 0–0.5)
IRON SERPL-MCNC: 77 UG/DL (ref 30–160)
LYMPHOCYTES # BLD AUTO: 2.4 K/UL (ref 1–4.8)
LYMPHOCYTES NFR BLD: 35.9 % (ref 18–48)
MCH RBC QN AUTO: 29.6 PG (ref 27–31)
MCHC RBC AUTO-ENTMCNC: 31.4 G/DL (ref 32–36)
MCV RBC AUTO: 94 FL (ref 82–98)
MONOCYTES # BLD AUTO: 0.5 K/UL (ref 0.3–1)
MONOCYTES NFR BLD: 7.5 % (ref 4–15)
NEUTROPHILS # BLD AUTO: 3.4 K/UL (ref 1.8–7.7)
NEUTROPHILS NFR BLD: 51.8 % (ref 38–73)
NRBC BLD-RTO: 0 /100 WBC
PHOSPHATE SERPL-MCNC: 3.2 MG/DL (ref 2.7–4.5)
PLATELET # BLD AUTO: 283 K/UL (ref 150–350)
PMV BLD AUTO: 11.2 FL (ref 9.2–12.9)
POTASSIUM SERPL-SCNC: 4.7 MMOL/L (ref 3.5–5.1)
RBC # BLD AUTO: 4.26 M/UL (ref 4–5.4)
SATURATED IRON: 21 % (ref 20–50)
SODIUM SERPL-SCNC: 136 MMOL/L (ref 136–145)
TOTAL IRON BINDING CAPACITY: 366 UG/DL (ref 250–450)
TRANSFERRIN SERPL-MCNC: 247 MG/DL (ref 200–375)
WBC # BLD AUTO: 6.57 K/UL (ref 3.9–12.7)

## 2020-05-19 PROCEDURE — 85025 COMPLETE CBC W/AUTO DIFF WBC: CPT

## 2020-05-19 PROCEDURE — 82728 ASSAY OF FERRITIN: CPT

## 2020-05-19 PROCEDURE — 36415 COLL VENOUS BLD VENIPUNCTURE: CPT

## 2020-05-19 PROCEDURE — 80069 RENAL FUNCTION PANEL: CPT

## 2020-05-19 PROCEDURE — 83540 ASSAY OF IRON: CPT

## 2020-05-21 ENCOUNTER — PATIENT OUTREACH (OUTPATIENT)
Dept: ADMINISTRATIVE | Facility: OTHER | Age: 30
End: 2020-05-21

## 2020-05-23 ENCOUNTER — OFFICE VISIT (OUTPATIENT)
Dept: NEPHROLOGY | Facility: CLINIC | Age: 30
End: 2020-05-23
Payer: COMMERCIAL

## 2020-05-23 VITALS
HEART RATE: 94 BPM | HEIGHT: 67 IN | SYSTOLIC BLOOD PRESSURE: 115 MMHG | BODY MASS INDEX: 28.79 KG/M2 | DIASTOLIC BLOOD PRESSURE: 72 MMHG | WEIGHT: 183.44 LBS

## 2020-05-23 DIAGNOSIS — N18.30 CKD (CHRONIC KIDNEY DISEASE) STAGE 3, GFR 30-59 ML/MIN: ICD-10-CM

## 2020-05-23 DIAGNOSIS — Q61.3 POLYCYSTIC KIDNEY DISEASE: Primary | ICD-10-CM

## 2020-05-23 DIAGNOSIS — I67.1 CEREBRAL ANEURYSM: ICD-10-CM

## 2020-05-23 PROCEDURE — 3008F PR BODY MASS INDEX (BMI) DOCUMENTED: ICD-10-PCS | Mod: CPTII,S$GLB,, | Performed by: INTERNAL MEDICINE

## 2020-05-23 PROCEDURE — 3008F BODY MASS INDEX DOCD: CPT | Mod: CPTII,S$GLB,, | Performed by: INTERNAL MEDICINE

## 2020-05-23 PROCEDURE — 99214 OFFICE O/P EST MOD 30 MIN: CPT | Mod: S$GLB,,, | Performed by: INTERNAL MEDICINE

## 2020-05-23 PROCEDURE — 99999 PR PBB SHADOW E&M-EST. PATIENT-LVL III: CPT | Mod: PBBFAC,,, | Performed by: INTERNAL MEDICINE

## 2020-05-23 PROCEDURE — 99214 PR OFFICE/OUTPT VISIT, EST, LEVL IV, 30-39 MIN: ICD-10-PCS | Mod: S$GLB,,, | Performed by: INTERNAL MEDICINE

## 2020-05-23 PROCEDURE — 99999 PR PBB SHADOW E&M-EST. PATIENT-LVL III: ICD-10-PCS | Mod: PBBFAC,,, | Performed by: INTERNAL MEDICINE

## 2020-05-23 RX ORDER — ESCITALOPRAM OXALATE 10 MG/1
15 TABLET ORAL DAILY
COMMUNITY
Start: 2020-05-22 | End: 2020-10-19

## 2020-05-23 NOTE — PROGRESS NOTES
PROGRESS NOTE FOR ESTABLISHED PATIENT    REASON FOR VISIT: Polycystic kidney disease    29 y.o. female with history of PCKD, HTN, migraines, proteinuria presents to the renal clinic for evaluation of renal insufficiency.     2019:  Patient denies any complaints today. She is currently not interested in staring tolvaptan since she is considering another child.     2019:  Patient is feeling fine, no complaints.  She has not made a decision about tolvaptan since she is considering another child.     May 23, 2020:  Patient is feeling fine, Creatinine at 1.4 today. Patient is requesting repeat MRA since her aunt  from cerebral aneurysm.         Past Medical History:   Diagnosis Date    Anemia     Anxiety     Hypertension     renal hypertension    Migraine     Polycystic kidney disease        Past Surgical History:   Procedure Laterality Date     SECTION      eye cyst      HERNIA REPAIR      UMBILICAL HERNIA REPAIR         Review of patient's allergies indicates:  No Known Allergies    Current Outpatient Medications   Medication Sig Dispense Refill    enalapril (VASOTEC) 20 MG tablet Take 0.5 tablets (10 mg total) by mouth 2 (two) times daily. 60 tablet 5    escitalopram oxalate (LEXAPRO) 10 MG tablet       iron-vitamin C 100-250 mg, ICAR-C, 100-250 mg Tab Take 1 tablet by mouth once daily. 30 tablet 6    labetalol (NORMODYNE) 100 MG tablet TAKE 1 TABLET BY MOUTH FOUR TIMES DAILY 120 tablet 9    ALPRAZolam (XANAX) 0.25 MG tablet Take 1 tablet (0.25 mg total) by mouth 2 (two) times daily as needed for Anxiety. 10 tablet 0    sertraline (ZOLOFT) 100 MG tablet Take 1.5 tablets (150 mg total) by mouth once daily. 45 tablet 1     No current facility-administered medications for this visit.        Family History   Problem Relation Age of Onset    Polycystic kidney disease Mother     Hypertension Mother     Cancer Mother         thyroid cancer    Polycystic kidney disease  Maternal Uncle     Hypertension Maternal Uncle     Polycystic kidney disease Maternal Grandfather     Hypertension Maternal Grandfather     Cancer Paternal Grandmother         breast cancer    Hypertension Paternal Grandmother     Heart failure Paternal Grandmother     Heart attack Paternal Grandfather     Hypertension Paternal Grandfather     No Known Problems Father     Polycystic kidney disease Maternal Aunt     Bipolar disorder Paternal Aunt        Social History     Socioeconomic History    Marital status:      Spouse name: Not on file    Number of children: 1    Years of education: Not on file    Highest education level: Not on file   Occupational History     Employer: [employer]    Social Needs    Financial resource strain: Not very hard    Food insecurity:     Worry: Never true     Inability: Never true    Transportation needs:     Medical: No     Non-medical: No   Tobacco Use    Smoking status: Never Smoker    Smokeless tobacco: Never Used   Substance and Sexual Activity    Alcohol use: Yes     Alcohol/week: 0.0 standard drinks     Frequency: Monthly or less     Drinks per session: 1 or 2     Binge frequency: Less than monthly     Comment: socially    Drug use: No    Sexual activity: Yes     Partners: Male     Birth control/protection: None   Lifestyle    Physical activity:     Days per week: 3 days     Minutes per session: 40 min    Stress: To some extent   Relationships    Social connections:     Talks on phone: Once a week     Gets together: Once a week     Attends Anabaptist service: Not on file     Active member of club or organization: No     Attends meetings of clubs or organizations: Never     Relationship status:    Other Topics Concern    Not on file   Social History Narrative    Not on file       Review of Systems:  1. GENERAL: patient denies any fever, weight changes, generalized weakness, dizziness.  2. HEENT: patient denies headaches, visual disturbances,  "swallowing problems, sinus pain, nasal congestion.  3. CARDIOVASCULAR: patient denies chest pain, palpitations.  4. PULMONARY: patient denies SOB, coughing, hemoptysis, wheezing.  5. GASTROINTESTINAL: patient denies abdominal pain, nausea, vomiting, diarrhea.  6. GENITOURINARY: patient denies dysuria, hematuria, hesitancy, frequency.  7. EXTREMITIES: patient denies LE edema, LE cramping.  8. DERMATOLOGY: patient denies rashes, ulcers.  9. NEURO: patient denies tremors, extremity weakness, extremity numbness/tingling.  10. MUSCULOSKELETAL: patient denies joint pain, joint swelling.  11. HEMATOLOGY: patient denies rectal or gum bleeding.  12: PSYCH: patient denies anxiety, depression.      PHYSICAL EXAM:    /72   Pulse 94   Ht 5' 7" (1.702 m)   Wt 83.2 kg (183 lb 6.8 oz)   BMI 28.73 kg/m²     GENERAL: Pleasant lady presents to clinic with non-labored breathing.  HEENT: PER, no nasal discharge, no icterus, no oral exudates, moist mucosal membranes.  NECK: no thyroid mass, no lymphadenopathy.  HEART: RRR S1/S2, no rubs, good peripheral pulses.  LUNGS: CTA bilaterally, no wheezing, breathing is nonlabored.  ABDOMEN: soft, nontender, not distended, bowel sounds are present, no abdominal hernia.  EXTREM: no LE edema.  SKIN: no rashes, skin is warm and dry.  NEURO: A & O x 3, no obvious focal signs.    LABORATORY RESULTS:    Lab Results   Component Value Date    CREATININE 1.4 05/19/2020    BUN 13 05/19/2020     05/19/2020    K 4.7 05/19/2020     05/19/2020    CO2 20 (L) 05/19/2020      Lab Results   Component Value Date    CALCIUM 9.0 05/19/2020    PHOS 3.2 05/19/2020     Lab Results   Component Value Date    ALBUMIN 3.9 05/19/2020     Lab Results   Component Value Date    WBC 6.57 05/19/2020    HGB 12.6 05/19/2020    HCT 40.1 05/19/2020    MCV 94 05/19/2020     05/19/2020     Protein Creatinine Ratios:    Creatinine, Random Ur   Date Value Ref Range Status   05/19/2020 19.0 15.0 - 325.0 mg/dL " Final     Comment:     The random urine reference ranges provided were established   for 24 hour urine collections.  No reference ranges exist for  random urine specimens.  Correlate clinically.     01/25/2018 22.0 15.0 - 325.0 mg/dL Final     Comment:     The random urine reference ranges provided were established   for 24 hour urine collections.  No reference ranges exist for  random urine specimens.  Correlate clinically.     08/30/2017 22.0 15.0 - 325.0 mg/dL Final     Comment:     The random urine reference ranges provided were established   for 24 hour urine collections.  No reference ranges exist for  random urine specimens.  Correlate clinically.       Protein, Urine Random   Date Value Ref Range Status   05/19/2020 <7 0 - 15 mg/dL Final     Comment:     The random urine reference ranges provided were established   for 24 hour urine collections.  No reference ranges exist for  random urine specimens.  Correlate clinically.     01/25/2018 <7 0 - 15 mg/dL Final     Comment:     The random urine reference ranges provided were established   for 24 hour urine collections.  No reference ranges exist for  random urine specimens.  Correlate clinically.     08/30/2017 <7 0 - 15 mg/dL Final     Comment:     The random urine reference ranges provided were established   for 24 hour urine collections.  No reference ranges exist for  random urine specimens.  Correlate clinically.       Prot/Creat Ratio, Ur   Date Value Ref Range Status   05/19/2020 Unable to calculate 0.00 - 0.20 Final   01/25/2018 Unable to calculate 0.00 - 0.20 Final   08/30/2017 Unable to calculate 0.00 - 0.20 Final           ASSESSMENT AND PLAN:  29 y.o. year old female with history of PCKD, HTN, migraines, proteinuria presents to the renal clinic for evaluation of renal insufficiency.    1. PCKD: Patient's renal function has stabilized with creatinine at 1.4. Patient was advised to hydrate with 60-80 ounces of water per day. Proteinuria has resolved.  Patient's renal function will be monitored closely and she will return to the clinic in 3 months for follow up. Patient was advised to avoid NSAID pain medications such as advil, aleve, motrin, ibuprofen, naprosyn, meloxicam, diclofenac, mobic. Will get renal US to evaluate progress of her PCKD. In addition, will get MRA to check for cerebral aneurysms (she has FHx of aunt who passed away because of cerebral aneurysm).   Patient was introduced to Jynarque/tolvaptan as a treatment for PCKD but is currently not interested in staring this medication (she is considering to have another child).     2. Electrolytes: Within normal limits.    3. Acid base status: Borderline acidosis. Monitor.     4. Volume: Euvolemic.     5. Hypertension: Good BP control.     6. Medications: Reviewed. Continue ACE-I.

## 2020-05-27 RX ORDER — LABETALOL 100 MG/1
TABLET, FILM COATED ORAL
Qty: 120 TABLET | Refills: 9 | Status: SHIPPED | OUTPATIENT
Start: 2020-05-27 | End: 2021-03-08

## 2020-06-04 ENCOUNTER — TELEPHONE (OUTPATIENT)
Dept: NEPHROLOGY | Facility: CLINIC | Age: 30
End: 2020-06-04

## 2020-06-04 NOTE — TELEPHONE ENCOUNTER
----- Message from Raul Grier sent at 6/4/2020  9:21 AM CDT -----  Contact: pt  Pt Nathalie Roland    Pt wants to schedule the MRI     Pt can be reached at [phone]

## 2020-06-11 ENCOUNTER — HOSPITAL ENCOUNTER (OUTPATIENT)
Dept: RADIOLOGY | Facility: HOSPITAL | Age: 30
Discharge: HOME | End: 2020-06-11
Attending: INTERNAL MEDICINE
Payer: COMMERCIAL

## 2020-06-11 DIAGNOSIS — Q61.3 POLYCYSTIC KIDNEY DISEASE: ICD-10-CM

## 2020-06-11 DIAGNOSIS — I67.1 CEREBRAL ANEURYSM: ICD-10-CM

## 2020-06-11 PROCEDURE — 70544 MR ANGIOGRAPHY HEAD W/O DYE: CPT | Mod: 26,,, | Performed by: RADIOLOGY

## 2020-06-11 PROCEDURE — 70544 MR ANGIOGRAPHY HEAD W/O DYE: CPT | Mod: TC,PO

## 2020-06-11 PROCEDURE — 70544 MRA BRAIN WITHOUT CONTRAST: ICD-10-PCS | Mod: 26,,, | Performed by: RADIOLOGY

## 2020-08-14 ENCOUNTER — TELEPHONE (OUTPATIENT)
Dept: RADIOLOGY | Facility: HOSPITAL | Age: 30
End: 2020-08-14

## 2020-08-17 ENCOUNTER — HOSPITAL ENCOUNTER (OUTPATIENT)
Dept: RADIOLOGY | Facility: HOSPITAL | Age: 30
Discharge: HOME | End: 2020-08-17
Attending: INTERNAL MEDICINE
Payer: COMMERCIAL

## 2020-08-17 ENCOUNTER — TELEPHONE (OUTPATIENT)
Dept: NEPHROLOGY | Facility: CLINIC | Age: 30
End: 2020-08-17

## 2020-08-17 DIAGNOSIS — Q61.3 POLYCYSTIC KIDNEY DISEASE: ICD-10-CM

## 2020-08-17 PROCEDURE — 76770 US EXAM ABDO BACK WALL COMP: CPT | Mod: TC

## 2020-08-17 PROCEDURE — 76770 US EXAM ABDO BACK WALL COMP: CPT | Mod: 26,,, | Performed by: RADIOLOGY

## 2020-08-17 PROCEDURE — 76770 US RETROPERITONEAL COMPLETE: ICD-10-PCS | Mod: 26,,, | Performed by: RADIOLOGY

## 2020-08-17 NOTE — TELEPHONE ENCOUNTER
----- Message from Cornelia Cardenas sent at 8/17/2020  9:26 AM CDT -----  Regarding: change appt date  Type:  Sooner Apoointment Request    Caller is requesting a sooner appointment.  Caller declined first available appointment listed below.  Caller will not accept being placed on the waitlist and is requesting a message be sent to doctor.  Name of Caller:pt  When is the first available appointment?10/01/2020  Symptoms:n/a  Would the patient rather a call back or a response via [facility]? Call back.  Best Call Back Number:[phone]  Additional Information: please call back. Thanks

## 2020-08-19 ENCOUNTER — LAB VISIT (OUTPATIENT)
Dept: LAB | Facility: HOSPITAL | Age: 30
End: 2020-08-19
Attending: INTERNAL MEDICINE
Payer: COMMERCIAL

## 2020-08-19 DIAGNOSIS — N18.30 CKD (CHRONIC KIDNEY DISEASE) STAGE 3, GFR 30-59 ML/MIN: ICD-10-CM

## 2020-08-19 LAB
BACTERIA #/AREA URNS HPF: ABNORMAL /HPF
BILIRUB UR QL STRIP: NEGATIVE
CLARITY UR: CLEAR
COLOR UR: YELLOW
GLUCOSE UR QL STRIP: NEGATIVE
HGB UR QL STRIP: NEGATIVE
KETONES UR QL STRIP: NEGATIVE
LEUKOCYTE ESTERASE UR QL STRIP: ABNORMAL
MICROSCOPIC COMMENT: ABNORMAL
NITRITE UR QL STRIP: NEGATIVE
PH UR STRIP: 8 [PH] (ref 5–8)
PROT UR QL STRIP: NEGATIVE
RBC #/AREA URNS HPF: 3 /HPF (ref 0–4)
SP GR UR STRIP: 1.01 (ref 1–1.03)
SQUAMOUS #/AREA URNS HPF: 3 /HPF
URN SPEC COLLECT METH UR: ABNORMAL
WBC #/AREA URNS HPF: 7 /HPF (ref 0–5)

## 2020-08-19 PROCEDURE — 81000 URINALYSIS NONAUTO W/SCOPE: CPT

## 2020-09-25 ENCOUNTER — PATIENT MESSAGE (OUTPATIENT)
Dept: OTHER | Facility: OTHER | Age: 30
End: 2020-09-25

## 2020-09-28 ENCOUNTER — OFFICE VISIT (OUTPATIENT)
Dept: INTERNAL MEDICINE | Facility: CLINIC | Age: 30
End: 2020-09-28
Payer: COMMERCIAL

## 2020-09-28 ENCOUNTER — LAB VISIT (OUTPATIENT)
Dept: LAB | Facility: HOSPITAL | Age: 30
End: 2020-09-28
Attending: FAMILY MEDICINE
Payer: COMMERCIAL

## 2020-09-28 VITALS
DIASTOLIC BLOOD PRESSURE: 80 MMHG | BODY MASS INDEX: 28.79 KG/M2 | WEIGHT: 183.44 LBS | SYSTOLIC BLOOD PRESSURE: 120 MMHG | HEART RATE: 82 BPM | TEMPERATURE: 98 F | HEIGHT: 67 IN

## 2020-09-28 DIAGNOSIS — R25.3 MUSCLE TWITCHING: ICD-10-CM

## 2020-09-28 DIAGNOSIS — I12.9 HYPERTENSION, RENAL: ICD-10-CM

## 2020-09-28 DIAGNOSIS — M79.10 MYALGIA: ICD-10-CM

## 2020-09-28 DIAGNOSIS — Q61.3 POLYCYSTIC KIDNEY DISEASE: ICD-10-CM

## 2020-09-28 DIAGNOSIS — I12.9 HYPERTENSION, RENAL: Primary | ICD-10-CM

## 2020-09-28 DIAGNOSIS — F41.9 ANXIETY: ICD-10-CM

## 2020-09-28 LAB
BASOPHILS # BLD AUTO: 0.04 K/UL (ref 0–0.2)
BASOPHILS NFR BLD: 0.6 % (ref 0–1.9)
DIFFERENTIAL METHOD: ABNORMAL
EOSINOPHIL # BLD AUTO: 0.3 K/UL (ref 0–0.5)
EOSINOPHIL NFR BLD: 3.6 % (ref 0–8)
ERYTHROCYTE [DISTWIDTH] IN BLOOD BY AUTOMATED COUNT: 12.6 % (ref 11.5–14.5)
ERYTHROCYTE [SEDIMENTATION RATE] IN BLOOD BY WESTERGREN METHOD: 7 MM/HR (ref 0–20)
HCT VFR BLD AUTO: 37.9 % (ref 37–48.5)
HGB BLD-MCNC: 11.7 G/DL (ref 12–16)
IMM GRANULOCYTES # BLD AUTO: 0 K/UL (ref 0–0.04)
IMM GRANULOCYTES NFR BLD AUTO: 0 % (ref 0–0.5)
LYMPHOCYTES # BLD AUTO: 2.3 K/UL (ref 1–4.8)
LYMPHOCYTES NFR BLD: 31.2 % (ref 18–48)
MCH RBC QN AUTO: 29.8 PG (ref 27–31)
MCHC RBC AUTO-ENTMCNC: 30.9 G/DL (ref 32–36)
MCV RBC AUTO: 97 FL (ref 82–98)
MONOCYTES # BLD AUTO: 0.6 K/UL (ref 0.3–1)
MONOCYTES NFR BLD: 7.7 % (ref 4–15)
NEUTROPHILS # BLD AUTO: 4.1 K/UL (ref 1.8–7.7)
NEUTROPHILS NFR BLD: 56.9 % (ref 38–73)
NRBC BLD-RTO: 0 /100 WBC
PLATELET # BLD AUTO: 291 K/UL (ref 150–350)
PMV BLD AUTO: 10.7 FL (ref 9.2–12.9)
RBC # BLD AUTO: 3.92 M/UL (ref 4–5.4)
WBC # BLD AUTO: 7.27 K/UL (ref 3.9–12.7)

## 2020-09-28 PROCEDURE — 86431 RHEUMATOID FACTOR QUANT: CPT

## 2020-09-28 PROCEDURE — 86038 ANTINUCLEAR ANTIBODIES: CPT

## 2020-09-28 PROCEDURE — 99214 PR OFFICE/OUTPT VISIT, EST, LEVL IV, 30-39 MIN: ICD-10-PCS | Mod: S$GLB,,, | Performed by: FAMILY MEDICINE

## 2020-09-28 PROCEDURE — 99999 PR PBB SHADOW E&M-EST. PATIENT-LVL III: ICD-10-PCS | Mod: PBBFAC,,, | Performed by: FAMILY MEDICINE

## 2020-09-28 PROCEDURE — 3008F PR BODY MASS INDEX (BMI) DOCUMENTED: ICD-10-PCS | Mod: CPTII,S$GLB,, | Performed by: FAMILY MEDICINE

## 2020-09-28 PROCEDURE — 84443 ASSAY THYROID STIM HORMONE: CPT

## 2020-09-28 PROCEDURE — 85651 RBC SED RATE NONAUTOMATED: CPT

## 2020-09-28 PROCEDURE — 86140 C-REACTIVE PROTEIN: CPT

## 2020-09-28 PROCEDURE — 80053 COMPREHEN METABOLIC PANEL: CPT

## 2020-09-28 PROCEDURE — 99999 PR PBB SHADOW E&M-EST. PATIENT-LVL III: CPT | Mod: PBBFAC,,, | Performed by: FAMILY MEDICINE

## 2020-09-28 PROCEDURE — 99214 OFFICE O/P EST MOD 30 MIN: CPT | Mod: S$GLB,,, | Performed by: FAMILY MEDICINE

## 2020-09-28 PROCEDURE — 36415 COLL VENOUS BLD VENIPUNCTURE: CPT | Mod: PO

## 2020-09-28 PROCEDURE — 3008F BODY MASS INDEX DOCD: CPT | Mod: CPTII,S$GLB,, | Performed by: FAMILY MEDICINE

## 2020-09-28 PROCEDURE — 85025 COMPLETE CBC W/AUTO DIFF WBC: CPT

## 2020-09-28 NOTE — PROGRESS NOTES
Subjective:      Patient ID: Nathalie Roland is a 30 y.o. female.    Chief Complaint: Muscle Pain and Generalized Body Aches    HPI  31 yo with PKD, HTN, anxiety here today for a few issues.  She has been seeing Psych/therapist and is now on 15 mg of lexapro instead of zoloft.  She is stable but out of med past few days so anxiety is up and initial BP today was higher than usual.  Normal BP under 120/80s.    She has been having some pains in joints//wrists/ankle/knees off and on.  Maybe 1-2 times in a week.  This has been going on for some time now and she has noticed muscle twitching in her upper/lower ext.  They are random and can occur daily  Drinks around 80 oz of water.  Is on iron supplements.  Has not been associated with any medications that she has noticed.  No swelling in the joints  No muscle weakness    Past Medical History:   Diagnosis Date    Anemia     Anxiety     Hypertension     renal hypertension    Migraine     Polycystic kidney disease      Family History   Problem Relation Age of Onset    Polycystic kidney disease Mother     Hypertension Mother     Cancer Mother         thyroid cancer    Polycystic kidney disease Maternal Uncle     Hypertension Maternal Uncle     Polycystic kidney disease Maternal Grandfather     Hypertension Maternal Grandfather     Cancer Paternal Grandmother         breast cancer    Hypertension Paternal Grandmother     Heart failure Paternal Grandmother     Heart attack Paternal Grandfather     Hypertension Paternal Grandfather     No Known Problems Father     Polycystic kidney disease Maternal Aunt     Bipolar disorder Paternal Aunt      Past Surgical History:   Procedure Laterality Date     SECTION      eye cyst      HERNIA REPAIR      UMBILICAL HERNIA REPAIR       Social History     Tobacco Use    Smoking status: Never Smoker    Smokeless tobacco: Never Used   Substance Use Topics    Alcohol use: Yes     Alcohol/week: 0.0  "standard drinks     Frequency: Monthly or less     Drinks per session: 1 or 2     Binge frequency: Less than monthly     Comment: socially    Drug use: No       BP (!) 130/100   Pulse 82   Temp 98.2 °F (36.8 °C) (Temporal)   Ht 5' 7" (1.702 m)   Wt 83.2 kg (183 lb 6.8 oz)   BMI 28.73 kg/m²     Review of Systems   Constitutional: Negative for activity change and unexpected weight change.   HENT: Negative for hearing loss, rhinorrhea and trouble swallowing.    Eyes: Negative for discharge and visual disturbance.   Respiratory: Negative for chest tightness and wheezing.    Cardiovascular: Negative for chest pain and palpitations.   Gastrointestinal: Negative for blood in stool, constipation, diarrhea and vomiting.   Endocrine: Negative for polydipsia and polyuria.   Genitourinary: Positive for menstrual problem. Negative for difficulty urinating and hematuria.   Musculoskeletal: Positive for arthralgias. Negative for joint swelling and neck pain.   Neurological: Negative for weakness and headaches.   Psychiatric/Behavioral: Negative for confusion and dysphoric mood.       Objective:     Physical Exam  Vitals signs and nursing note reviewed.   Constitutional:       General: She is not in acute distress.     Appearance: Normal appearance. She is well-developed.   HENT:      Head: Normocephalic and atraumatic.   Eyes:      Conjunctiva/sclera: Conjunctivae normal.      Pupils: Pupils are equal, round, and reactive to light.   Neck:      Musculoskeletal: Normal range of motion and neck supple.      Thyroid: No thyromegaly.   Cardiovascular:      Rate and Rhythm: Normal rate and regular rhythm.      Heart sounds: Normal heart sounds. No murmur. No gallop.    Pulmonary:      Effort: Pulmonary effort is normal. No respiratory distress.      Breath sounds: Normal breath sounds. No wheezing or rales.   Abdominal:      General: Bowel sounds are normal. There is no distension.      Palpations: Abdomen is soft. "   Musculoskeletal:      Right lower leg: No edema.      Left lower leg: No edema.   Skin:     General: Skin is warm and dry.      Findings: No rash.   Neurological:      General: No focal deficit present.      Mental Status: She is alert and oriented to person, place, and time.      Cranial Nerves: No cranial nerve deficit.      Motor: No weakness.      Coordination: Coordination normal.      Gait: Gait normal.   Psychiatric:         Mood and Affect: Mood normal.         Behavior: Behavior normal.         Thought Content: Thought content normal.         Judgment: Judgment normal.         Lab Results   Component Value Date    WBC 6.57 05/19/2020    HGB 12.6 05/19/2020    HCT 40.1 05/19/2020     05/19/2020    CHOL 186 12/12/2013    TRIG 114 12/12/2013    HDL 52 12/12/2013    ALT 19 09/18/2019    AST 15 09/18/2019     08/19/2020    K 4.3 08/19/2020     08/19/2020    CREATININE 1.4 08/19/2020    BUN 13 08/19/2020    CO2 23 08/19/2020    TSH 1.716 08/27/2018       Assessment:     1. Hypertension, renal    2. Polycystic kidney disease    3. Anxiety    4. Myalgia    5. Muscle twitching         Plan:     Hypertension, renal  -     CBC auto differential; Future; Expected date: 09/28/2020  -     Comprehensive metabolic panel; Future; Expected date: 09/28/2020    Polycystic kidney disease    Anxiety    Myalgia  -     TSH; Future; Expected date: 09/28/2020  -     RADHA Screen w/Reflex; Future; Expected date: 09/28/2020  -     Sedimentation rate; Future; Expected date: 09/28/2020  -     C-reactive protein; Future; Expected date: 09/28/2020  -     Rheumatoid factor; Future; Expected date: 09/28/2020    Muscle twitching  -     TSH; Future; Expected date: 09/28/2020  -     RADHA Screen w/Reflex; Future; Expected date: 09/28/2020  -     Sedimentation rate; Future; Expected date: 09/28/2020  -     C-reactive protein; Future; Expected date: 09/28/2020  -     Rheumatoid factor; Future; Expected date: 09/28/2020      BP  generally very well controlled  Out of lexapro//so likely influencing the initial read  Will update some labs and look at some inflammatory markers  Given her CKD//avoid NSAIDs  Tylenol/topical creams for joint pain  Stay well hydrated  Start nightly dose of Mg supplement  Will send results and any further recs  F/u PRN

## 2020-09-29 LAB
ALBUMIN SERPL BCP-MCNC: 3.9 G/DL (ref 3.5–5.2)
ALP SERPL-CCNC: 48 U/L (ref 55–135)
ALT SERPL W/O P-5'-P-CCNC: 15 U/L (ref 10–44)
ANA SER QL IF: NORMAL
ANION GAP SERPL CALC-SCNC: 10 MMOL/L (ref 8–16)
AST SERPL-CCNC: 16 U/L (ref 10–40)
BILIRUB SERPL-MCNC: 0.6 MG/DL (ref 0.1–1)
BUN SERPL-MCNC: 14 MG/DL (ref 6–20)
CALCIUM SERPL-MCNC: 8.6 MG/DL (ref 8.7–10.5)
CHLORIDE SERPL-SCNC: 111 MMOL/L (ref 95–110)
CO2 SERPL-SCNC: 18 MMOL/L (ref 23–29)
CREAT SERPL-MCNC: 1.3 MG/DL (ref 0.5–1.4)
CRP SERPL-MCNC: 1.2 MG/L (ref 0–8.2)
EST. GFR  (AFRICAN AMERICAN): >60 ML/MIN/1.73 M^2
EST. GFR  (NON AFRICAN AMERICAN): 54.8 ML/MIN/1.73 M^2
GLUCOSE SERPL-MCNC: 86 MG/DL (ref 70–110)
POTASSIUM SERPL-SCNC: 4 MMOL/L (ref 3.5–5.1)
PROT SERPL-MCNC: 6.7 G/DL (ref 6–8.4)
RHEUMATOID FACT SERPL-ACNC: <10 IU/ML (ref 0–15)
SODIUM SERPL-SCNC: 139 MMOL/L (ref 136–145)
TSH SERPL DL<=0.005 MIU/L-ACNC: 2.55 UIU/ML (ref 0.4–4)

## 2020-10-19 ENCOUNTER — PATIENT OUTREACH (OUTPATIENT)
Dept: ADMINISTRATIVE | Facility: OTHER | Age: 30
End: 2020-10-19

## 2020-10-19 ENCOUNTER — OFFICE VISIT (OUTPATIENT)
Dept: NEPHROLOGY | Facility: CLINIC | Age: 30
End: 2020-10-19
Payer: COMMERCIAL

## 2020-10-19 VITALS
WEIGHT: 182.13 LBS | HEART RATE: 98 BPM | SYSTOLIC BLOOD PRESSURE: 106 MMHG | HEIGHT: 67 IN | DIASTOLIC BLOOD PRESSURE: 70 MMHG | BODY MASS INDEX: 28.58 KG/M2

## 2020-10-19 DIAGNOSIS — N18.30 STAGE 3 CHRONIC KIDNEY DISEASE, UNSPECIFIED WHETHER STAGE 3A OR 3B CKD: Primary | ICD-10-CM

## 2020-10-19 DIAGNOSIS — Q61.3 POLYCYSTIC KIDNEY DISEASE: ICD-10-CM

## 2020-10-19 PROCEDURE — 99214 OFFICE O/P EST MOD 30 MIN: CPT | Mod: S$GLB,,, | Performed by: INTERNAL MEDICINE

## 2020-10-19 PROCEDURE — 99999 PR PBB SHADOW E&M-EST. PATIENT-LVL III: CPT | Mod: PBBFAC,,, | Performed by: INTERNAL MEDICINE

## 2020-10-19 PROCEDURE — 3008F PR BODY MASS INDEX (BMI) DOCUMENTED: ICD-10-PCS | Mod: CPTII,S$GLB,, | Performed by: INTERNAL MEDICINE

## 2020-10-19 PROCEDURE — 3008F BODY MASS INDEX DOCD: CPT | Mod: CPTII,S$GLB,, | Performed by: INTERNAL MEDICINE

## 2020-10-19 PROCEDURE — 99999 PR PBB SHADOW E&M-EST. PATIENT-LVL III: ICD-10-PCS | Mod: PBBFAC,,, | Performed by: INTERNAL MEDICINE

## 2020-10-19 PROCEDURE — 99214 PR OFFICE/OUTPT VISIT, EST, LEVL IV, 30-39 MIN: ICD-10-PCS | Mod: S$GLB,,, | Performed by: INTERNAL MEDICINE

## 2020-10-19 RX ORDER — ESCITALOPRAM OXALATE 20 MG/1
1 TABLET ORAL DAILY
COMMUNITY
Start: 2020-10-01 | End: 2021-04-16 | Stop reason: SDUPTHER

## 2020-10-19 NOTE — PROGRESS NOTES
PROGRESS NOTE FOR ESTABLISHED PATIENT    REASON FOR VISIT: Polycystic kidney disease    30 y.o. female with history of PCKD, HTN, migraines, proteinuria presents to the renal clinic for evaluation of renal insufficiency.     2019:  Patient denies any complaints today. She is currently not interested in staring tolvaptan since she is considering another child.     2019:  Patient is feeling fine, no complaints.  She has not made a decision about tolvaptan since she is considering another child.     May 23, 2020:  Patient is feeling fine, Creatinine at 1.4 today. Patient is requesting repeat MRA since her aunt  from cerebral aneurysm.     2020:  Creatinine stable at 1.3.          Past Medical History:   Diagnosis Date    Anemia     Anxiety     Hypertension     renal hypertension    Migraine     Polycystic kidney disease        Past Surgical History:   Procedure Laterality Date     SECTION      eye cyst      HERNIA REPAIR      UMBILICAL HERNIA REPAIR         Review of patient's allergies indicates:  No Known Allergies    Current Outpatient Medications   Medication Sig Dispense Refill    enalapril (VASOTEC) 20 MG tablet Take 0.5 tablets (10 mg total) by mouth 2 (two) times daily. 60 tablet 5    escitalopram oxalate (LEXAPRO) 20 MG tablet Take 1 tablet by mouth once daily.      iron-vitamin C 100-250 mg, ICAR-C, 100-250 mg Tab Take 1 tablet by mouth once daily. 30 tablet 6    labetaloL (NORMODYNE) 100 MG tablet TAKE 1 TABLET BY MOUTH FOUR TIMES DAILY 120 tablet 9     No current facility-administered medications for this visit.        Family History   Problem Relation Age of Onset    Polycystic kidney disease Mother     Hypertension Mother     Cancer Mother         thyroid cancer    Polycystic kidney disease Maternal Uncle     Hypertension Maternal Uncle     Polycystic kidney disease Maternal Grandfather     Hypertension Maternal Grandfather     Cancer  Paternal Grandmother         breast cancer    Hypertension Paternal Grandmother     Heart failure Paternal Grandmother     Heart attack Paternal Grandfather     Hypertension Paternal Grandfather     No Known Problems Father     Polycystic kidney disease Maternal Aunt     Bipolar disorder Paternal Aunt        Social History     Socioeconomic History    Marital status:      Spouse name: Not on file    Number of children: 1    Years of education: Not on file    Highest education level: Not on file   Occupational History     Employer: [employer]    Social Needs    Financial resource strain: Not very hard    Food insecurity     Worry: Never true     Inability: Never true    Transportation needs     Medical: No     Non-medical: No   Tobacco Use    Smoking status: Never Smoker    Smokeless tobacco: Never Used   Substance and Sexual Activity    Alcohol use: Yes     Alcohol/week: 0.0 standard drinks     Frequency: Monthly or less     Drinks per session: 1 or 2     Binge frequency: Less than monthly     Comment: socially    Drug use: No    Sexual activity: Yes     Partners: Male     Birth control/protection: None   Lifestyle    Physical activity     Days per week: 3 days     Minutes per session: 40 min    Stress: To some extent   Relationships    Social connections     Talks on phone: Once a week     Gets together: Once a week     Attends Muslim service: Not on file     Active member of club or organization: No     Attends meetings of clubs or organizations: Never     Relationship status:    Other Topics Concern    Not on file   Social History Narrative    Not on file       Review of Systems:  1. GENERAL: patient denies any fever, weight changes, generalized weakness, dizziness.  2. HEENT: patient denies headaches, visual disturbances, swallowing problems, sinus pain, nasal congestion.  3. CARDIOVASCULAR: patient denies chest pain, palpitations.  4. PULMONARY: patient denies SOB, coughing,  "hemoptysis, wheezing.  5. GASTROINTESTINAL: patient denies abdominal pain, nausea, vomiting, diarrhea.  6. GENITOURINARY: patient denies dysuria, hematuria, hesitancy, frequency.  7. EXTREMITIES: patient denies LE edema, LE cramping.  8. DERMATOLOGY: patient denies rashes, ulcers.  9. NEURO: patient denies tremors, extremity weakness, extremity numbness/tingling.  10. MUSCULOSKELETAL: patient denies joint pain, joint swelling.  11. HEMATOLOGY: patient denies rectal or gum bleeding.  12: PSYCH: patient denies anxiety, depression.      PHYSICAL EXAM:  /70   Pulse 98   Ht 5' 7" (1.702 m)   Wt 82.6 kg (182 lb 1.6 oz)   BMI 28.52 kg/m²     GENERAL: Pleasant lady presents to clinic with non-labored breathing.  HEENT: PER, no nasal discharge, no icterus, no oral exudates, moist mucosal membranes.  NECK: no thyroid mass, no lymphadenopathy.  HEART: RRR S1/S2, no rubs, good peripheral pulses.  LUNGS: CTA bilaterally, no wheezing, breathing is nonlabored.  ABDOMEN: soft, nontender, not distended, bowel sounds are present, no abdominal hernia.  EXTREM: no LE edema.  SKIN: no rashes, skin is warm and dry.  NEURO: A & O x 3, no obvious focal signs.    LABORATORY RESULTS:    Lab Results   Component Value Date    CREATININE 1.3 09/28/2020    BUN 14 09/28/2020     09/28/2020    K 4.0 09/28/2020     (H) 09/28/2020    CO2 18 (L) 09/28/2020      Lab Results   Component Value Date    CALCIUM 8.6 (L) 09/28/2020    PHOS 2.9 08/19/2020     Lab Results   Component Value Date    ALBUMIN 3.9 09/28/2020     Lab Results   Component Value Date    WBC 7.27 09/28/2020    HGB 11.7 (L) 09/28/2020    HCT 37.9 09/28/2020    MCV 97 09/28/2020     09/28/2020     Protein Creatinine Ratios:    Creatinine, Random Ur   Date Value Ref Range Status   05/19/2020 19.0 15.0 - 325.0 mg/dL Final     Comment:     The random urine reference ranges provided were established   for 24 hour urine collections.  No reference ranges exist " for  random urine specimens.  Correlate clinically.     01/25/2018 22.0 15.0 - 325.0 mg/dL Final     Comment:     The random urine reference ranges provided were established   for 24 hour urine collections.  No reference ranges exist for  random urine specimens.  Correlate clinically.     08/30/2017 22.0 15.0 - 325.0 mg/dL Final     Comment:     The random urine reference ranges provided were established   for 24 hour urine collections.  No reference ranges exist for  random urine specimens.  Correlate clinically.       Protein, Urine Random   Date Value Ref Range Status   05/19/2020 <7 0 - 15 mg/dL Final     Comment:     The random urine reference ranges provided were established   for 24 hour urine collections.  No reference ranges exist for  random urine specimens.  Correlate clinically.     01/25/2018 <7 0 - 15 mg/dL Final     Comment:     The random urine reference ranges provided were established   for 24 hour urine collections.  No reference ranges exist for  random urine specimens.  Correlate clinically.     08/30/2017 <7 0 - 15 mg/dL Final     Comment:     The random urine reference ranges provided were established   for 24 hour urine collections.  No reference ranges exist for  random urine specimens.  Correlate clinically.       Prot/Creat Ratio, Ur   Date Value Ref Range Status   05/19/2020 Unable to calculate 0.00 - 0.20 Final   01/25/2018 Unable to calculate 0.00 - 0.20 Final   08/30/2017 Unable to calculate 0.00 - 0.20 Final           ASSESSMENT AND PLAN:  30 y.o. year old female with history of PCKD, HTN, migraines, proteinuria presents to the renal clinic for evaluation of renal insufficiency.    1. PCKD: Patient's renal function has stabilized with creatinine at 1.3. Patient was advised to hydrate with 60-80 ounces of water per day. No proteinuria/hematuria per urinalysis.  Patient's renal function will be monitored closely and she will return to the clinic in 6 months for follow up. Patient was  advised to avoid NSAID pain medications such as advil, aleve, motrin, ibuprofen, naprosyn, meloxicam, diclofenac, mobic. Renal US showed enlarged kidneys (18.5 cm/17/7 cm with bilateral cysts). Will repeat US in 12-18 months. MRA brain showed no evidence for cerebral aneurysms (she has FHx with aunt who passed away because of cerebral aneurysm).   Patient was introduced to Jynarque/tolvaptan as a treatment for PCKD but is currently not interested in staring this medication (she is considering to have another child).     2. Electrolytes: Within normal limits.    3. Acid base status: Acidosis. Monitor.     4. Volume: Euvolemic.     5. Hypertension: Good BP control.     6. Medications: Reviewed. Continue ACE-I.

## 2020-10-19 NOTE — PROGRESS NOTES
Health Maintenance Due   Topic Date Due    Hepatitis C Screening  1990    Influenza Vaccine (1) 08/01/2020     Updates were requested from care everywhere.  Chart was reviewed for overdue Proactive [facility] Encounters ([facility]) topics (CRS, Breast Cancer Screening, Eye exam)  Health Maintenance has been updated.  [facility] immunization registry triggered.  Immunizations were reconciled.

## 2021-01-06 ENCOUNTER — PATIENT MESSAGE (OUTPATIENT)
Dept: ADMINISTRATIVE | Facility: OTHER | Age: 31
End: 2021-01-06

## 2021-02-04 ENCOUNTER — HOSPITAL ENCOUNTER (OUTPATIENT)
Dept: CARDIOLOGY | Facility: HOSPITAL | Age: 31
Discharge: HOME | End: 2021-02-04
Attending: FAMILY MEDICINE
Payer: COMMERCIAL

## 2021-02-04 ENCOUNTER — LAB VISIT (OUTPATIENT)
Dept: LAB | Facility: HOSPITAL | Age: 31
End: 2021-02-04
Attending: FAMILY MEDICINE
Payer: COMMERCIAL

## 2021-02-04 ENCOUNTER — OFFICE VISIT (OUTPATIENT)
Dept: INTERNAL MEDICINE | Facility: CLINIC | Age: 31
End: 2021-02-04
Payer: COMMERCIAL

## 2021-02-04 VITALS
HEART RATE: 81 BPM | TEMPERATURE: 98 F | HEIGHT: 67 IN | DIASTOLIC BLOOD PRESSURE: 72 MMHG | SYSTOLIC BLOOD PRESSURE: 118 MMHG | WEIGHT: 184.94 LBS | BODY MASS INDEX: 29.03 KG/M2

## 2021-02-04 DIAGNOSIS — F41.9 ANXIETY: ICD-10-CM

## 2021-02-04 DIAGNOSIS — R00.2 PALPITATIONS: ICD-10-CM

## 2021-02-04 DIAGNOSIS — R00.2 PALPITATIONS: Primary | ICD-10-CM

## 2021-02-04 DIAGNOSIS — I12.9 HYPERTENSION, RENAL: ICD-10-CM

## 2021-02-04 LAB
BASOPHILS # BLD AUTO: 0.06 K/UL (ref 0–0.2)
BASOPHILS NFR BLD: 0.6 % (ref 0–1.9)
DIFFERENTIAL METHOD: ABNORMAL
EOSINOPHIL # BLD AUTO: 0.3 K/UL (ref 0–0.5)
EOSINOPHIL NFR BLD: 3.4 % (ref 0–8)
ERYTHROCYTE [DISTWIDTH] IN BLOOD BY AUTOMATED COUNT: 13.1 % (ref 11.5–14.5)
HCT VFR BLD AUTO: 38.7 % (ref 37–48.5)
HGB BLD-MCNC: 12.3 G/DL (ref 12–16)
IMM GRANULOCYTES # BLD AUTO: 0.08 K/UL (ref 0–0.04)
IMM GRANULOCYTES NFR BLD AUTO: 0.8 % (ref 0–0.5)
LYMPHOCYTES # BLD AUTO: 2.9 K/UL (ref 1–4.8)
LYMPHOCYTES NFR BLD: 29.8 % (ref 18–48)
MCH RBC QN AUTO: 29.9 PG (ref 27–31)
MCHC RBC AUTO-ENTMCNC: 31.8 G/DL (ref 32–36)
MCV RBC AUTO: 94 FL (ref 82–98)
MONOCYTES # BLD AUTO: 0.8 K/UL (ref 0.3–1)
MONOCYTES NFR BLD: 8.5 % (ref 4–15)
NEUTROPHILS # BLD AUTO: 5.5 K/UL (ref 1.8–7.7)
NEUTROPHILS NFR BLD: 56.9 % (ref 38–73)
NRBC BLD-RTO: 0 /100 WBC
PLATELET # BLD AUTO: 272 K/UL (ref 150–350)
PMV BLD AUTO: 11 FL (ref 9.2–12.9)
RBC # BLD AUTO: 4.12 M/UL (ref 4–5.4)
WBC # BLD AUTO: 9.71 K/UL (ref 3.9–12.7)

## 2021-02-04 PROCEDURE — 93005 ELECTROCARDIOGRAM TRACING: CPT | Mod: PO

## 2021-02-04 PROCEDURE — 85025 COMPLETE CBC W/AUTO DIFF WBC: CPT

## 2021-02-04 PROCEDURE — 80053 COMPREHEN METABOLIC PANEL: CPT

## 2021-02-04 PROCEDURE — 93010 EKG 12-LEAD: ICD-10-PCS | Mod: ,,, | Performed by: INTERNAL MEDICINE

## 2021-02-04 PROCEDURE — 3008F PR BODY MASS INDEX (BMI) DOCUMENTED: ICD-10-PCS | Mod: CPTII,S$GLB,, | Performed by: FAMILY MEDICINE

## 2021-02-04 PROCEDURE — 3008F BODY MASS INDEX DOCD: CPT | Mod: CPTII,S$GLB,, | Performed by: FAMILY MEDICINE

## 2021-02-04 PROCEDURE — 99214 PR OFFICE/OUTPT VISIT, EST, LEVL IV, 30-39 MIN: ICD-10-PCS | Mod: S$GLB,,, | Performed by: FAMILY MEDICINE

## 2021-02-04 PROCEDURE — 1126F PR PAIN SEVERITY QUANTIFIED, NO PAIN PRESENT: ICD-10-PCS | Mod: S$GLB,,, | Performed by: FAMILY MEDICINE

## 2021-02-04 PROCEDURE — 36415 COLL VENOUS BLD VENIPUNCTURE: CPT | Mod: PO

## 2021-02-04 PROCEDURE — 99999 PR PBB SHADOW E&M-EST. PATIENT-LVL IV: ICD-10-PCS | Mod: PBBFAC,,, | Performed by: FAMILY MEDICINE

## 2021-02-04 PROCEDURE — 1126F AMNT PAIN NOTED NONE PRSNT: CPT | Mod: S$GLB,,, | Performed by: FAMILY MEDICINE

## 2021-02-04 PROCEDURE — 93010 ELECTROCARDIOGRAM REPORT: CPT | Mod: ,,, | Performed by: INTERNAL MEDICINE

## 2021-02-04 PROCEDURE — 99999 PR PBB SHADOW E&M-EST. PATIENT-LVL IV: CPT | Mod: PBBFAC,,, | Performed by: FAMILY MEDICINE

## 2021-02-04 PROCEDURE — 99214 OFFICE O/P EST MOD 30 MIN: CPT | Mod: S$GLB,,, | Performed by: FAMILY MEDICINE

## 2021-02-04 RX ORDER — ESCITALOPRAM OXALATE 10 MG/1
10 TABLET ORAL DAILY
Qty: 30 TABLET | Refills: 0 | Status: SHIPPED | OUTPATIENT
Start: 2021-02-04 | End: 2021-03-11

## 2021-02-05 LAB
ALBUMIN SERPL BCP-MCNC: 3.9 G/DL (ref 3.5–5.2)
ALP SERPL-CCNC: 63 U/L (ref 55–135)
ALT SERPL W/O P-5'-P-CCNC: 23 U/L (ref 10–44)
ANION GAP SERPL CALC-SCNC: 11 MMOL/L (ref 8–16)
AST SERPL-CCNC: 16 U/L (ref 10–40)
BILIRUB SERPL-MCNC: 0.5 MG/DL (ref 0.1–1)
BUN SERPL-MCNC: 23 MG/DL (ref 6–20)
CALCIUM SERPL-MCNC: 8.6 MG/DL (ref 8.7–10.5)
CHLORIDE SERPL-SCNC: 108 MMOL/L (ref 95–110)
CO2 SERPL-SCNC: 20 MMOL/L (ref 23–29)
CREAT SERPL-MCNC: 1.5 MG/DL (ref 0.5–1.4)
EST. GFR  (AFRICAN AMERICAN): 53.1 ML/MIN/1.73 M^2
EST. GFR  (NON AFRICAN AMERICAN): 46.1 ML/MIN/1.73 M^2
GLUCOSE SERPL-MCNC: 93 MG/DL (ref 70–110)
POTASSIUM SERPL-SCNC: 4.3 MMOL/L (ref 3.5–5.1)
PROT SERPL-MCNC: 6.5 G/DL (ref 6–8.4)
SODIUM SERPL-SCNC: 139 MMOL/L (ref 136–145)

## 2021-02-08 ENCOUNTER — TELEPHONE (OUTPATIENT)
Dept: INTERNAL MEDICINE | Facility: CLINIC | Age: 31
End: 2021-02-08

## 2021-03-08 ENCOUNTER — OFFICE VISIT (OUTPATIENT)
Dept: DERMATOLOGY | Facility: CLINIC | Age: 31
End: 2021-03-08
Payer: COMMERCIAL

## 2021-03-08 ENCOUNTER — OFFICE VISIT (OUTPATIENT)
Dept: CARDIOLOGY | Facility: CLINIC | Age: 31
End: 2021-03-08
Payer: COMMERCIAL

## 2021-03-08 VITALS
DIASTOLIC BLOOD PRESSURE: 84 MMHG | RESPIRATION RATE: 16 BRPM | HEART RATE: 83 BPM | BODY MASS INDEX: 29.41 KG/M2 | WEIGHT: 187.38 LBS | HEIGHT: 67 IN | SYSTOLIC BLOOD PRESSURE: 142 MMHG | OXYGEN SATURATION: 99 %

## 2021-03-08 DIAGNOSIS — R00.0 TACHYCARDIA: ICD-10-CM

## 2021-03-08 DIAGNOSIS — F41.9 ANXIETY: ICD-10-CM

## 2021-03-08 DIAGNOSIS — L21.9 SEBORRHEIC DERMATITIS: Primary | ICD-10-CM

## 2021-03-08 DIAGNOSIS — Q61.3 POLYCYSTIC KIDNEY DISEASE: ICD-10-CM

## 2021-03-08 DIAGNOSIS — R00.2 PALPITATIONS: Primary | ICD-10-CM

## 2021-03-08 DIAGNOSIS — D22.9 MULTIPLE NEVI: ICD-10-CM

## 2021-03-08 DIAGNOSIS — I12.9 HYPERTENSION, RENAL: ICD-10-CM

## 2021-03-08 PROCEDURE — 99203 OFFICE O/P NEW LOW 30 MIN: CPT | Mod: S$GLB,,, | Performed by: DERMATOLOGY

## 2021-03-08 PROCEDURE — 1126F AMNT PAIN NOTED NONE PRSNT: CPT | Mod: S$GLB,,, | Performed by: DERMATOLOGY

## 2021-03-08 PROCEDURE — 99214 OFFICE O/P EST MOD 30 MIN: CPT | Mod: S$GLB,,, | Performed by: INTERNAL MEDICINE

## 2021-03-08 PROCEDURE — 1126F PR PAIN SEVERITY QUANTIFIED, NO PAIN PRESENT: ICD-10-PCS | Mod: S$GLB,,, | Performed by: INTERNAL MEDICINE

## 2021-03-08 PROCEDURE — 3008F BODY MASS INDEX DOCD: CPT | Mod: CPTII,S$GLB,, | Performed by: INTERNAL MEDICINE

## 2021-03-08 PROCEDURE — 1126F PR PAIN SEVERITY QUANTIFIED, NO PAIN PRESENT: ICD-10-PCS | Mod: S$GLB,,, | Performed by: DERMATOLOGY

## 2021-03-08 PROCEDURE — 1126F AMNT PAIN NOTED NONE PRSNT: CPT | Mod: S$GLB,,, | Performed by: INTERNAL MEDICINE

## 2021-03-08 PROCEDURE — 3008F PR BODY MASS INDEX (BMI) DOCUMENTED: ICD-10-PCS | Mod: CPTII,S$GLB,, | Performed by: INTERNAL MEDICINE

## 2021-03-08 PROCEDURE — 99214 PR OFFICE/OUTPT VISIT, EST, LEVL IV, 30-39 MIN: ICD-10-PCS | Mod: S$GLB,,, | Performed by: INTERNAL MEDICINE

## 2021-03-08 PROCEDURE — 99999 PR PBB SHADOW E&M-EST. PATIENT-LVL IV: ICD-10-PCS | Mod: PBBFAC,,, | Performed by: INTERNAL MEDICINE

## 2021-03-08 PROCEDURE — 99999 PR PBB SHADOW E&M-EST. PATIENT-LVL III: ICD-10-PCS | Mod: PBBFAC,,, | Performed by: DERMATOLOGY

## 2021-03-08 PROCEDURE — 99999 PR PBB SHADOW E&M-EST. PATIENT-LVL III: CPT | Mod: PBBFAC,,, | Performed by: DERMATOLOGY

## 2021-03-08 PROCEDURE — 99203 PR OFFICE/OUTPT VISIT, NEW, LEVL III, 30-44 MIN: ICD-10-PCS | Mod: S$GLB,,, | Performed by: DERMATOLOGY

## 2021-03-08 PROCEDURE — 99999 PR PBB SHADOW E&M-EST. PATIENT-LVL IV: CPT | Mod: PBBFAC,,, | Performed by: INTERNAL MEDICINE

## 2021-03-08 RX ORDER — KETOCONAZOLE 20 MG/ML
SHAMPOO, SUSPENSION TOPICAL
Qty: 120 ML | Refills: 5 | Status: SHIPPED | OUTPATIENT
Start: 2021-03-08 | End: 2022-09-28

## 2021-03-08 RX ORDER — CARVEDILOL 12.5 MG/1
12.5 TABLET ORAL 2 TIMES DAILY WITH MEALS
Qty: 60 TABLET | Refills: 3 | Status: SHIPPED | OUTPATIENT
Start: 2021-03-08 | End: 2021-04-26 | Stop reason: SDUPTHER

## 2021-03-18 ENCOUNTER — HOSPITAL ENCOUNTER (OUTPATIENT)
Dept: CARDIOLOGY | Facility: HOSPITAL | Age: 31
Discharge: HOME | End: 2021-03-18
Attending: INTERNAL MEDICINE
Payer: COMMERCIAL

## 2021-03-18 VITALS — HEIGHT: 67 IN | WEIGHT: 187 LBS | BODY MASS INDEX: 29.35 KG/M2

## 2021-03-18 DIAGNOSIS — I12.9 HYPERTENSION, RENAL: ICD-10-CM

## 2021-03-18 DIAGNOSIS — R00.2 PALPITATIONS: ICD-10-CM

## 2021-03-18 LAB
AORTIC ROOT ANNULUS: 2.51 CM
AV INDEX (PROSTH): 0.87
AV MEAN GRADIENT: 3 MMHG
AV PEAK GRADIENT: 6 MMHG
AV VALVE AREA: 2.69 CM2
AV VELOCITY RATIO: 0.93
BSA FOR ECHO PROCEDURE: 2 M2
CV ECHO LV RWT: 0.54 CM
DOP CALC AO PEAK VEL: 1.2 M/S
DOP CALC AO VTI: 29.05 CM
DOP CALC LVOT AREA: 3.1 CM2
DOP CALC LVOT DIAMETER: 1.98 CM
DOP CALC LVOT PEAK VEL: 1.11 M/S
DOP CALC LVOT STROKE VOLUME: 78.17 CM3
DOP CALC RVOT PEAK VEL: 0.7 M/S
DOP CALC RVOT VTI: 16.08 CM
DOP CALCLVOT PEAK VEL VTI: 25.4 CM
E WAVE DECELERATION TIME: 177.4 MSEC
E/A RATIO: 1.43
E/E' RATIO: 6.61 M/S
ECHO LV POSTERIOR WALL: 1.22 CM (ref 0.6–1.1)
FRACTIONAL SHORTENING: 31 % (ref 28–44)
INTERVENTRICULAR SEPTUM: 0.95 CM (ref 0.6–1.1)
IVRT: 82.78 MSEC
LA MAJOR: 4.59 CM
LA MINOR: 4.82 CM
LA WIDTH: 2.85 CM
LEFT ATRIUM SIZE: 3.42 CM
LEFT ATRIUM VOLUME INDEX: 19.8 ML/M2
LEFT ATRIUM VOLUME: 38.96 CM3
LEFT INTERNAL DIMENSION IN SYSTOLE: 3.13 CM (ref 2.1–4)
LEFT VENTRICLE DIASTOLIC VOLUME INDEX: 48.52 ML/M2
LEFT VENTRICLE DIASTOLIC VOLUME: 95.58 ML
LEFT VENTRICLE MASS INDEX: 89 G/M2
LEFT VENTRICLE SYSTOLIC VOLUME INDEX: 19.7 ML/M2
LEFT VENTRICLE SYSTOLIC VOLUME: 38.81 ML
LEFT VENTRICULAR INTERNAL DIMENSION IN DIASTOLE: 4.56 CM (ref 3.5–6)
LEFT VENTRICULAR MASS: 175.32 G
LV LATERAL E/E' RATIO: 5.74 M/S
LV SEPTAL E/E' RATIO: 7.79 M/S
MV A" WAVE DURATION": 11.42 MSEC
MV PEAK A VEL: 0.76 M/S
MV PEAK E VEL: 1.09 M/S
PISA TR MAX VEL: 2.97 M/S
PULM VEIN S/D RATIO: 1.13
PV MEAN GRADIENT: 1 MMHG
PV PEAK D VEL: 0.54 M/S
PV PEAK S VEL: 0.61 M/S
PV PEAK VELOCITY: 0.88 CM/S
RA MAJOR: 3.68 CM
RA PRESSURE: 3 MMHG
RA WIDTH: 2.83 CM
RIGHT VENTRICULAR END-DIASTOLIC DIMENSION: 2.31 CM
SINUS: 2.17 CM
STJ: 2.02 CM
TDI LATERAL: 0.19 M/S
TDI SEPTAL: 0.14 M/S
TDI: 0.17 M/S
TR MAX PG: 35 MMHG
TRICUSPID ANNULAR PLANE SYSTOLIC EXCURSION: 2.13 CM
TV REST PULMONARY ARTERY PRESSURE: 38 MMHG

## 2021-03-18 PROCEDURE — 93226 XTRNL ECG REC<48 HR SCAN A/R: CPT | Mod: PO

## 2021-03-18 PROCEDURE — 93306 TTE W/DOPPLER COMPLETE: CPT | Mod: PO

## 2021-03-18 PROCEDURE — 93227 XTRNL ECG REC<48 HR R&I: CPT | Mod: ,,, | Performed by: INTERNAL MEDICINE

## 2021-03-18 PROCEDURE — 93306 ECHO (CUPID ONLY): ICD-10-PCS | Mod: 26,,, | Performed by: INTERNAL MEDICINE

## 2021-03-18 PROCEDURE — 93227 HOLTER MONITOR - 48 HOUR (CUPID ONLY): ICD-10-PCS | Mod: ,,, | Performed by: INTERNAL MEDICINE

## 2021-03-18 PROCEDURE — 93306 TTE W/DOPPLER COMPLETE: CPT | Mod: 26,,, | Performed by: INTERNAL MEDICINE

## 2021-03-22 LAB
OHS CV EVENT MONITOR DAY: 0
OHS CV HOLTER LENGTH DECIMAL HOURS: 48
OHS CV HOLTER LENGTH HOURS: 48
OHS CV HOLTER LENGTH MINUTES: 0

## 2021-03-23 DIAGNOSIS — I10 HYPERTENSION, UNSPECIFIED TYPE: ICD-10-CM

## 2021-03-24 RX ORDER — ENALAPRIL MALEATE 20 MG/1
TABLET ORAL
Qty: 60 TABLET | Refills: 5 | Status: SHIPPED | OUTPATIENT
Start: 2021-03-24 | End: 2022-02-26 | Stop reason: SDUPTHER

## 2021-04-16 ENCOUNTER — PATIENT MESSAGE (OUTPATIENT)
Dept: INTERNAL MEDICINE | Facility: CLINIC | Age: 31
End: 2021-04-16

## 2021-04-16 RX ORDER — ESCITALOPRAM OXALATE 20 MG/1
20 TABLET ORAL DAILY
Qty: 30 TABLET | Refills: 0 | Status: SHIPPED | OUTPATIENT
Start: 2021-04-16 | End: 2021-05-14

## 2021-04-20 ENCOUNTER — OFFICE VISIT (OUTPATIENT)
Dept: OTOLARYNGOLOGY | Facility: CLINIC | Age: 31
End: 2021-04-20
Payer: COMMERCIAL

## 2021-04-20 ENCOUNTER — OFFICE VISIT (OUTPATIENT)
Dept: NEPHROLOGY | Facility: CLINIC | Age: 31
End: 2021-04-20
Payer: COMMERCIAL

## 2021-04-20 VITALS
DIASTOLIC BLOOD PRESSURE: 87 MMHG | WEIGHT: 188.25 LBS | HEART RATE: 100 BPM | BODY MASS INDEX: 29.55 KG/M2 | SYSTOLIC BLOOD PRESSURE: 127 MMHG | TEMPERATURE: 98 F | HEIGHT: 67 IN

## 2021-04-20 DIAGNOSIS — E55.9 VITAMIN D DEFICIENCY: ICD-10-CM

## 2021-04-20 DIAGNOSIS — N18.31 STAGE 3A CHRONIC KIDNEY DISEASE: ICD-10-CM

## 2021-04-20 DIAGNOSIS — H61.23 BILATERAL IMPACTED CERUMEN: Primary | ICD-10-CM

## 2021-04-20 DIAGNOSIS — Q61.3 POLYCYSTIC KIDNEY DISEASE: Primary | ICD-10-CM

## 2021-04-20 DIAGNOSIS — R73.9 HYPERGLYCEMIA: ICD-10-CM

## 2021-04-20 DIAGNOSIS — G43.809 OTHER MIGRAINE WITHOUT STATUS MIGRAINOSUS, NOT INTRACTABLE: ICD-10-CM

## 2021-04-20 DIAGNOSIS — I12.9 HYPERTENSION, RENAL: ICD-10-CM

## 2021-04-20 DIAGNOSIS — R80.1 PERSISTENT PROTEINURIA: ICD-10-CM

## 2021-04-20 PROCEDURE — 3008F PR BODY MASS INDEX (BMI) DOCUMENTED: ICD-10-PCS | Mod: CPTII,S$GLB,, | Performed by: PHYSICIAN ASSISTANT

## 2021-04-20 PROCEDURE — 99215 PR OFFICE/OUTPT VISIT, EST, LEVL V, 40-54 MIN: ICD-10-PCS | Mod: 95,,, | Performed by: INTERNAL MEDICINE

## 2021-04-20 PROCEDURE — 99999 PR PBB SHADOW E&M-EST. PATIENT-LVL III: CPT | Mod: PBBFAC,,, | Performed by: PHYSICIAN ASSISTANT

## 2021-04-20 PROCEDURE — 3008F BODY MASS INDEX DOCD: CPT | Mod: CPTII,S$GLB,, | Performed by: PHYSICIAN ASSISTANT

## 2021-04-20 PROCEDURE — 1125F PR PAIN SEVERITY QUANTIFIED, PAIN PRESENT: ICD-10-PCS | Mod: S$GLB,,, | Performed by: PHYSICIAN ASSISTANT

## 2021-04-20 PROCEDURE — 99999 PR PBB SHADOW E&M-EST. PATIENT-LVL III: ICD-10-PCS | Mod: PBBFAC,,, | Performed by: PHYSICIAN ASSISTANT

## 2021-04-20 PROCEDURE — 99499 NO LOS: ICD-10-PCS | Mod: S$GLB,,, | Performed by: PHYSICIAN ASSISTANT

## 2021-04-20 PROCEDURE — 1125F AMNT PAIN NOTED PAIN PRSNT: CPT | Mod: S$GLB,,, | Performed by: PHYSICIAN ASSISTANT

## 2021-04-20 PROCEDURE — 99215 OFFICE O/P EST HI 40 MIN: CPT | Mod: 95,,, | Performed by: INTERNAL MEDICINE

## 2021-04-20 PROCEDURE — 69210 REMOVE IMPACTED EAR WAX UNI: CPT | Mod: S$GLB,,, | Performed by: PHYSICIAN ASSISTANT

## 2021-04-20 PROCEDURE — 99499 UNLISTED E&M SERVICE: CPT | Mod: S$GLB,,, | Performed by: PHYSICIAN ASSISTANT

## 2021-04-20 PROCEDURE — 69210 PR REMOVAL IMPACTED CERUMEN REQUIRING INSTRUMENTATION, UNILATERAL: ICD-10-PCS | Mod: S$GLB,,, | Performed by: PHYSICIAN ASSISTANT

## 2021-04-20 RX ORDER — LABETALOL 100 MG/1
100 TABLET, FILM COATED ORAL 4 TIMES DAILY
COMMUNITY
Start: 2021-04-14 | End: 2021-04-20

## 2021-04-20 RX ORDER — TIZANIDINE 4 MG/1
TABLET ORAL
COMMUNITY
Start: 2021-03-23 | End: 2022-09-28

## 2021-04-20 RX ORDER — METHOCARBAMOL 750 MG/1
750 TABLET, FILM COATED ORAL 3 TIMES DAILY
COMMUNITY
Start: 2021-04-14 | End: 2022-09-28

## 2021-04-23 DIAGNOSIS — D62 ACUTE BLOOD LOSS ANEMIA: ICD-10-CM

## 2021-04-23 DIAGNOSIS — Q61.3 POLYCYSTIC KIDNEY DISEASE: ICD-10-CM

## 2021-04-23 DIAGNOSIS — R00.2 PALPITATIONS: ICD-10-CM

## 2021-04-23 DIAGNOSIS — I12.9 HYPERTENSION, RENAL: Primary | ICD-10-CM

## 2021-04-23 DIAGNOSIS — R00.0 TACHYCARDIA: ICD-10-CM

## 2021-04-26 ENCOUNTER — OFFICE VISIT (OUTPATIENT)
Dept: CARDIOLOGY | Facility: CLINIC | Age: 31
End: 2021-04-26
Payer: COMMERCIAL

## 2021-04-26 ENCOUNTER — HOSPITAL ENCOUNTER (OUTPATIENT)
Dept: CARDIOLOGY | Facility: HOSPITAL | Age: 31
Discharge: HOME | End: 2021-04-26
Attending: INTERNAL MEDICINE
Payer: COMMERCIAL

## 2021-04-26 VITALS
BODY MASS INDEX: 29.79 KG/M2 | HEART RATE: 75 BPM | WEIGHT: 189.81 LBS | DIASTOLIC BLOOD PRESSURE: 96 MMHG | OXYGEN SATURATION: 99 % | SYSTOLIC BLOOD PRESSURE: 130 MMHG | HEIGHT: 67 IN

## 2021-04-26 DIAGNOSIS — R00.2 PALPITATIONS: ICD-10-CM

## 2021-04-26 DIAGNOSIS — I12.9 HYPERTENSION, RENAL: Primary | ICD-10-CM

## 2021-04-26 DIAGNOSIS — R00.0 TACHYCARDIA: ICD-10-CM

## 2021-04-26 DIAGNOSIS — I12.9 HYPERTENSION, RENAL: ICD-10-CM

## 2021-04-26 DIAGNOSIS — F41.9 ANXIETY: ICD-10-CM

## 2021-04-26 DIAGNOSIS — D62 ACUTE BLOOD LOSS ANEMIA: ICD-10-CM

## 2021-04-26 DIAGNOSIS — Q61.3 POLYCYSTIC KIDNEY DISEASE: ICD-10-CM

## 2021-04-26 PROCEDURE — 3075F SYST BP GE 130 - 139MM HG: CPT | Mod: CPTII,S$GLB,, | Performed by: INTERNAL MEDICINE

## 2021-04-26 PROCEDURE — 93010 EKG 12-LEAD: ICD-10-PCS | Mod: ,,, | Performed by: INTERNAL MEDICINE

## 2021-04-26 PROCEDURE — 3080F DIAST BP >= 90 MM HG: CPT | Mod: CPTII,S$GLB,, | Performed by: INTERNAL MEDICINE

## 2021-04-26 PROCEDURE — 93010 ELECTROCARDIOGRAM REPORT: CPT | Mod: ,,, | Performed by: INTERNAL MEDICINE

## 2021-04-26 PROCEDURE — 99999 PR PBB SHADOW E&M-EST. PATIENT-LVL III: CPT | Mod: PBBFAC,,, | Performed by: INTERNAL MEDICINE

## 2021-04-26 PROCEDURE — 93005 ELECTROCARDIOGRAM TRACING: CPT | Mod: PO

## 2021-04-26 PROCEDURE — 1126F PR PAIN SEVERITY QUANTIFIED, NO PAIN PRESENT: ICD-10-PCS | Mod: S$GLB,,, | Performed by: INTERNAL MEDICINE

## 2021-04-26 PROCEDURE — 99214 PR OFFICE/OUTPT VISIT, EST, LEVL IV, 30-39 MIN: ICD-10-PCS | Mod: S$GLB,,, | Performed by: INTERNAL MEDICINE

## 2021-04-26 PROCEDURE — 3008F PR BODY MASS INDEX (BMI) DOCUMENTED: ICD-10-PCS | Mod: CPTII,S$GLB,, | Performed by: INTERNAL MEDICINE

## 2021-04-26 PROCEDURE — 3075F PR MOST RECENT SYSTOLIC BLOOD PRESS GE 130-139MM HG: ICD-10-PCS | Mod: CPTII,S$GLB,, | Performed by: INTERNAL MEDICINE

## 2021-04-26 PROCEDURE — 3080F PR MOST RECENT DIASTOLIC BLOOD PRESSURE >= 90 MM HG: ICD-10-PCS | Mod: CPTII,S$GLB,, | Performed by: INTERNAL MEDICINE

## 2021-04-26 PROCEDURE — 1126F AMNT PAIN NOTED NONE PRSNT: CPT | Mod: S$GLB,,, | Performed by: INTERNAL MEDICINE

## 2021-04-26 PROCEDURE — 99999 PR PBB SHADOW E&M-EST. PATIENT-LVL III: ICD-10-PCS | Mod: PBBFAC,,, | Performed by: INTERNAL MEDICINE

## 2021-04-26 PROCEDURE — 99214 OFFICE O/P EST MOD 30 MIN: CPT | Mod: S$GLB,,, | Performed by: INTERNAL MEDICINE

## 2021-04-26 PROCEDURE — 3008F BODY MASS INDEX DOCD: CPT | Mod: CPTII,S$GLB,, | Performed by: INTERNAL MEDICINE

## 2021-04-26 RX ORDER — CARVEDILOL 25 MG/1
25 TABLET ORAL 2 TIMES DAILY WITH MEALS
Qty: 60 TABLET | Refills: 3 | Status: SHIPPED | OUTPATIENT
Start: 2021-04-26 | End: 2021-08-02 | Stop reason: SDUPTHER

## 2021-05-23 PROBLEM — R80.9 PROTEINURIA: Status: ACTIVE | Noted: 2021-05-23

## 2021-05-23 PROBLEM — N18.30 CKD (CHRONIC KIDNEY DISEASE) STAGE 3, GFR 30-59 ML/MIN: Status: ACTIVE | Noted: 2021-05-23

## 2021-05-24 ENCOUNTER — TELEPHONE (OUTPATIENT)
Dept: NEPHROLOGY | Facility: CLINIC | Age: 31
End: 2021-05-24

## 2021-06-01 ENCOUNTER — OFFICE VISIT (OUTPATIENT)
Dept: INTERNAL MEDICINE | Facility: CLINIC | Age: 31
End: 2021-06-01
Payer: COMMERCIAL

## 2021-06-01 ENCOUNTER — PATIENT MESSAGE (OUTPATIENT)
Dept: INTERNAL MEDICINE | Facility: CLINIC | Age: 31
End: 2021-06-01

## 2021-06-01 DIAGNOSIS — I12.9 HYPERTENSION, RENAL: ICD-10-CM

## 2021-06-01 DIAGNOSIS — F41.9 ANXIETY: ICD-10-CM

## 2021-06-01 DIAGNOSIS — N18.31 STAGE 3A CHRONIC KIDNEY DISEASE: ICD-10-CM

## 2021-06-01 DIAGNOSIS — J06.9 VIRAL URI WITH COUGH: Primary | ICD-10-CM

## 2021-06-01 PROCEDURE — 99214 OFFICE O/P EST MOD 30 MIN: CPT | Mod: 95,,, | Performed by: NURSE PRACTITIONER

## 2021-06-01 PROCEDURE — 99214 PR OFFICE/OUTPT VISIT, EST, LEVL IV, 30-39 MIN: ICD-10-PCS | Mod: 95,,, | Performed by: NURSE PRACTITIONER

## 2021-06-01 RX ORDER — PROMETHAZINE HYDROCHLORIDE AND DEXTROMETHORPHAN HYDROBROMIDE 6.25; 15 MG/5ML; MG/5ML
5 SYRUP ORAL NIGHTLY PRN
Qty: 120 ML | Refills: 0 | Status: SHIPPED | OUTPATIENT
Start: 2021-06-01 | End: 2021-08-03

## 2021-06-01 RX ORDER — FLUTICASONE PROPIONATE 50 MCG
2 SPRAY, SUSPENSION (ML) NASAL DAILY
Qty: 16 G | Refills: 0 | Status: SHIPPED | OUTPATIENT
Start: 2021-06-01 | End: 2022-09-28

## 2021-06-01 RX ORDER — MONTELUKAST SODIUM 10 MG/1
10 TABLET ORAL NIGHTLY
Qty: 30 TABLET | Refills: 2 | Status: SHIPPED | OUTPATIENT
Start: 2021-06-01 | End: 2021-08-03

## 2021-06-23 ENCOUNTER — LAB VISIT (OUTPATIENT)
Dept: LAB | Facility: HOSPITAL | Age: 31
End: 2021-06-23
Attending: INTERNAL MEDICINE
Payer: COMMERCIAL

## 2021-06-23 DIAGNOSIS — Q61.3 POLYCYSTIC KIDNEY DISEASE: ICD-10-CM

## 2021-06-23 DIAGNOSIS — N18.31 STAGE 3A CHRONIC KIDNEY DISEASE: ICD-10-CM

## 2021-06-23 DIAGNOSIS — R73.9 HYPERGLYCEMIA: ICD-10-CM

## 2021-06-23 DIAGNOSIS — E55.9 VITAMIN D DEFICIENCY: ICD-10-CM

## 2021-06-23 LAB
25(OH)D3+25(OH)D2 SERPL-MCNC: 15 NG/ML (ref 30–96)
ALBUMIN SERPL BCP-MCNC: 3.8 G/DL (ref 3.5–5.2)
ANION GAP SERPL CALC-SCNC: 10 MMOL/L (ref 8–16)
BASOPHILS # BLD AUTO: 0.04 K/UL (ref 0–0.2)
BASOPHILS NFR BLD: 0.6 % (ref 0–1.9)
BUN SERPL-MCNC: 15 MG/DL (ref 6–20)
CALCIUM SERPL-MCNC: 9.3 MG/DL (ref 8.7–10.5)
CHLORIDE SERPL-SCNC: 104 MMOL/L (ref 95–110)
CO2 SERPL-SCNC: 19 MMOL/L (ref 23–29)
CREAT SERPL-MCNC: 1.6 MG/DL (ref 0.5–1.4)
DIFFERENTIAL METHOD: ABNORMAL
EOSINOPHIL # BLD AUTO: 0.2 K/UL (ref 0–0.5)
EOSINOPHIL NFR BLD: 3.3 % (ref 0–8)
ERYTHROCYTE [DISTWIDTH] IN BLOOD BY AUTOMATED COUNT: 12.9 % (ref 11.5–14.5)
EST. GFR  (AFRICAN AMERICAN): 49.1 ML/MIN/1.73 M^2
EST. GFR  (NON AFRICAN AMERICAN): 42.6 ML/MIN/1.73 M^2
ESTIMATED AVG GLUCOSE: 97 MG/DL (ref 68–131)
GLUCOSE SERPL-MCNC: 94 MG/DL (ref 70–110)
HBA1C MFR BLD: 5 % (ref 4–5.6)
HCT VFR BLD AUTO: 35.7 % (ref 37–48.5)
HGB BLD-MCNC: 11.5 G/DL (ref 12–16)
IMM GRANULOCYTES # BLD AUTO: 0.02 K/UL (ref 0–0.04)
IMM GRANULOCYTES NFR BLD AUTO: 0.3 % (ref 0–0.5)
LYMPHOCYTES # BLD AUTO: 2.7 K/UL (ref 1–4.8)
LYMPHOCYTES NFR BLD: 36.8 % (ref 18–48)
MCH RBC QN AUTO: 30.2 PG (ref 27–31)
MCHC RBC AUTO-ENTMCNC: 32.2 G/DL (ref 32–36)
MCV RBC AUTO: 94 FL (ref 82–98)
MONOCYTES # BLD AUTO: 0.7 K/UL (ref 0.3–1)
MONOCYTES NFR BLD: 9.2 % (ref 4–15)
NEUTROPHILS # BLD AUTO: 3.6 K/UL (ref 1.8–7.7)
NEUTROPHILS NFR BLD: 49.8 % (ref 38–73)
NRBC BLD-RTO: 0 /100 WBC
PHOSPHATE SERPL-MCNC: 3.7 MG/DL (ref 2.7–4.5)
PLATELET # BLD AUTO: 280 K/UL (ref 150–450)
PMV BLD AUTO: 10.8 FL (ref 9.2–12.9)
POTASSIUM SERPL-SCNC: 4.5 MMOL/L (ref 3.5–5.1)
PTH-INTACT SERPL-MCNC: 133 PG/ML (ref 9–77)
RBC # BLD AUTO: 3.81 M/UL (ref 4–5.4)
SODIUM SERPL-SCNC: 133 MMOL/L (ref 136–145)
URATE SERPL-MCNC: 5.6 MG/DL (ref 2.4–5.7)
WBC # BLD AUTO: 7.21 K/UL (ref 3.9–12.7)

## 2021-06-23 PROCEDURE — 84550 ASSAY OF BLOOD/URIC ACID: CPT | Performed by: INTERNAL MEDICINE

## 2021-06-23 PROCEDURE — 83036 HEMOGLOBIN GLYCOSYLATED A1C: CPT | Performed by: INTERNAL MEDICINE

## 2021-06-23 PROCEDURE — 82306 VITAMIN D 25 HYDROXY: CPT | Performed by: INTERNAL MEDICINE

## 2021-06-23 PROCEDURE — 83970 ASSAY OF PARATHORMONE: CPT | Performed by: INTERNAL MEDICINE

## 2021-06-23 PROCEDURE — 36415 COLL VENOUS BLD VENIPUNCTURE: CPT | Mod: PO | Performed by: INTERNAL MEDICINE

## 2021-06-23 PROCEDURE — 80069 RENAL FUNCTION PANEL: CPT | Performed by: INTERNAL MEDICINE

## 2021-06-23 PROCEDURE — 85025 COMPLETE CBC W/AUTO DIFF WBC: CPT | Performed by: INTERNAL MEDICINE

## 2021-08-02 ENCOUNTER — OFFICE VISIT (OUTPATIENT)
Dept: CARDIOLOGY | Facility: CLINIC | Age: 31
End: 2021-08-02
Payer: COMMERCIAL

## 2021-08-02 VITALS
OXYGEN SATURATION: 99 % | WEIGHT: 196.19 LBS | SYSTOLIC BLOOD PRESSURE: 118 MMHG | HEART RATE: 78 BPM | DIASTOLIC BLOOD PRESSURE: 78 MMHG | BODY MASS INDEX: 30.73 KG/M2

## 2021-08-02 DIAGNOSIS — F41.9 ANXIETY: ICD-10-CM

## 2021-08-02 DIAGNOSIS — G43.809 OTHER MIGRAINE WITHOUT STATUS MIGRAINOSUS, NOT INTRACTABLE: ICD-10-CM

## 2021-08-02 DIAGNOSIS — R00.0 TACHYCARDIA: ICD-10-CM

## 2021-08-02 DIAGNOSIS — I12.9 HYPERTENSION, RENAL: ICD-10-CM

## 2021-08-02 DIAGNOSIS — N18.31 STAGE 3A CHRONIC KIDNEY DISEASE: ICD-10-CM

## 2021-08-02 DIAGNOSIS — Q61.3 POLYCYSTIC KIDNEY DISEASE: ICD-10-CM

## 2021-08-02 DIAGNOSIS — R00.2 PALPITATIONS: Primary | ICD-10-CM

## 2021-08-02 PROCEDURE — 1159F MED LIST DOCD IN RCRD: CPT | Mod: CPTII,S$GLB,, | Performed by: INTERNAL MEDICINE

## 2021-08-02 PROCEDURE — 3008F PR BODY MASS INDEX (BMI) DOCUMENTED: ICD-10-PCS | Mod: CPTII,S$GLB,, | Performed by: INTERNAL MEDICINE

## 2021-08-02 PROCEDURE — 3078F DIAST BP <80 MM HG: CPT | Mod: CPTII,S$GLB,, | Performed by: INTERNAL MEDICINE

## 2021-08-02 PROCEDURE — 3044F HG A1C LEVEL LT 7.0%: CPT | Mod: CPTII,S$GLB,, | Performed by: INTERNAL MEDICINE

## 2021-08-02 PROCEDURE — 1159F PR MEDICATION LIST DOCUMENTED IN MEDICAL RECORD: ICD-10-PCS | Mod: CPTII,S$GLB,, | Performed by: INTERNAL MEDICINE

## 2021-08-02 PROCEDURE — 1160F RVW MEDS BY RX/DR IN RCRD: CPT | Mod: CPTII,S$GLB,, | Performed by: INTERNAL MEDICINE

## 2021-08-02 PROCEDURE — 3074F SYST BP LT 130 MM HG: CPT | Mod: CPTII,S$GLB,, | Performed by: INTERNAL MEDICINE

## 2021-08-02 PROCEDURE — 3044F PR MOST RECENT HEMOGLOBIN A1C LEVEL <7.0%: ICD-10-PCS | Mod: CPTII,S$GLB,, | Performed by: INTERNAL MEDICINE

## 2021-08-02 PROCEDURE — 1160F PR REVIEW ALL MEDS BY PRESCRIBER/CLIN PHARMACIST DOCUMENTED: ICD-10-PCS | Mod: CPTII,S$GLB,, | Performed by: INTERNAL MEDICINE

## 2021-08-02 PROCEDURE — 3074F PR MOST RECENT SYSTOLIC BLOOD PRESSURE < 130 MM HG: ICD-10-PCS | Mod: CPTII,S$GLB,, | Performed by: INTERNAL MEDICINE

## 2021-08-02 PROCEDURE — 99999 PR PBB SHADOW E&M-EST. PATIENT-LVL III: ICD-10-PCS | Mod: PBBFAC,,, | Performed by: INTERNAL MEDICINE

## 2021-08-02 PROCEDURE — 99214 OFFICE O/P EST MOD 30 MIN: CPT | Mod: S$GLB,,, | Performed by: INTERNAL MEDICINE

## 2021-08-02 PROCEDURE — 3008F BODY MASS INDEX DOCD: CPT | Mod: CPTII,S$GLB,, | Performed by: INTERNAL MEDICINE

## 2021-08-02 PROCEDURE — 99999 PR PBB SHADOW E&M-EST. PATIENT-LVL III: CPT | Mod: PBBFAC,,, | Performed by: INTERNAL MEDICINE

## 2021-08-02 PROCEDURE — 3078F PR MOST RECENT DIASTOLIC BLOOD PRESSURE < 80 MM HG: ICD-10-PCS | Mod: CPTII,S$GLB,, | Performed by: INTERNAL MEDICINE

## 2021-08-02 PROCEDURE — 99214 PR OFFICE/OUTPT VISIT, EST, LEVL IV, 30-39 MIN: ICD-10-PCS | Mod: S$GLB,,, | Performed by: INTERNAL MEDICINE

## 2021-08-02 RX ORDER — CARVEDILOL 25 MG/1
25 TABLET ORAL 2 TIMES DAILY WITH MEALS
Qty: 180 TABLET | Refills: 1 | Status: SHIPPED | OUTPATIENT
Start: 2021-08-02 | End: 2021-11-01 | Stop reason: SDUPTHER

## 2021-08-03 ENCOUNTER — OFFICE VISIT (OUTPATIENT)
Dept: NEPHROLOGY | Facility: CLINIC | Age: 31
End: 2021-08-03
Payer: COMMERCIAL

## 2021-08-03 DIAGNOSIS — I12.9 HYPERTENSION, RENAL: ICD-10-CM

## 2021-08-03 DIAGNOSIS — E87.20 METABOLIC ACIDOSIS: ICD-10-CM

## 2021-08-03 DIAGNOSIS — Q61.3 POLYCYSTIC KIDNEY DISEASE: ICD-10-CM

## 2021-08-03 DIAGNOSIS — N25.81 SECONDARY HYPERPARATHYROIDISM OF RENAL ORIGIN: ICD-10-CM

## 2021-08-03 DIAGNOSIS — E55.9 VITAMIN D DEFICIENCY: ICD-10-CM

## 2021-08-03 DIAGNOSIS — R80.1 PERSISTENT PROTEINURIA: ICD-10-CM

## 2021-08-03 DIAGNOSIS — R00.2 PALPITATIONS: ICD-10-CM

## 2021-08-03 DIAGNOSIS — N18.31 STAGE 3A CHRONIC KIDNEY DISEASE: Primary | ICD-10-CM

## 2021-08-03 DIAGNOSIS — G43.809 OTHER MIGRAINE WITHOUT STATUS MIGRAINOSUS, NOT INTRACTABLE: ICD-10-CM

## 2021-08-03 PROCEDURE — 3044F HG A1C LEVEL LT 7.0%: CPT | Mod: CPTII,,, | Performed by: INTERNAL MEDICINE

## 2021-08-03 PROCEDURE — 99215 PR OFFICE/OUTPT VISIT, EST, LEVL V, 40-54 MIN: ICD-10-PCS | Mod: 95,,, | Performed by: INTERNAL MEDICINE

## 2021-08-03 PROCEDURE — 99215 OFFICE O/P EST HI 40 MIN: CPT | Mod: 95,,, | Performed by: INTERNAL MEDICINE

## 2021-08-03 PROCEDURE — 3044F PR MOST RECENT HEMOGLOBIN A1C LEVEL <7.0%: ICD-10-PCS | Mod: CPTII,,, | Performed by: INTERNAL MEDICINE

## 2021-08-03 RX ORDER — SODIUM BICARBONATE 650 MG/1
650 TABLET ORAL 2 TIMES DAILY
Qty: 180 TABLET | Refills: 1 | Status: SHIPPED | OUTPATIENT
Start: 2021-08-03 | End: 2022-02-01

## 2021-08-05 ENCOUNTER — OFFICE VISIT (OUTPATIENT)
Dept: OBSTETRICS AND GYNECOLOGY | Facility: CLINIC | Age: 31
End: 2021-08-05
Payer: COMMERCIAL

## 2021-08-05 VITALS
DIASTOLIC BLOOD PRESSURE: 70 MMHG | WEIGHT: 193.13 LBS | BODY MASS INDEX: 30.31 KG/M2 | HEIGHT: 67 IN | SYSTOLIC BLOOD PRESSURE: 128 MMHG

## 2021-08-05 DIAGNOSIS — Z01.419 WELL WOMAN EXAM WITH ROUTINE GYNECOLOGICAL EXAM: Primary | ICD-10-CM

## 2021-08-05 PROCEDURE — 3008F PR BODY MASS INDEX (BMI) DOCUMENTED: ICD-10-PCS | Mod: CPTII,S$GLB,, | Performed by: OBSTETRICS & GYNECOLOGY

## 2021-08-05 PROCEDURE — 1159F MED LIST DOCD IN RCRD: CPT | Mod: CPTII,S$GLB,, | Performed by: OBSTETRICS & GYNECOLOGY

## 2021-08-05 PROCEDURE — 99999 PR PBB SHADOW E&M-EST. PATIENT-LVL III: CPT | Mod: PBBFAC,,, | Performed by: OBSTETRICS & GYNECOLOGY

## 2021-08-05 PROCEDURE — 3044F PR MOST RECENT HEMOGLOBIN A1C LEVEL <7.0%: ICD-10-PCS | Mod: CPTII,S$GLB,, | Performed by: OBSTETRICS & GYNECOLOGY

## 2021-08-05 PROCEDURE — 3074F PR MOST RECENT SYSTOLIC BLOOD PRESSURE < 130 MM HG: ICD-10-PCS | Mod: CPTII,S$GLB,, | Performed by: OBSTETRICS & GYNECOLOGY

## 2021-08-05 PROCEDURE — 99385 PR PREVENTIVE VISIT,NEW,18-39: ICD-10-PCS | Mod: S$GLB,,, | Performed by: OBSTETRICS & GYNECOLOGY

## 2021-08-05 PROCEDURE — 99385 PREV VISIT NEW AGE 18-39: CPT | Mod: S$GLB,,, | Performed by: OBSTETRICS & GYNECOLOGY

## 2021-08-05 PROCEDURE — 3078F DIAST BP <80 MM HG: CPT | Mod: CPTII,S$GLB,, | Performed by: OBSTETRICS & GYNECOLOGY

## 2021-08-05 PROCEDURE — 88175 CYTOPATH C/V AUTO FLUID REDO: CPT | Performed by: OBSTETRICS & GYNECOLOGY

## 2021-08-05 PROCEDURE — 3078F PR MOST RECENT DIASTOLIC BLOOD PRESSURE < 80 MM HG: ICD-10-PCS | Mod: CPTII,S$GLB,, | Performed by: OBSTETRICS & GYNECOLOGY

## 2021-08-05 PROCEDURE — 3044F HG A1C LEVEL LT 7.0%: CPT | Mod: CPTII,S$GLB,, | Performed by: OBSTETRICS & GYNECOLOGY

## 2021-08-05 PROCEDURE — 3074F SYST BP LT 130 MM HG: CPT | Mod: CPTII,S$GLB,, | Performed by: OBSTETRICS & GYNECOLOGY

## 2021-08-05 PROCEDURE — 99999 PR PBB SHADOW E&M-EST. PATIENT-LVL III: ICD-10-PCS | Mod: PBBFAC,,, | Performed by: OBSTETRICS & GYNECOLOGY

## 2021-08-05 PROCEDURE — 1159F PR MEDICATION LIST DOCUMENTED IN MEDICAL RECORD: ICD-10-PCS | Mod: CPTII,S$GLB,, | Performed by: OBSTETRICS & GYNECOLOGY

## 2021-08-05 PROCEDURE — 87624 HPV HI-RISK TYP POOLED RSLT: CPT | Performed by: OBSTETRICS & GYNECOLOGY

## 2021-08-05 PROCEDURE — 3008F BODY MASS INDEX DOCD: CPT | Mod: CPTII,S$GLB,, | Performed by: OBSTETRICS & GYNECOLOGY

## 2021-09-27 ENCOUNTER — PATIENT MESSAGE (OUTPATIENT)
Dept: CARDIOLOGY | Facility: CLINIC | Age: 31
End: 2021-09-27

## 2021-10-06 ENCOUNTER — LAB VISIT (OUTPATIENT)
Dept: LAB | Facility: HOSPITAL | Age: 31
End: 2021-10-06
Attending: INTERNAL MEDICINE
Payer: COMMERCIAL

## 2021-10-06 DIAGNOSIS — N18.31 STAGE 3A CHRONIC KIDNEY DISEASE: ICD-10-CM

## 2021-10-06 LAB
ALBUMIN SERPL BCP-MCNC: 3.8 G/DL (ref 3.5–5.2)
ANION GAP SERPL CALC-SCNC: 10 MMOL/L (ref 8–16)
BUN SERPL-MCNC: 13 MG/DL (ref 6–20)
CALCIUM SERPL-MCNC: 9.2 MG/DL (ref 8.7–10.5)
CHLORIDE SERPL-SCNC: 105 MMOL/L (ref 95–110)
CO2 SERPL-SCNC: 21 MMOL/L (ref 23–29)
CREAT SERPL-MCNC: 1.5 MG/DL (ref 0.5–1.4)
EST. GFR  (AFRICAN AMERICAN): 52.8 ML/MIN/1.73 M^2
EST. GFR  (NON AFRICAN AMERICAN): 45.8 ML/MIN/1.73 M^2
GLUCOSE SERPL-MCNC: 110 MG/DL (ref 70–110)
PHOSPHATE SERPL-MCNC: 3.4 MG/DL (ref 2.7–4.5)
POTASSIUM SERPL-SCNC: 4.5 MMOL/L (ref 3.5–5.1)
PTH-INTACT SERPL-MCNC: 92.4 PG/ML (ref 9–77)
SODIUM SERPL-SCNC: 136 MMOL/L (ref 136–145)

## 2021-10-06 PROCEDURE — 80069 RENAL FUNCTION PANEL: CPT | Performed by: INTERNAL MEDICINE

## 2021-10-06 PROCEDURE — 36415 COLL VENOUS BLD VENIPUNCTURE: CPT | Mod: PO | Performed by: INTERNAL MEDICINE

## 2021-10-06 PROCEDURE — 83970 ASSAY OF PARATHORMONE: CPT | Performed by: INTERNAL MEDICINE

## 2021-10-15 DIAGNOSIS — I12.9 HYPERTENSION, RENAL: ICD-10-CM

## 2021-10-15 DIAGNOSIS — R00.0 TACHYCARDIA: ICD-10-CM

## 2021-10-15 DIAGNOSIS — R00.2 PALPITATIONS: Primary | ICD-10-CM

## 2021-11-01 ENCOUNTER — HOSPITAL ENCOUNTER (OUTPATIENT)
Dept: CARDIOLOGY | Facility: HOSPITAL | Age: 31
Discharge: HOME | End: 2021-11-01
Attending: INTERNAL MEDICINE
Payer: COMMERCIAL

## 2021-11-01 ENCOUNTER — OFFICE VISIT (OUTPATIENT)
Dept: CARDIOLOGY | Facility: CLINIC | Age: 31
End: 2021-11-01
Payer: COMMERCIAL

## 2021-11-01 VITALS
OXYGEN SATURATION: 99 % | HEART RATE: 78 BPM | WEIGHT: 187.81 LBS | DIASTOLIC BLOOD PRESSURE: 86 MMHG | HEIGHT: 67 IN | SYSTOLIC BLOOD PRESSURE: 132 MMHG | BODY MASS INDEX: 29.48 KG/M2

## 2021-11-01 DIAGNOSIS — R00.2 PALPITATIONS: Primary | ICD-10-CM

## 2021-11-01 DIAGNOSIS — I12.9 HYPERTENSION, RENAL: ICD-10-CM

## 2021-11-01 DIAGNOSIS — Q61.3 POLYCYSTIC KIDNEY DISEASE: ICD-10-CM

## 2021-11-01 DIAGNOSIS — G43.809 OTHER MIGRAINE WITHOUT STATUS MIGRAINOSUS, NOT INTRACTABLE: ICD-10-CM

## 2021-11-01 DIAGNOSIS — I10 HYPERTENSION, UNSPECIFIED TYPE: ICD-10-CM

## 2021-11-01 DIAGNOSIS — R00.0 TACHYCARDIA: ICD-10-CM

## 2021-11-01 DIAGNOSIS — F41.9 ANXIETY: ICD-10-CM

## 2021-11-01 DIAGNOSIS — R00.2 PALPITATIONS: ICD-10-CM

## 2021-11-01 DIAGNOSIS — N18.31 STAGE 3A CHRONIC KIDNEY DISEASE: ICD-10-CM

## 2021-11-01 PROCEDURE — 3066F PR DOCUMENTATION OF TREATMENT FOR NEPHROPATHY: ICD-10-PCS | Mod: CPTII,S$GLB,, | Performed by: INTERNAL MEDICINE

## 2021-11-01 PROCEDURE — 1159F PR MEDICATION LIST DOCUMENTED IN MEDICAL RECORD: ICD-10-PCS | Mod: CPTII,S$GLB,, | Performed by: INTERNAL MEDICINE

## 2021-11-01 PROCEDURE — 1160F PR REVIEW ALL MEDS BY PRESCRIBER/CLIN PHARMACIST DOCUMENTED: ICD-10-PCS | Mod: CPTII,S$GLB,, | Performed by: INTERNAL MEDICINE

## 2021-11-01 PROCEDURE — 93005 ELECTROCARDIOGRAM TRACING: CPT | Mod: PO

## 2021-11-01 PROCEDURE — 99214 OFFICE O/P EST MOD 30 MIN: CPT | Mod: S$GLB,,, | Performed by: INTERNAL MEDICINE

## 2021-11-01 PROCEDURE — 3008F BODY MASS INDEX DOCD: CPT | Mod: CPTII,S$GLB,, | Performed by: INTERNAL MEDICINE

## 2021-11-01 PROCEDURE — 4010F ACE/ARB THERAPY RXD/TAKEN: CPT | Mod: CPTII,S$GLB,, | Performed by: INTERNAL MEDICINE

## 2021-11-01 PROCEDURE — 1159F MED LIST DOCD IN RCRD: CPT | Mod: CPTII,S$GLB,, | Performed by: INTERNAL MEDICINE

## 2021-11-01 PROCEDURE — 4010F PR ACE/ARB THEARPY RXD/TAKEN: ICD-10-PCS | Mod: CPTII,S$GLB,, | Performed by: INTERNAL MEDICINE

## 2021-11-01 PROCEDURE — 99214 PR OFFICE/OUTPT VISIT, EST, LEVL IV, 30-39 MIN: ICD-10-PCS | Mod: S$GLB,,, | Performed by: INTERNAL MEDICINE

## 2021-11-01 PROCEDURE — 3075F PR MOST RECENT SYSTOLIC BLOOD PRESS GE 130-139MM HG: ICD-10-PCS | Mod: CPTII,S$GLB,, | Performed by: INTERNAL MEDICINE

## 2021-11-01 PROCEDURE — 3075F SYST BP GE 130 - 139MM HG: CPT | Mod: CPTII,S$GLB,, | Performed by: INTERNAL MEDICINE

## 2021-11-01 PROCEDURE — 1160F RVW MEDS BY RX/DR IN RCRD: CPT | Mod: CPTII,S$GLB,, | Performed by: INTERNAL MEDICINE

## 2021-11-01 PROCEDURE — 3044F HG A1C LEVEL LT 7.0%: CPT | Mod: CPTII,S$GLB,, | Performed by: INTERNAL MEDICINE

## 2021-11-01 PROCEDURE — 3079F DIAST BP 80-89 MM HG: CPT | Mod: CPTII,S$GLB,, | Performed by: INTERNAL MEDICINE

## 2021-11-01 PROCEDURE — 3066F NEPHROPATHY DOC TX: CPT | Mod: CPTII,S$GLB,, | Performed by: INTERNAL MEDICINE

## 2021-11-01 PROCEDURE — 93010 EKG 12-LEAD: ICD-10-PCS | Mod: ,,, | Performed by: INTERNAL MEDICINE

## 2021-11-01 PROCEDURE — 99999 PR PBB SHADOW E&M-EST. PATIENT-LVL III: CPT | Mod: PBBFAC,,, | Performed by: INTERNAL MEDICINE

## 2021-11-01 PROCEDURE — 93010 ELECTROCARDIOGRAM REPORT: CPT | Mod: ,,, | Performed by: INTERNAL MEDICINE

## 2021-11-01 PROCEDURE — 3079F PR MOST RECENT DIASTOLIC BLOOD PRESSURE 80-89 MM HG: ICD-10-PCS | Mod: CPTII,S$GLB,, | Performed by: INTERNAL MEDICINE

## 2021-11-01 PROCEDURE — 3008F PR BODY MASS INDEX (BMI) DOCUMENTED: ICD-10-PCS | Mod: CPTII,S$GLB,, | Performed by: INTERNAL MEDICINE

## 2021-11-01 PROCEDURE — 3044F PR MOST RECENT HEMOGLOBIN A1C LEVEL <7.0%: ICD-10-PCS | Mod: CPTII,S$GLB,, | Performed by: INTERNAL MEDICINE

## 2021-11-01 PROCEDURE — 99999 PR PBB SHADOW E&M-EST. PATIENT-LVL III: ICD-10-PCS | Mod: PBBFAC,,, | Performed by: INTERNAL MEDICINE

## 2021-11-01 RX ORDER — CARVEDILOL 25 MG/1
25 TABLET ORAL 2 TIMES DAILY WITH MEALS
Qty: 180 TABLET | Refills: 1 | Status: SHIPPED | OUTPATIENT
Start: 2021-11-01 | End: 2022-01-28 | Stop reason: SDUPTHER

## 2021-11-02 ENCOUNTER — IMMUNIZATION (OUTPATIENT)
Dept: PHARMACY | Facility: CLINIC | Age: 31
End: 2021-11-02
Payer: COMMERCIAL

## 2021-11-02 DIAGNOSIS — Z23 NEED FOR VACCINATION: Primary | ICD-10-CM

## 2021-11-11 ENCOUNTER — HOSPITAL ENCOUNTER (OUTPATIENT)
Dept: CARDIOLOGY | Facility: HOSPITAL | Age: 31
Discharge: HOME | End: 2021-11-11
Attending: INTERNAL MEDICINE
Payer: COMMERCIAL

## 2021-11-11 DIAGNOSIS — R00.0 TACHYCARDIA: ICD-10-CM

## 2021-11-11 DIAGNOSIS — R00.2 PALPITATIONS: ICD-10-CM

## 2021-11-11 LAB
CV STRESS BASE HR: 91 BPM
DIASTOLIC BLOOD PRESSURE: 73 MMHG
OHS CV CPX 1 MINUTE RECOVERY HEART RATE: 150 BPM
OHS CV CPX 85 PERCENT MAX PREDICTED HEART RATE MALE: 151
OHS CV CPX ESTIMATED METS: 9
OHS CV CPX MAX PREDICTED HEART RATE: 178
OHS CV CPX PATIENT IS FEMALE: 1
OHS CV CPX PATIENT IS MALE: 0
OHS CV CPX PEAK DIASTOLIC BLOOD PRESSURE: 75 MMHG
OHS CV CPX PEAK HEAR RATE: 164 BPM
OHS CV CPX PEAK RATE PRESSURE PRODUCT: NORMAL
OHS CV CPX PEAK SYSTOLIC BLOOD PRESSURE: 156 MMHG
OHS CV CPX PERCENT MAX PREDICTED HEART RATE ACHIEVED: 92
OHS CV CPX RATE PRESSURE PRODUCT PRESENTING: NORMAL
STRESS ECHO POST EXERCISE DUR MIN: 7 MINUTES
STRESS ECHO POST EXERCISE DUR SEC: 0 SECONDS
SYSTOLIC BLOOD PRESSURE: 130 MMHG

## 2021-11-11 PROCEDURE — 93018 EXERCISE STRESS - EKG (CUPID ONLY): ICD-10-PCS | Mod: ,,, | Performed by: INTERNAL MEDICINE

## 2021-11-11 PROCEDURE — 93016 CV STRESS TEST SUPVJ ONLY: CPT | Mod: ,,, | Performed by: INTERNAL MEDICINE

## 2021-11-11 PROCEDURE — 93016 EXERCISE STRESS - EKG (CUPID ONLY): ICD-10-PCS | Mod: ,,, | Performed by: INTERNAL MEDICINE

## 2021-11-11 PROCEDURE — 93018 CV STRESS TEST I&R ONLY: CPT | Mod: ,,, | Performed by: INTERNAL MEDICINE

## 2021-11-11 PROCEDURE — 93017 CV STRESS TEST TRACING ONLY: CPT

## 2021-11-15 ENCOUNTER — HOSPITAL ENCOUNTER (OUTPATIENT)
Dept: CARDIOLOGY | Facility: HOSPITAL | Age: 31
Discharge: HOME | End: 2021-11-15
Attending: INTERNAL MEDICINE
Payer: COMMERCIAL

## 2021-11-15 DIAGNOSIS — I12.9 HYPERTENSION, RENAL: ICD-10-CM

## 2021-11-15 DIAGNOSIS — R00.0 TACHYCARDIA: ICD-10-CM

## 2021-11-15 DIAGNOSIS — R00.2 PALPITATIONS: ICD-10-CM

## 2021-12-15 ENCOUNTER — TELEPHONE (OUTPATIENT)
Dept: CARDIOLOGY | Facility: HOSPITAL | Age: 31
End: 2021-12-15
Payer: COMMERCIAL

## 2022-01-31 RX ORDER — CARVEDILOL 25 MG/1
25 TABLET ORAL 2 TIMES DAILY WITH MEALS
Qty: 180 TABLET | Refills: 1 | Status: SHIPPED | OUTPATIENT
Start: 2022-01-31 | End: 2022-12-28 | Stop reason: SDUPTHER

## 2022-03-21 ENCOUNTER — PATIENT MESSAGE (OUTPATIENT)
Dept: NEPHROLOGY | Facility: CLINIC | Age: 32
End: 2022-03-21
Payer: COMMERCIAL

## 2022-03-21 DIAGNOSIS — N18.31 STAGE 3A CHRONIC KIDNEY DISEASE: Primary | ICD-10-CM

## 2022-03-21 DIAGNOSIS — Q61.3 POLYCYSTIC KIDNEY DISEASE: ICD-10-CM

## 2022-03-24 ENCOUNTER — OFFICE VISIT (OUTPATIENT)
Dept: OTOLARYNGOLOGY | Facility: CLINIC | Age: 32
End: 2022-03-24
Payer: COMMERCIAL

## 2022-03-24 VITALS — TEMPERATURE: 98 F | BODY MASS INDEX: 27.83 KG/M2 | WEIGHT: 177.69 LBS

## 2022-03-24 DIAGNOSIS — H61.23 BILATERAL IMPACTED CERUMEN: Primary | ICD-10-CM

## 2022-03-24 PROCEDURE — 99999 PR PBB SHADOW E&M-EST. PATIENT-LVL III: ICD-10-PCS | Mod: PBBFAC,,, | Performed by: PHYSICIAN ASSISTANT

## 2022-03-24 PROCEDURE — 3008F BODY MASS INDEX DOCD: CPT | Mod: CPTII,S$GLB,, | Performed by: PHYSICIAN ASSISTANT

## 2022-03-24 PROCEDURE — 99499 UNLISTED E&M SERVICE: CPT | Mod: S$GLB,,, | Performed by: PHYSICIAN ASSISTANT

## 2022-03-24 PROCEDURE — 3008F PR BODY MASS INDEX (BMI) DOCUMENTED: ICD-10-PCS | Mod: CPTII,S$GLB,, | Performed by: PHYSICIAN ASSISTANT

## 2022-03-24 PROCEDURE — 4010F ACE/ARB THERAPY RXD/TAKEN: CPT | Mod: CPTII,S$GLB,, | Performed by: PHYSICIAN ASSISTANT

## 2022-03-24 PROCEDURE — 99499 NO LOS: ICD-10-PCS | Mod: S$GLB,,, | Performed by: PHYSICIAN ASSISTANT

## 2022-03-24 PROCEDURE — 69210 PR REMOVAL IMPACTED CERUMEN REQUIRING INSTRUMENTATION, UNILATERAL: ICD-10-PCS | Mod: S$GLB,,, | Performed by: PHYSICIAN ASSISTANT

## 2022-03-24 PROCEDURE — 99999 PR PBB SHADOW E&M-EST. PATIENT-LVL III: CPT | Mod: PBBFAC,,, | Performed by: PHYSICIAN ASSISTANT

## 2022-03-24 PROCEDURE — 1159F MED LIST DOCD IN RCRD: CPT | Mod: CPTII,S$GLB,, | Performed by: PHYSICIAN ASSISTANT

## 2022-03-24 PROCEDURE — 4010F PR ACE/ARB THEARPY RXD/TAKEN: ICD-10-PCS | Mod: CPTII,S$GLB,, | Performed by: PHYSICIAN ASSISTANT

## 2022-03-24 PROCEDURE — 69210 REMOVE IMPACTED EAR WAX UNI: CPT | Mod: S$GLB,,, | Performed by: PHYSICIAN ASSISTANT

## 2022-03-24 PROCEDURE — 1159F PR MEDICATION LIST DOCUMENTED IN MEDICAL RECORD: ICD-10-PCS | Mod: CPTII,S$GLB,, | Performed by: PHYSICIAN ASSISTANT

## 2022-03-24 NOTE — PROGRESS NOTES
Subjective:   Patient ID: Nathalie Roland is a 31 y.o. female.    Chief Complaint: Tinnitus (Pt complains of ringing in right ear, has hx of was buildup)    Patient is here to see me today for evaluation of a possible wax impaction in bilateral ears.  She has complaints of hearing loss in the affected ears, but denies pain or drainage.  This has been an issue in the past.  The patient has been using any sort of ear drop to soften the wax.    Review of patient's allergies indicates:  No Known Allergies        Review of Systems   Constitutional: Negative for chills, fatigue, fever and unexpected weight change.   HENT: Positive for tinnitus. Negative for congestion, dental problem, ear discharge, ear pain, facial swelling, hearing loss, nosebleeds, postnasal drip, rhinorrhea, sinus pressure, sneezing, sore throat, trouble swallowing and voice change.    Eyes: Negative for redness, itching and visual disturbance.   Respiratory: Negative for cough, choking, shortness of breath and wheezing.    Cardiovascular: Negative for chest pain and palpitations.   Gastrointestinal: Negative for abdominal pain.        No reflux.   Musculoskeletal: Negative for gait problem.   Skin: Negative for rash.   Neurological: Negative for dizziness, light-headedness and headaches.         Objective:   Temp 97.9 °F (36.6 °C) (Temporal)   Wt 80.6 kg (177 lb 11.1 oz)   BMI 27.83 kg/m²     Physical Exam  HENT:      Right Ear: There is impacted cerumen.      Left Ear: There is impacted cerumen.       Procedure Note    CHIEF COMPLAINT:  Cerumen Impaction    Description:  The patient was seated in an exam chair.  An ear speculum was placed in the right EAC and was examined under the microscope.  Suction and/or loop curettes were used to remove a large cerumen impaction.  The tympanic membrane was visualized and was normal in appearance.  The procedure was repeated on the left side in a similar fashion.  The TM was intact and normal on  this side as well.  The patient tolerated the procedure well.    Assessment:     1. Bilateral impacted cerumen        Plan:     Bilateral impacted cerumen       Cerumen impaction:  Removed today without difficulty.  I would recommend the use of a wax softening drop, either over the counter Debrox or mineral oil, on a weekly basis.  I also instructed the patient to avoid Qtips.

## 2022-03-25 ENCOUNTER — CLINICAL SUPPORT (OUTPATIENT)
Dept: AUDIOLOGY | Facility: CLINIC | Age: 32
End: 2022-03-25
Payer: COMMERCIAL

## 2022-03-25 ENCOUNTER — OFFICE VISIT (OUTPATIENT)
Dept: OTOLARYNGOLOGY | Facility: CLINIC | Age: 32
End: 2022-03-25
Payer: COMMERCIAL

## 2022-03-25 VITALS
WEIGHT: 178.38 LBS | HEART RATE: 90 BPM | SYSTOLIC BLOOD PRESSURE: 131 MMHG | DIASTOLIC BLOOD PRESSURE: 96 MMHG | BODY MASS INDEX: 27.93 KG/M2 | TEMPERATURE: 98 F

## 2022-03-25 DIAGNOSIS — H93.13 TINNITUS, BILATERAL: Primary | ICD-10-CM

## 2022-03-25 PROCEDURE — 92555 PR SPEECH THRESHOLD AUDIOMETRY: ICD-10-PCS | Mod: S$GLB,,, | Performed by: AUDIOLOGIST-HEARING AID FITTER

## 2022-03-25 PROCEDURE — 99999 PR PBB SHADOW E&M-EST. PATIENT-LVL III: CPT | Mod: PBBFAC,,, | Performed by: STUDENT IN AN ORGANIZED HEALTH CARE EDUCATION/TRAINING PROGRAM

## 2022-03-25 PROCEDURE — 92567 TYMPANOMETRY: CPT | Mod: S$GLB,,, | Performed by: AUDIOLOGIST-HEARING AID FITTER

## 2022-03-25 PROCEDURE — 92567 PR TYMPA2METRY: ICD-10-PCS | Mod: S$GLB,,, | Performed by: AUDIOLOGIST-HEARING AID FITTER

## 2022-03-25 PROCEDURE — 3008F BODY MASS INDEX DOCD: CPT | Mod: CPTII,S$GLB,, | Performed by: STUDENT IN AN ORGANIZED HEALTH CARE EDUCATION/TRAINING PROGRAM

## 2022-03-25 PROCEDURE — 3075F SYST BP GE 130 - 139MM HG: CPT | Mod: CPTII,S$GLB,, | Performed by: STUDENT IN AN ORGANIZED HEALTH CARE EDUCATION/TRAINING PROGRAM

## 2022-03-25 PROCEDURE — 99999 PR PBB SHADOW E&M-EST. PATIENT-LVL I: CPT | Mod: PBBFAC,,, | Performed by: AUDIOLOGIST-HEARING AID FITTER

## 2022-03-25 PROCEDURE — 3080F PR MOST RECENT DIASTOLIC BLOOD PRESSURE >= 90 MM HG: ICD-10-PCS | Mod: CPTII,S$GLB,, | Performed by: STUDENT IN AN ORGANIZED HEALTH CARE EDUCATION/TRAINING PROGRAM

## 2022-03-25 PROCEDURE — 4010F PR ACE/ARB THEARPY RXD/TAKEN: ICD-10-PCS | Mod: CPTII,S$GLB,, | Performed by: STUDENT IN AN ORGANIZED HEALTH CARE EDUCATION/TRAINING PROGRAM

## 2022-03-25 PROCEDURE — 3008F PR BODY MASS INDEX (BMI) DOCUMENTED: ICD-10-PCS | Mod: CPTII,S$GLB,, | Performed by: STUDENT IN AN ORGANIZED HEALTH CARE EDUCATION/TRAINING PROGRAM

## 2022-03-25 PROCEDURE — 99999 PR PBB SHADOW E&M-EST. PATIENT-LVL III: ICD-10-PCS | Mod: PBBFAC,,, | Performed by: STUDENT IN AN ORGANIZED HEALTH CARE EDUCATION/TRAINING PROGRAM

## 2022-03-25 PROCEDURE — 3075F PR MOST RECENT SYSTOLIC BLOOD PRESS GE 130-139MM HG: ICD-10-PCS | Mod: CPTII,S$GLB,, | Performed by: STUDENT IN AN ORGANIZED HEALTH CARE EDUCATION/TRAINING PROGRAM

## 2022-03-25 PROCEDURE — 92552 PR PURE TONE AUDIOMETRY, AIR: ICD-10-PCS | Mod: S$GLB,,, | Performed by: AUDIOLOGIST-HEARING AID FITTER

## 2022-03-25 PROCEDURE — 92555 SPEECH THRESHOLD AUDIOMETRY: CPT | Mod: S$GLB,,, | Performed by: AUDIOLOGIST-HEARING AID FITTER

## 2022-03-25 PROCEDURE — 1159F PR MEDICATION LIST DOCUMENTED IN MEDICAL RECORD: ICD-10-PCS | Mod: CPTII,S$GLB,, | Performed by: STUDENT IN AN ORGANIZED HEALTH CARE EDUCATION/TRAINING PROGRAM

## 2022-03-25 PROCEDURE — 99214 PR OFFICE/OUTPT VISIT, EST, LEVL IV, 30-39 MIN: ICD-10-PCS | Mod: S$GLB,,, | Performed by: STUDENT IN AN ORGANIZED HEALTH CARE EDUCATION/TRAINING PROGRAM

## 2022-03-25 PROCEDURE — 1159F MED LIST DOCD IN RCRD: CPT | Mod: CPTII,S$GLB,, | Performed by: STUDENT IN AN ORGANIZED HEALTH CARE EDUCATION/TRAINING PROGRAM

## 2022-03-25 PROCEDURE — 92552 PURE TONE AUDIOMETRY AIR: CPT | Mod: S$GLB,,, | Performed by: AUDIOLOGIST-HEARING AID FITTER

## 2022-03-25 PROCEDURE — 3080F DIAST BP >= 90 MM HG: CPT | Mod: CPTII,S$GLB,, | Performed by: STUDENT IN AN ORGANIZED HEALTH CARE EDUCATION/TRAINING PROGRAM

## 2022-03-25 PROCEDURE — 4010F ACE/ARB THERAPY RXD/TAKEN: CPT | Mod: CPTII,S$GLB,, | Performed by: STUDENT IN AN ORGANIZED HEALTH CARE EDUCATION/TRAINING PROGRAM

## 2022-03-25 PROCEDURE — 99999 PR PBB SHADOW E&M-EST. PATIENT-LVL I: ICD-10-PCS | Mod: PBBFAC,,, | Performed by: AUDIOLOGIST-HEARING AID FITTER

## 2022-03-25 PROCEDURE — 99214 OFFICE O/P EST MOD 30 MIN: CPT | Mod: S$GLB,,, | Performed by: STUDENT IN AN ORGANIZED HEALTH CARE EDUCATION/TRAINING PROGRAM

## 2022-03-25 NOTE — PATIENT INSTRUCTIONS
Tinnitus (ringing in the ears)    Tinnitus is the perception of sound when no actual external noise is present. While it is commonly referred to as ringing in the ears, tinnitus can manifest many different perceptions of sound, including buzzing, hissing, whistling, swooshing, and clicking.     Preventative measures to help prevent and minimize tinnitus include:     Reduce exposure to extremely loud noise  Avoid total silence  Decrease salt intake  Monitor one's blood pressure  Avoid stimulants such as caffeine and nicotine  Exercise  Reduce fatigue  Manage stress    Sound Therapy: Sound therapy is a broad term that may be used in many ways. In general, sound therapy means the use of external noise in order to alter your perception of tinnitus. Like other tinnitus treatments, sound therapies do not cure the condition, but they may significantly lower the burden and intensity of tinnitus. Some options for sound therapy include:    Portable sound generator/sound machines: these can be purchased at certain electronic suppliers. They can produce soothing sounds that help to dampen your brain's recognition of the tinnitus.   Example of a portable and affordable sound machine: https://ReserveMyHome.[email or web address]-Relaxing-Soothing-Charging-Auto-Off/dp/[email or web address]/ref=pd_lpo_[email or web address]?pd_rd_i=[email or web address]&psc=1    Home masking: such as the use of electric fans, radios or television to dampen the tinnitus.    Music therapy: Many patients find that music, particularly classical passages that don't contain wide variations in loudness (ampliltude) can be both soothing to the limbic system (the emotional processor in the brain that is commonly negatively linked to a patient's reaction to tinnitus) and stimulating to the auditory cortex. There are several Apps available that have preselected music that can help with tinnitus. Some examples include: Audio Care, Vincenzo, Beltone Tinnitus Calmer, and MyNoise, among others.    Amplification: The  use of hearing aids and a combination of hearing aids and maskers are often effective ways to minimize tinnitus. While it is not clear whether hearing aids help by amplifying background sounds that can mask out the tinnitus or by actually altering the production of tinnitus, most hearing aid wearers report at least some reduction in their tinnitus. This may be due to the reduction in contrast between tinnitus and silence, or because of the new stimulation provided to the brain. This an especially helpful option when the patient also suffers from hearing loss as the hearing aids may simultaneously improving the hearing and tinnitus.    Tinnitus Retraining Therapy (TRT) does exist, but is not offered by any providers in our state. If all other measures fail and your tinnitus is debilitating it might be worth searching for TRT providers in nearby states. These techniques consist of two main components -- directive counseling and low level sound generators. Directive counseling provides intensive, individualized education regarding the causes and effects of tinnitus on the ear, the brain, and the coping mechanism. Low-level sound generators may help the brain relearn a pattern that will de-emphasize the importance of the tinnitus. These devices also may be helpful in desensitizing patients who are overly sensitive to sound.    There are also support groups that exist which can be helpful for some patients.     More helpful information can be found on the American Tinnitus Association website: https://www.celestino.org/

## 2022-03-25 NOTE — PROGRESS NOTES
Chief complaint: No chief complaint on file.       History of Present Illness, 3/24/22:   Patient is here to see me today for evaluation of a possible wax impaction in bilateral ears.  She has complaints of hearing loss in the affected ears, but denies pain or drainage.  This has been an issue in the past.  The patient has been using any sort of ear drop to soften the wax.    Return clinic visit, 3/25/22    Cerumen removed yesterday. Tinnitus has not resolved.     Onset of tinnitus was abrupt starting 3 days ago ago with unchanged course since that time. Patient describes the tinnitus as constant located in the right ear. The quality is described as high pitch. The pattern is nonpulsatile with an intensity that is medium. Patient describes her level of annoyance as moderately annoying, always aware. Associated symptoms include no hearing loss, pain, dizziness, drainage or recurrent otitis. Previous treatments include none.    No inciting illness or trauma. Has had some increased anxiety recently.      Review of Systems     A complete 10 point ROS was completed and are positive as per above HPI.    Otherwise negative for fever, diplopia, chest pain, shortness of breath, vomiting, blood in urine, joint pain, skin rash, seizures and unusual bleeding.       History        Past Medical History:   Past Medical History:   Diagnosis Date    Anemia     Anxiety     Hypertension     renal hypertension    Migraine     Polycystic kidney disease     .          Past Surgical History:  Past Surgical History:   Procedure Laterality Date     SECTION      eye cyst      HERNIA REPAIR      UMBILICAL HERNIA REPAIR     .         Medications: Medication list was reviewed. She  has a current medication list which includes the following prescription(s): carvedilol, enalapril, escitalopram oxalate, fluticasone propionate, iron-vitamin c 100-250 mg (icar-c), ketoconazole, methocarbamol, sodium bicarbonate, and tizanidine.          Allergies: Review of patient's allergies indicates:  No Known Allergies         Family history: family history includes Bipolar disorder in her paternal aunt; Breast cancer in her paternal grandmother; Cancer in her mother and paternal grandmother; Heart attack in her paternal grandfather; Heart failure in her paternal grandmother; Hypertension in her maternal grandfather, maternal uncle, mother, paternal grandfather, and paternal grandmother; No Known Problems in her father; Polycystic kidney disease in her maternal aunt, maternal grandfather, maternal uncle, and mother.         Social History          Alcohol use:  reports current alcohol use.            Tobacco:  reports that she has never smoked. She has never used smokeless tobacco.         Please see the patient's intake form for full details of past medical history, past surgical history, family history, social history and review of systems. ?This information was reviewed by me and noted.      Physical Examination     General: Well developed, well nourished, well hydrated. Verbal with a strong voice and not dysphonic.     Head/Face: Normocephalic, atraumatic. No scars or lesions. Facial musculature equal.     Eyes: No scleral icterus or conjunctival hemorrhage. EOMI. PERRLA.     Ears:     · Right ear: No gross deformity. EAC is clear of debris and erythema. The TM is intact with a pneumatized middle ear. No signs of retraction, fluid or infection.      · Left ear: No gross deformity. EAC is clear of debris and erythema. The TM is intact with a pneumatized middle ear. No signs of retraction, fluid or infection.     Nose: No gross deformity or lesions. No purulent discharge. No significant NSD.      Mouth/Oropharynx: Lips without any lesions. No mucosal lesions within the oropharynx. No tonsillar exudate or lesions. Pharyngeal walls symmetrical. Uvula midline. Tongue midline without lesions.     Neck: Trachea midline. No masses. No thyromegaly or  nodules palpated.     Lymphatic: No lymphadenopathy in the neck.     Extremities: No cyanosis. Warm and well-perfused.     Skin: No scars or lesions on face or neck.      Neurologic: Moving all extremities without gross abnormality.CN II-XII grossly intact. House-Brackmann 1/6. No signs of nystagmus.      Psych: Alert and oriented to person, place, and time with an appropriate mood and affect.       Audiogram     Audiogram, 03/25/2022 was independently reviewed      Normal hearing, asymmetric component at 8000 hz only    Imaging  5/23/2020 MRA  normal MRA     Assessment     Tinnitus, right    Plan:    Nathalie has unilateral tinnitus without hearing loss other concerning symptoms for 3 days. There was no inciting event, but has had increased anxiety recently. We discussed anxiety and stress as a cause of tinnitus and I provider her with a handout for conservative measures to cope with tinnitus.    However, there is a chance this could be due to a retrocochlear lesion, as occasionally the only presenting symptom will be unilateral tinnitus. If the tinnitus has not resolved within 2 weeks of onset we will plan for an MRI. She will call and update us on her symptoms within 2 weeks.            Americo Hickey MD  [facility]   [facility]  [id number] [place] [address].  [place] [id number]  P: [phone]  F: [phone]

## 2022-03-25 NOTE — PROGRESS NOTES
Nathalie Roland was seen 03/25/2022 for an audiological evaluation.  Patient complains of new onset tinnitus that began 2 weeks ago. She has a hx of cerumen impactions and had her ears cleaned yesterday by Bessy Shaikh PA-C. Ears were cleaned successfully. Reports hearing has returned to baseline but tinnitus remains.     Results reveal normal hearing in each ear from 250-8000 Hz.   Speech Reception Thresholds were  5 dBHL for the right ear and 5 dBHL for the left ear. Tympanograms were Type A, normal for the right ear and Type A, normal for the left ear.    Patient was counseled on the above findings.    Recommendations:  1. ENT review  2. Audiograms as needed.

## 2022-03-31 ENCOUNTER — PATIENT MESSAGE (OUTPATIENT)
Dept: NEPHROLOGY | Facility: CLINIC | Age: 32
End: 2022-03-31
Payer: COMMERCIAL

## 2022-03-31 DIAGNOSIS — I10 HYPERTENSION, UNSPECIFIED TYPE: ICD-10-CM

## 2022-04-02 RX ORDER — ENALAPRIL MALEATE 20 MG/1
TABLET ORAL
Qty: 180 TABLET | Refills: 1 | Status: SHIPPED | OUTPATIENT
Start: 2022-04-02 | End: 2022-09-27

## 2022-04-27 ENCOUNTER — PATIENT MESSAGE (OUTPATIENT)
Dept: ADMINISTRATIVE | Facility: HOSPITAL | Age: 32
End: 2022-04-27
Payer: COMMERCIAL

## 2022-05-09 ENCOUNTER — OFFICE VISIT (OUTPATIENT)
Dept: CARDIOLOGY | Facility: CLINIC | Age: 32
End: 2022-05-09
Payer: COMMERCIAL

## 2022-05-09 ENCOUNTER — HOSPITAL ENCOUNTER (OUTPATIENT)
Dept: CARDIOLOGY | Facility: HOSPITAL | Age: 32
Discharge: HOME | End: 2022-05-09
Attending: INTERNAL MEDICINE
Payer: COMMERCIAL

## 2022-05-09 VITALS
HEIGHT: 67 IN | HEART RATE: 84 BPM | OXYGEN SATURATION: 84 % | SYSTOLIC BLOOD PRESSURE: 120 MMHG | WEIGHT: 181.19 LBS | DIASTOLIC BLOOD PRESSURE: 70 MMHG | BODY MASS INDEX: 28.44 KG/M2

## 2022-05-09 DIAGNOSIS — I12.9 HYPERTENSION, RENAL: Primary | ICD-10-CM

## 2022-05-09 DIAGNOSIS — R00.0 TACHYCARDIA: ICD-10-CM

## 2022-05-09 DIAGNOSIS — N18.31 STAGE 3A CHRONIC KIDNEY DISEASE: ICD-10-CM

## 2022-05-09 DIAGNOSIS — R00.2 PALPITATIONS: ICD-10-CM

## 2022-05-09 DIAGNOSIS — R00.2 PALPITATIONS: Primary | ICD-10-CM

## 2022-05-09 DIAGNOSIS — I12.9 HYPERTENSION, RENAL: ICD-10-CM

## 2022-05-09 DIAGNOSIS — Q61.3 POLYCYSTIC KIDNEY DISEASE: ICD-10-CM

## 2022-05-09 DIAGNOSIS — G43.809 OTHER MIGRAINE WITHOUT STATUS MIGRAINOSUS, NOT INTRACTABLE: ICD-10-CM

## 2022-05-09 PROCEDURE — 4010F ACE/ARB THERAPY RXD/TAKEN: CPT | Mod: CPTII,S$GLB,, | Performed by: INTERNAL MEDICINE

## 2022-05-09 PROCEDURE — 1160F RVW MEDS BY RX/DR IN RCRD: CPT | Mod: CPTII,S$GLB,, | Performed by: INTERNAL MEDICINE

## 2022-05-09 PROCEDURE — 4010F PR ACE/ARB THEARPY RXD/TAKEN: ICD-10-PCS | Mod: CPTII,S$GLB,, | Performed by: INTERNAL MEDICINE

## 2022-05-09 PROCEDURE — 3074F SYST BP LT 130 MM HG: CPT | Mod: CPTII,S$GLB,, | Performed by: INTERNAL MEDICINE

## 2022-05-09 PROCEDURE — 3078F PR MOST RECENT DIASTOLIC BLOOD PRESSURE < 80 MM HG: ICD-10-PCS | Mod: CPTII,S$GLB,, | Performed by: INTERNAL MEDICINE

## 2022-05-09 PROCEDURE — 3008F BODY MASS INDEX DOCD: CPT | Mod: CPTII,S$GLB,, | Performed by: INTERNAL MEDICINE

## 2022-05-09 PROCEDURE — 3078F DIAST BP <80 MM HG: CPT | Mod: CPTII,S$GLB,, | Performed by: INTERNAL MEDICINE

## 2022-05-09 PROCEDURE — 1159F PR MEDICATION LIST DOCUMENTED IN MEDICAL RECORD: ICD-10-PCS | Mod: CPTII,S$GLB,, | Performed by: INTERNAL MEDICINE

## 2022-05-09 PROCEDURE — 99999 PR PBB SHADOW E&M-EST. PATIENT-LVL III: CPT | Mod: PBBFAC,,, | Performed by: INTERNAL MEDICINE

## 2022-05-09 PROCEDURE — 1159F MED LIST DOCD IN RCRD: CPT | Mod: CPTII,S$GLB,, | Performed by: INTERNAL MEDICINE

## 2022-05-09 PROCEDURE — 93005 ELECTROCARDIOGRAM TRACING: CPT | Mod: PO

## 2022-05-09 PROCEDURE — 3074F PR MOST RECENT SYSTOLIC BLOOD PRESSURE < 130 MM HG: ICD-10-PCS | Mod: CPTII,S$GLB,, | Performed by: INTERNAL MEDICINE

## 2022-05-09 PROCEDURE — 99999 PR PBB SHADOW E&M-EST. PATIENT-LVL III: ICD-10-PCS | Mod: PBBFAC,,, | Performed by: INTERNAL MEDICINE

## 2022-05-09 PROCEDURE — 99214 OFFICE O/P EST MOD 30 MIN: CPT | Mod: S$GLB,,, | Performed by: INTERNAL MEDICINE

## 2022-05-09 PROCEDURE — 93010 EKG 12-LEAD: ICD-10-PCS | Mod: ,,, | Performed by: STUDENT IN AN ORGANIZED HEALTH CARE EDUCATION/TRAINING PROGRAM

## 2022-05-09 PROCEDURE — 1160F PR REVIEW ALL MEDS BY PRESCRIBER/CLIN PHARMACIST DOCUMENTED: ICD-10-PCS | Mod: CPTII,S$GLB,, | Performed by: INTERNAL MEDICINE

## 2022-05-09 PROCEDURE — 3008F PR BODY MASS INDEX (BMI) DOCUMENTED: ICD-10-PCS | Mod: CPTII,S$GLB,, | Performed by: INTERNAL MEDICINE

## 2022-05-09 PROCEDURE — 99214 PR OFFICE/OUTPT VISIT, EST, LEVL IV, 30-39 MIN: ICD-10-PCS | Mod: S$GLB,,, | Performed by: INTERNAL MEDICINE

## 2022-05-09 PROCEDURE — 93010 ELECTROCARDIOGRAM REPORT: CPT | Mod: ,,, | Performed by: STUDENT IN AN ORGANIZED HEALTH CARE EDUCATION/TRAINING PROGRAM

## 2022-05-09 NOTE — PROGRESS NOTES
"Subjective:   Patient ID:  Nathalie Roland is a 31 y.o. female who presents for cardiac consult of No chief complaint on file.    Referring Physician: Raul Santana MD   [id number] [place] [address]    Reason for consult: palpitations.       Follow-up      The patient came in today for cardiac consult of No chief complaint on file.      Nathalie Roland is a 31 y.o. female pt with HTN, PCKD, migraines, anxiety, anemia presents for follow up CV eval.     3/8/21  ECG last month with NSR hr 81, low voltage, nonspec T wave changes. She has been on labetol since pregnancy, she started having palpitations a few weeks ago.   She feels it couple of times a day, unsure of trigger, sometimes after laying down or after eating food. Occ cough. Symptoms may last few min to 10 min. Daughter is 3 years.   She was diagnosed PCKD at age 13. Her mother on transplant list for kidney.     4/26/21  BP elevated 130s/96 today. ECHO and Holter neg last month. No CP/SOB. Feels well today.     8/2/21  Bp and HR well controlled. She still has occ palpitations maybe 10 min lasts occurring a few weeks. No CP/SOB.     11/1/21  From pt last month - "just completed a 20 min workout. It has been about an hour since then and I'm geart rate has not gone below 110 and I've been experiencing a lot of palpitations. They also have been more frequent recently. I'm wondering if there's something I should do. "  Told pt - take an extra half dose of carvedilol if rates remain over 100 but monitor pressures and heart rates.  Bp and HR stable today. Her HR occ 120s after work out.   ECG - sinus demetrius, V rate 59    5/9/22  ECG stress test neg for ischemia 11/2021. BP and HR well controlled today. She still has palpitations had rash with Bardy.     Patient feels  no chest pain, no sob, no leg swelling, no PND, no syncope, no CNS symptoms.     Patient has fairly good exercise tolerance. Works at [employer] for " credentials.     Patient is compliant with medications.    FH - mother - had renal transplant for PCKD; HTN; grandfather - quad bypass    Results for orders placed during the hospital encounter of 21    Exercise Stress - EKG    Interpretation Summary    The EKG portion of this study is negative for ischemia.    The patient reported no chest pain during the stress test.    There were no arrhythmias during stress.    The exercise capacity was normal.      HOLTER 3/2021  · The diary was properly completed, see below  · There were rare PVCs totalling 219 and averaging 4.56 per hour.  · VT There were 0 runs.  · There were very rare PACs totalling 6 and averaging 0.13 per hour.  SVT There were 0 runs.    Results for orders placed during the hospital encounter of 21    Echo Color Flow Doppler? Yes    Interpretation Summary  · The left ventricle is normal in size with concentric remodeling and normal systolic function. The estimated ejection fraction is 55%  · Normal left ventricular diastolic function.  · With normal right ventricular systolic function.  · Normal central venous pressure (3 mmHg).  · The estimated PA systolic pressure is 38 mmHg.      ECG  Normal sinus rhythm  Low voltage QRS  Nonspecific T wave abnormality  Abnormal ECG  When compared with ECG of 23-MAY-2017 15:50,  No significant change was found  Confirmed by MD BERNICE, REJI ([id number]) on 2021 9:55:43 PM    Past Medical History:   Diagnosis Date    Anemia     Anxiety     Hypertension     renal hypertension    Migraine     Polycystic kidney disease        Past Surgical History:   Procedure Laterality Date     SECTION      eye cyst      HERNIA REPAIR      UMBILICAL HERNIA REPAIR         Social History     Tobacco Use    Smoking status: Never Smoker    Smokeless tobacco: Never Used   Substance Use Topics    Alcohol use: Yes     Alcohol/week: 0.0 standard drinks     Comment: socially    Drug use: No       Family History    Problem Relation Age of Onset    Polycystic kidney disease Mother     Hypertension Mother     Cancer Mother         thyroid cancer    Polycystic kidney disease Maternal Uncle     Hypertension Maternal Uncle     Polycystic kidney disease Maternal Grandfather     Hypertension Maternal Grandfather     Cancer Paternal Grandmother         breast cancer    Hypertension Paternal Grandmother     Heart failure Paternal Grandmother     Breast cancer Paternal Grandmother     Heart attack Paternal Grandfather     Hypertension Paternal Grandfather     No Known Problems Father     Polycystic kidney disease Maternal Aunt     Bipolar disorder Paternal Aunt     Colon cancer Neg Hx     Uterine cancer Neg Hx     Ovarian cancer Neg Hx        Patient's Medications   New Prescriptions    No medications on file   Previous Medications    CARVEDILOL (COREG) 25 MG TABLET    Take 1 tablet (25 mg total) by mouth 2 (two) times daily with meals.    ENALAPRIL (VASOTEC) 20 MG TABLET    One po BID    ESCITALOPRAM OXALATE (LEXAPRO) 20 MG TABLET    TAKE 1 TABLET(20 MG) BY MOUTH EVERY DAY    FLUTICASONE PROPIONATE (FLONASE) 50 MCG/ACTUATION NASAL SPRAY    2 sprays (100 mcg total) by Each Nostril route once daily.    IRON-VITAMIN C 100-250 MG, ICAR-C, 100-250 MG TAB    TAKE 1 TABLET BY MOUTH EVERY DAY    KETOCONAZOLE (NIZORAL) 2 % SHAMPOO    Wash hair with medicated shampoo at least 2x/week - let sit on scalp at least 5 minutes prior to rinsing    METHOCARBAMOL (ROBAXIN) 750 MG TAB    Take 750 mg by mouth 3 (three) times daily.    SODIUM BICARBONATE 650 MG TABLET    TAKE 1 TABLET BY MOUTH TWICE DAILY FOR METABOLIC ACIDOSIS    TIZANIDINE (ZANAFLEX) 4 MG TABLET    TAKE 1 TABLET BY MOUTH EVERY EVENING FOR 1 WEEK. INCREASE BY 1 TABLET AT NIGHT EVERY WEEK UP TO 16 MG EVERY NIGHT AT BEDTIME   Modified Medications    No medications on file   Discontinued Medications    No medications on file       Review of Systems   Constitutional:  "Negative.    HENT: Negative.    Eyes: Negative.    Respiratory: Negative.    Cardiovascular: Positive for palpitations.   Gastrointestinal: Negative.    Genitourinary: Negative.    Musculoskeletal: Negative.    Skin: Negative.    Neurological: Negative.    Endo/Heme/Allergies: Negative.    Psychiatric/Behavioral: Negative.    All 12 systems otherwise negative.      Wt Readings from Last 3 Encounters:   05/09/22 82.2 kg (181 lb 3.5 oz)   03/25/22 80.9 kg (178 lb 5.6 oz)   03/24/22 80.6 kg (177 lb 11.1 oz)     Temp Readings from Last 3 Encounters:   03/25/22 97.7 °F (36.5 °C)   03/24/22 97.9 °F (36.6 °C) (Temporal)   04/20/21 98.2 °F (36.8 °C) (Temporal)     BP Readings from Last 3 Encounters:   05/09/22 120/70   03/25/22 (!) 131/96   11/01/21 132/86     Pulse Readings from Last 3 Encounters:   05/09/22 84   03/25/22 90   11/01/21 78       /70   Pulse 84   Ht 5' 7" (1.702 m)   Wt 82.2 kg (181 lb 3.5 oz)   SpO2 (!) 84%   BMI 28.38 kg/m²     Objective:   Physical Exam  Vitals and nursing note reviewed.   Constitutional:       General: She is not in acute distress.     Appearance: She is well-developed. She is not diaphoretic.   HENT:      Head: Normocephalic and atraumatic.      Nose: Nose normal.   Eyes:      General: No scleral icterus.     Conjunctiva/sclera: Conjunctivae normal.   Neck:      Thyroid: No thyromegaly.      Vascular: No JVD.   Cardiovascular:      Rate and Rhythm: Normal rate and regular rhythm.      Heart sounds: S1 normal and S2 normal. No murmur heard.    No friction rub. No gallop. No S3 or S4 sounds.   Pulmonary:      Effort: Pulmonary effort is normal. No respiratory distress.      Breath sounds: Normal breath sounds. No stridor. No wheezing or rales.   Chest:      Chest wall: No tenderness.   Abdominal:      General: Bowel sounds are normal. There is no distension.      Palpations: Abdomen is soft. There is no mass.      Tenderness: There is no abdominal tenderness. There is no " rebound.   Genitourinary:     Comments: Deferred  Musculoskeletal:         General: No tenderness or deformity. Normal range of motion.      Cervical back: Normal range of motion and neck supple.   Lymphadenopathy:      Cervical: No cervical adenopathy.   Skin:     General: Skin is warm and dry.      Coloration: Skin is not pale.      Findings: No erythema or rash.   Neurological:      Mental Status: She is alert and oriented to person, place, and time.      Motor: No abnormal muscle tone.      Coordination: Coordination normal.   Psychiatric:         Behavior: Behavior normal.         Thought Content: Thought content normal.         Judgment: Judgment normal.         Lab Results   Component Value Date     10/06/2021    K 4.5 10/06/2021     10/06/2021    CO2 21 (L) 10/06/2021    BUN 13 10/06/2021    CREATININE 1.5 (H) 10/06/2021     10/06/2021    HGBA1C 5.0 06/23/2021    MG 2.1 08/27/2018    AST 16 02/04/2021    ALT 23 02/04/2021    ALBUMIN 3.8 10/06/2021    PROT 6.5 02/04/2021    BILITOT 0.5 02/04/2021    WBC 7.21 06/23/2021    HGB 11.5 (L) 06/23/2021    HCT 35.7 (L) 06/23/2021    MCV 94 06/23/2021     06/23/2021    TSH 2.546 09/28/2020    CHOL 186 12/12/2013    HDL 52 12/12/2013    LDLCALC 111.2 12/12/2013    TRIG 114 12/12/2013     Assessment:      1. Palpitations    2. Hypertension, renal    3. Tachycardia    4. Stage 3a chronic kidney disease    5. Polycystic kidney disease    6. Other migraine without status migrainosus, not intractable        Plan:     1. Palpitations with tachycardia  - change labetelol to coreg BID  - ECHO and Holter - neg  - order Bardy - change to Vital Monitor due to rash in past and not resulted   - ECG stress test neg 11/2021    2. HTN - improved  - cont meds and titrate  - increased Coreg    3. PCKD with CKD  - cont to monitor  - f/u with PCP/Nephro as needed    4. Anxiety, migraines  - cont tx     5. Anemia, Fe def  - cont tx   Thank you for allowing me to  participate in this patient's care. Please do not hesitate to contact me with any questions or concerns. Consult note has been forwarded to the referral physician.

## 2022-05-23 ENCOUNTER — HOSPITAL ENCOUNTER (OUTPATIENT)
Dept: CARDIOLOGY | Facility: HOSPITAL | Age: 32
Discharge: HOME | End: 2022-05-23
Attending: INTERNAL MEDICINE
Payer: COMMERCIAL

## 2022-05-23 DIAGNOSIS — R00.0 TACHYCARDIA: ICD-10-CM

## 2022-05-23 DIAGNOSIS — N18.31 STAGE 3A CHRONIC KIDNEY DISEASE: ICD-10-CM

## 2022-05-23 DIAGNOSIS — G43.809 OTHER MIGRAINE WITHOUT STATUS MIGRAINOSUS, NOT INTRACTABLE: ICD-10-CM

## 2022-05-23 DIAGNOSIS — R00.2 PALPITATIONS: ICD-10-CM

## 2022-05-23 DIAGNOSIS — I12.9 HYPERTENSION, RENAL: ICD-10-CM

## 2022-05-23 DIAGNOSIS — Q61.3 POLYCYSTIC KIDNEY DISEASE: ICD-10-CM

## 2022-05-23 PROCEDURE — 93248 CV CARDIAC MONITOR - 3-15 DAY ADULT (CUPID ONLY): ICD-10-PCS | Mod: ,,, | Performed by: INTERNAL MEDICINE

## 2022-05-23 PROCEDURE — 93246 EXT ECG>7D<15D RECORDING: CPT

## 2022-05-23 PROCEDURE — 93248 EXT ECG>7D<15D REV&INTERPJ: CPT | Mod: ,,, | Performed by: INTERNAL MEDICINE

## 2022-05-26 RX ORDER — IRON,CARBONYL/ASCORBIC ACID 100-250 MG
TABLET ORAL
Qty: 30 TABLET | Refills: 6 | Status: SHIPPED | OUTPATIENT
Start: 2022-05-26 | End: 2022-12-23

## 2022-06-09 LAB
OHS CV HOLTER SINUS AVERAGE HR: 77
OHS CV HOLTER SINUS MAX HR: 149
OHS CV HOLTER SINUS MIN HR: 46

## 2022-07-18 ENCOUNTER — PATIENT MESSAGE (OUTPATIENT)
Dept: NEPHROLOGY | Facility: CLINIC | Age: 32
End: 2022-07-18
Payer: COMMERCIAL

## 2022-07-21 ENCOUNTER — LAB VISIT (OUTPATIENT)
Dept: LAB | Facility: HOSPITAL | Age: 32
End: 2022-07-21
Attending: INTERNAL MEDICINE
Payer: COMMERCIAL

## 2022-07-21 DIAGNOSIS — N18.31 STAGE 3A CHRONIC KIDNEY DISEASE: ICD-10-CM

## 2022-07-21 DIAGNOSIS — Q61.3 POLYCYSTIC KIDNEY DISEASE: ICD-10-CM

## 2022-07-21 LAB
ALBUMIN SERPL BCP-MCNC: 3.9 G/DL (ref 3.5–5.2)
ANION GAP SERPL CALC-SCNC: 10 MMOL/L (ref 8–16)
BASOPHILS # BLD AUTO: 0.05 K/UL (ref 0–0.2)
BASOPHILS NFR BLD: 0.7 % (ref 0–1.9)
BILIRUB UR QL STRIP: NEGATIVE
BUN SERPL-MCNC: 19 MG/DL (ref 6–20)
CALCIUM SERPL-MCNC: 9.2 MG/DL (ref 8.7–10.5)
CHLORIDE SERPL-SCNC: 105 MMOL/L (ref 95–110)
CLARITY UR: CLEAR
CO2 SERPL-SCNC: 21 MMOL/L (ref 23–29)
COLOR UR: ABNORMAL
CREAT SERPL-MCNC: 1.8 MG/DL (ref 0.5–1.4)
CREAT UR-MCNC: 21 MG/DL (ref 15–325)
DIFFERENTIAL METHOD: ABNORMAL
EOSINOPHIL # BLD AUTO: 0.3 K/UL (ref 0–0.5)
EOSINOPHIL NFR BLD: 3.5 % (ref 0–8)
ERYTHROCYTE [DISTWIDTH] IN BLOOD BY AUTOMATED COUNT: 12.4 % (ref 11.5–14.5)
EST. GFR  (AFRICAN AMERICAN): 42.3 ML/MIN/1.73 M^2
EST. GFR  (NON AFRICAN AMERICAN): 36.7 ML/MIN/1.73 M^2
GLUCOSE SERPL-MCNC: 93 MG/DL (ref 70–110)
GLUCOSE UR QL STRIP: NEGATIVE
HCT VFR BLD AUTO: 34.3 % (ref 37–48.5)
HGB BLD-MCNC: 11.1 G/DL (ref 12–16)
HGB UR QL STRIP: ABNORMAL
IMM GRANULOCYTES # BLD AUTO: 0.02 K/UL (ref 0–0.04)
IMM GRANULOCYTES NFR BLD AUTO: 0.3 % (ref 0–0.5)
KETONES UR QL STRIP: NEGATIVE
LEUKOCYTE ESTERASE UR QL STRIP: NEGATIVE
LYMPHOCYTES # BLD AUTO: 2.6 K/UL (ref 1–4.8)
LYMPHOCYTES NFR BLD: 34.2 % (ref 18–48)
MCH RBC QN AUTO: 30.3 PG (ref 27–31)
MCHC RBC AUTO-ENTMCNC: 32.4 G/DL (ref 32–36)
MCV RBC AUTO: 94 FL (ref 82–98)
MICROSCOPIC COMMENT: NORMAL
MONOCYTES # BLD AUTO: 0.5 K/UL (ref 0.3–1)
MONOCYTES NFR BLD: 7.2 % (ref 4–15)
NEUTROPHILS # BLD AUTO: 4.1 K/UL (ref 1.8–7.7)
NEUTROPHILS NFR BLD: 54.1 % (ref 38–73)
NITRITE UR QL STRIP: NEGATIVE
NRBC BLD-RTO: 0 /100 WBC
PH UR STRIP: 6 [PH] (ref 5–8)
PHOSPHATE SERPL-MCNC: 3.4 MG/DL (ref 2.7–4.5)
PLATELET # BLD AUTO: 285 K/UL (ref 150–450)
PMV BLD AUTO: 10.8 FL (ref 9.2–12.9)
POTASSIUM SERPL-SCNC: 4.6 MMOL/L (ref 3.5–5.1)
PROT UR QL STRIP: NEGATIVE
PROT UR-MCNC: <7 MG/DL (ref 0–15)
PROT/CREAT UR: NORMAL MG/G{CREAT} (ref 0–0.2)
PTH-INTACT SERPL-MCNC: 137.2 PG/ML (ref 9–77)
RBC # BLD AUTO: 3.66 M/UL (ref 4–5.4)
RBC #/AREA URNS HPF: 0 /HPF (ref 0–4)
SODIUM SERPL-SCNC: 136 MMOL/L (ref 136–145)
SP GR UR STRIP: <=1.005 (ref 1–1.03)
URN SPEC COLLECT METH UR: ABNORMAL
WBC # BLD AUTO: 7.52 K/UL (ref 3.9–12.7)
WBC #/AREA URNS HPF: 2 /HPF (ref 0–5)

## 2022-07-21 PROCEDURE — 80069 RENAL FUNCTION PANEL: CPT | Performed by: INTERNAL MEDICINE

## 2022-07-21 PROCEDURE — 83970 ASSAY OF PARATHORMONE: CPT | Performed by: INTERNAL MEDICINE

## 2022-07-21 PROCEDURE — 36415 COLL VENOUS BLD VENIPUNCTURE: CPT | Performed by: INTERNAL MEDICINE

## 2022-07-21 PROCEDURE — 85025 COMPLETE CBC W/AUTO DIFF WBC: CPT | Performed by: INTERNAL MEDICINE

## 2022-07-21 PROCEDURE — 81000 URINALYSIS NONAUTO W/SCOPE: CPT | Performed by: INTERNAL MEDICINE

## 2022-07-21 PROCEDURE — 82570 ASSAY OF URINE CREATININE: CPT | Performed by: INTERNAL MEDICINE

## 2022-07-26 ENCOUNTER — HOSPITAL ENCOUNTER (OUTPATIENT)
Dept: RADIOLOGY | Facility: HOSPITAL | Age: 32
Discharge: HOME | End: 2022-07-26
Attending: INTERNAL MEDICINE
Payer: COMMERCIAL

## 2022-07-26 DIAGNOSIS — Q61.3 POLYCYSTIC KIDNEY DISEASE: ICD-10-CM

## 2022-07-26 DIAGNOSIS — N18.31 STAGE 3A CHRONIC KIDNEY DISEASE: ICD-10-CM

## 2022-07-26 PROCEDURE — 76770 US RETROPERITONEAL COMPLETE: ICD-10-PCS | Mod: 26,,, | Performed by: RADIOLOGY

## 2022-07-26 PROCEDURE — 76770 US EXAM ABDO BACK WALL COMP: CPT | Mod: 26,,, | Performed by: RADIOLOGY

## 2022-07-26 PROCEDURE — 76770 US EXAM ABDO BACK WALL COMP: CPT | Mod: TC

## 2022-07-27 ENCOUNTER — PATIENT MESSAGE (OUTPATIENT)
Dept: ADMINISTRATIVE | Facility: HOSPITAL | Age: 32
End: 2022-07-27
Payer: COMMERCIAL

## 2022-07-28 ENCOUNTER — PATIENT MESSAGE (OUTPATIENT)
Dept: NEPHROLOGY | Facility: CLINIC | Age: 32
End: 2022-07-28

## 2022-07-28 ENCOUNTER — OFFICE VISIT (OUTPATIENT)
Dept: NEPHROLOGY | Facility: CLINIC | Age: 32
End: 2022-07-28
Payer: COMMERCIAL

## 2022-07-28 DIAGNOSIS — E55.9 VITAMIN D DEFICIENCY: ICD-10-CM

## 2022-07-28 DIAGNOSIS — R80.1 PERSISTENT PROTEINURIA: ICD-10-CM

## 2022-07-28 DIAGNOSIS — N18.31 STAGE 3A CHRONIC KIDNEY DISEASE: Primary | ICD-10-CM

## 2022-07-28 DIAGNOSIS — Q61.3 POLYCYSTIC KIDNEY DISEASE: ICD-10-CM

## 2022-07-28 DIAGNOSIS — E87.20 METABOLIC ACIDOSIS: ICD-10-CM

## 2022-07-28 DIAGNOSIS — I12.9 HYPERTENSION, RENAL: ICD-10-CM

## 2022-07-28 DIAGNOSIS — G43.809 OTHER MIGRAINE WITHOUT STATUS MIGRAINOSUS, NOT INTRACTABLE: ICD-10-CM

## 2022-07-28 PROCEDURE — 3066F NEPHROPATHY DOC TX: CPT | Mod: CPTII,95,, | Performed by: INTERNAL MEDICINE

## 2022-07-28 PROCEDURE — 99215 PR OFFICE/OUTPT VISIT, EST, LEVL V, 40-54 MIN: ICD-10-PCS | Mod: 95,,, | Performed by: INTERNAL MEDICINE

## 2022-07-28 PROCEDURE — 99215 OFFICE O/P EST HI 40 MIN: CPT | Mod: 95,,, | Performed by: INTERNAL MEDICINE

## 2022-07-28 PROCEDURE — 4010F ACE/ARB THERAPY RXD/TAKEN: CPT | Mod: CPTII,95,, | Performed by: INTERNAL MEDICINE

## 2022-07-28 PROCEDURE — 4010F PR ACE/ARB THEARPY RXD/TAKEN: ICD-10-PCS | Mod: CPTII,95,, | Performed by: INTERNAL MEDICINE

## 2022-07-28 PROCEDURE — 3066F PR DOCUMENTATION OF TREATMENT FOR NEPHROPATHY: ICD-10-PCS | Mod: CPTII,95,, | Performed by: INTERNAL MEDICINE

## 2022-07-28 NOTE — PROGRESS NOTES
Subjective:       Patient ID: Nathalie Roland is a 31 y.o. White female who presents for follow-up evaluation of Polycystic Kidney Disease and Chronic Kidney Disease    Previously followed by Dr. King      2019:  Patient denies any complaints today. She is currently not interested in staring tolvaptan since she is considering another child.   2019:  Patient is feeling fine, no complaints.  She has not made a decision about tolvaptan since she is considering another child.   May 23, 2020:  Patient is feeling fine, Creatinine at 1.4 today. Patient is requesting repeat MRA since her aunt  from cerebral aneurysm.   2020:  Creatinine stable at 1.3.      Interval history May 2021: New Pt to Dr. Feng   The patient location is: Home  The chief complaint leading to consultation is: PKD, CKD  Visit type: audiovisual  Face to Face time with patient: 32 min  61 minutes of total time spent on the encounter, which includes face to face time and non-face to face time preparing to see the patient (eg, review of tests), Obtaining and/or reviewing separately obtained history, Documenting clinical information in the electronic or other health record, Independently interpreting results (not separately reported) and communicating results to the patient/family/caregiver, or Care coordination (not separately reported).   Each patient to whom he or she provides medical services by telemedicine is:  (1) informed of the relationship between the physician and patient and the respective role of any other health care provider with respect to management of the patient; and (2) notified that he or she may decline to receive medical services by telemedicine and may withdraw from such care at any time.  Notes: She is doing well. Last labs are from  HISTORY AUG 2021  The patient location is: Workplace  The chief complaint leading to consultation is: CKD and PKD  Visit  type: audiovisual  54 minutes of total time spent on the encounter, which includes face to face time and non-face to face time preparing to see the patient (eg, review of tests), Obtaining and/or reviewing separately obtained history, Documenting clinical information in the electronic or other health record, Independently interpreting results (not separately reported) and communicating results to the patient/family/caregiver, or Care coordination (not separately reported).   Each patient to whom he or she provides medical services by telemedicine is:  (1) informed of the relationship between the physician and patient and the respective role of any other health care provider with respect to management of the patient; and (2) notified that he or she may decline to receive medical services by telemedicine and may withdraw from such care at any time.  Notes: She sent her information for [facility] . Overall she feels well. No LE edema. BP is fine. No flank pain. No new HAs/change in HAs. Mom and GF are pre-DM. Her mother is s/p kidney txp! About 4 weeks out     Interval History July 2022:  The patient location is: Work  The chief complaint leading to consultation is: PKD  Visit type: audiovisual  52 minutes of total time spent on the encounter, which includes face to face time and non-face to face time preparing to see the patient (eg, review of tests), Obtaining and/or reviewing separately obtained history, Documenting clinical information in the electronic or other health record, Independently interpreting results (not separately reported) and communicating results to the patient/family/caregiver, or Care coordination (not separately reported).   Each patient to whom he or she provides medical services by telemedicine is:  (1) informed of the relationship between the physician and patient and the respective role of any other health care provider with respect to management of the patient; and (2) notified that he or  she may decline to receive medical services by telemedicine and may withdraw from such care at any time.  Notes: She and her family in January had COVID, no hospital stay. Increasing ace helped BPs. Overall feeling well. Her mother is one year out from (second) txp           Family History   Problem Relation Age of Onset    Polycystic kidney disease Mother     Hypertension Mother     Cancer Mother         thyroid cancer    Polycystic kidney disease Maternal Uncle     Hypertension Maternal Uncle     Polycystic kidney disease Maternal Grandfather     Hypertension Maternal Grandfather     Cancer Paternal Grandmother         breast cancer    Hypertension Paternal Grandmother     Heart failure Paternal Grandmother     Breast cancer Paternal Grandmother     Heart attack Paternal Grandfather     Hypertension Paternal Grandfather     No Known Problems Father     Polycystic kidney disease Maternal Aunt     Bipolar disorder Paternal Aunt     Colon cancer Neg Hx     Uterine cancer Neg Hx     Ovarian cancer Neg Hx          Review of Systems   Constitutional: Negative for activity change, appetite change, fatigue and unexpected weight change.   HENT: Negative for facial swelling.    Respiratory: Negative for shortness of breath.    Cardiovascular: Negative for chest pain and leg swelling.   Gastrointestinal: Negative for abdominal pain.   Genitourinary: Negative for flank pain and hematuria.   Neurological: Positive for headaches.   Psychiatric/Behavioral: Negative for confusion and decreased concentration.       Objective:      Physical Exam  Vitals and nursing note reviewed.   Constitutional:       General: She is not in acute distress.     Appearance: She is not ill-appearing.   HENT:      Head: Atraumatic.   Eyes:      General: No scleral icterus.  Pulmonary:      Effort: No respiratory distress.   Skin:     Coloration: Skin is not jaundiced.   Neurological:      Mental Status: She is alert and oriented to  person, place, and time. Mental status is at baseline.   Psychiatric:         Mood and Affect: Mood normal.         Behavior: Behavior normal.         Thought Content: Thought content normal.         Judgment: Judgment normal.         Assessment:       1. Stage 3a chronic kidney disease    2. Polycystic kidney disease    3. Persistent proteinuria    4. Metabolic acidosis    5. Vitamin D deficiency    6. Hypertension, renal    7. Other migraine without status migrainosus, not intractable        Plan:           ASSESSMENT AND PLAN:  30 y.o. year old female with history of PCKD, HTN, migraines, proteinuria presents to the renal clinic for evaluation of renal insufficiency.    PKD  - progression of kidney disease, as expected   - exhibits intermittent proteinuria  - Continue RAAS blockade   - renal u/s reviewed with pt, dimensions needed for volume not measured     HTN  - monitor    Min[facility] and Bone Disease  - continue D3, check level   - Secondary Hyperparathyroidism, trend PTH  - cotninue exogenous bicarbonate for metabolic acidosis      Screen for DM 2 negative, 5.0, 67/2021      RTC 6mo or prn sooner

## 2022-08-02 ENCOUNTER — PATIENT MESSAGE (OUTPATIENT)
Dept: NEPHROLOGY | Facility: CLINIC | Age: 32
End: 2022-08-02
Payer: COMMERCIAL

## 2022-08-08 ENCOUNTER — PATIENT MESSAGE (OUTPATIENT)
Dept: NEPHROLOGY | Facility: CLINIC | Age: 32
End: 2022-08-08
Payer: COMMERCIAL

## 2022-09-26 ENCOUNTER — PATIENT MESSAGE (OUTPATIENT)
Dept: INTERNAL MEDICINE | Facility: CLINIC | Age: 32
End: 2022-09-26
Payer: COMMERCIAL

## 2022-09-26 RX ORDER — ALPRAZOLAM 0.25 MG/1
0.25 TABLET ORAL 2 TIMES DAILY PRN
Qty: 10 TABLET | Refills: 0 | OUTPATIENT
Start: 2022-09-26 | End: 2022-10-26

## 2022-09-26 NOTE — TELEPHONE ENCOUNTER
No new care gaps identified.  [facility] Embedded Care Gaps. Reference number: [id number]. 9/26/2022   10:46:49 AM ALEKSANDERT

## 2022-09-28 ENCOUNTER — OFFICE VISIT (OUTPATIENT)
Dept: INTERNAL MEDICINE | Facility: CLINIC | Age: 32
End: 2022-09-28
Payer: COMMERCIAL

## 2022-09-28 ENCOUNTER — PATIENT MESSAGE (OUTPATIENT)
Dept: INTERNAL MEDICINE | Facility: CLINIC | Age: 32
End: 2022-09-28

## 2022-09-28 DIAGNOSIS — F40.243 FEAR OF FLYING: ICD-10-CM

## 2022-09-28 DIAGNOSIS — F41.9 ANXIETY: Primary | ICD-10-CM

## 2022-09-28 PROCEDURE — 99214 OFFICE O/P EST MOD 30 MIN: CPT | Mod: 95,,, | Performed by: NURSE PRACTITIONER

## 2022-09-28 PROCEDURE — 4010F PR ACE/ARB THEARPY RXD/TAKEN: ICD-10-PCS | Mod: CPTII,95,, | Performed by: NURSE PRACTITIONER

## 2022-09-28 PROCEDURE — 99214 PR OFFICE/OUTPT VISIT, EST, LEVL IV, 30-39 MIN: ICD-10-PCS | Mod: 95,,, | Performed by: NURSE PRACTITIONER

## 2022-09-28 PROCEDURE — 1160F PR REVIEW ALL MEDS BY PRESCRIBER/CLIN PHARMACIST DOCUMENTED: ICD-10-PCS | Mod: CPTII,95,, | Performed by: NURSE PRACTITIONER

## 2022-09-28 PROCEDURE — 1160F RVW MEDS BY RX/DR IN RCRD: CPT | Mod: CPTII,95,, | Performed by: NURSE PRACTITIONER

## 2022-09-28 PROCEDURE — 1159F PR MEDICATION LIST DOCUMENTED IN MEDICAL RECORD: ICD-10-PCS | Mod: CPTII,95,, | Performed by: NURSE PRACTITIONER

## 2022-09-28 PROCEDURE — 1159F MED LIST DOCD IN RCRD: CPT | Mod: CPTII,95,, | Performed by: NURSE PRACTITIONER

## 2022-09-28 PROCEDURE — 3066F PR DOCUMENTATION OF TREATMENT FOR NEPHROPATHY: ICD-10-PCS | Mod: CPTII,95,, | Performed by: NURSE PRACTITIONER

## 2022-09-28 PROCEDURE — 4010F ACE/ARB THERAPY RXD/TAKEN: CPT | Mod: CPTII,95,, | Performed by: NURSE PRACTITIONER

## 2022-09-28 PROCEDURE — 3066F NEPHROPATHY DOC TX: CPT | Mod: CPTII,95,, | Performed by: NURSE PRACTITIONER

## 2022-09-28 RX ORDER — ALPRAZOLAM 0.25 MG/1
0.25 TABLET ORAL 2 TIMES DAILY PRN
Qty: 10 TABLET | Refills: 0 | Status: SHIPPED | OUTPATIENT
Start: 2022-09-28 | End: 2023-01-23

## 2022-09-28 NOTE — PROGRESS NOTES
Subjective:       Patient ID: Nathalie Roland is a 32 y.o. female.    Chief Complaint: Follow-up    The patient location is: parked car  The chief complaint leading to consultation is: anxiety    Visit type: audiovisual    Face to Face time with patient: 15 min  20 minutes of total time spent on the encounter, which includes face to face time and non-face to face time preparing to see the patient (eg, review of tests), Obtaining and/or reviewing separately obtained history, Documenting clinical information in the electronic or other health record, Independently interpreting results (not separately reported) and communicating results to the patient/family/caregiver, or Care coordination (not separately reported).         Each patient to whom he or she provides medical services by telemedicine is:  (1) informed of the relationship between the physician and patient and the respective role of any other health care provider with respect to management of the patient; and (2) notified that he or she may decline to receive medical services by telemedicine and may withdraw from such care at any time.    Notes:   Patient doing virtual visit for flight anxiety  Leaving tomorrow for a trip to [place]  Uses xanax for flight anxiety and needs a refill        Follow-up  Pertinent negatives include no arthralgias, chest pain, headaches, joint swelling, neck pain, vomiting or weakness.     There were no vitals taken for this visit.    Review of Systems   Constitutional:  Negative for activity change and unexpected weight change.   HENT:  Negative for hearing loss, rhinorrhea and trouble swallowing.    Eyes:  Negative for discharge and visual disturbance.   Respiratory:  Negative for chest tightness and wheezing.    Cardiovascular:  Negative for chest pain and palpitations.   Gastrointestinal:  Negative for blood in stool, constipation, diarrhea and vomiting.   Endocrine: Negative for polydipsia and polyuria.    Genitourinary:  Negative for difficulty urinating, dysuria, hematuria and menstrual problem.   Musculoskeletal:  Negative for arthralgias, joint swelling and neck pain.   Neurological:  Negative for weakness and headaches.   Psychiatric/Behavioral:  Negative for confusion and dysphoric mood.      Objective:      Physical Exam  Constitutional:       General: She is not in acute distress.     Appearance: Normal appearance. She is well-developed. She is not diaphoretic.   HENT:      Head: Normocephalic and atraumatic.      Right Ear: External ear normal.      Left Ear: External ear normal.   Eyes:      General: No scleral icterus.        Right eye: No discharge.         Left eye: No discharge.      Conjunctiva/sclera: Conjunctivae normal.   Pulmonary:      Effort: Pulmonary effort is normal. No tachypnea, accessory muscle usage or respiratory distress.      Breath sounds: No stridor.   Musculoskeletal:      Cervical back: Normal range of motion and neck supple.   Skin:     Findings: No rash.   Neurological:      Mental Status: She is alert. She is not disoriented.   Psychiatric:         Attention and Perception: She is attentive.         Mood and Affect: Mood normal. Mood is not anxious or depressed. Affect is not labile, blunt, angry or inappropriate.         Speech: Speech normal.         Behavior: Behavior normal.         Thought Content: Thought content normal.         Judgment: Judgment normal.       Assessment:       1. Anxiety    2. Fear of flying        Plan:       Nathalie was seen today for follow-up.    Diagnoses and all orders for this visit:    Anxiety    Fear of flying    Other orders  -     ALPRAZolam (XANAX) 0.25 MG tablet; Take 1 tablet (0.25 mg total) by mouth 2 (two) times daily as needed for Anxiety.  Chronic condition, not controlled   checked  Xanax prn flight anxiety

## 2022-12-27 ENCOUNTER — LAB VISIT (OUTPATIENT)
Dept: LAB | Facility: HOSPITAL | Age: 32
End: 2022-12-27
Attending: INTERNAL MEDICINE
Payer: COMMERCIAL

## 2022-12-27 DIAGNOSIS — E55.9 VITAMIN D DEFICIENCY: ICD-10-CM

## 2022-12-27 DIAGNOSIS — Q61.3 POLYCYSTIC KIDNEY DISEASE: ICD-10-CM

## 2022-12-27 DIAGNOSIS — N18.31 STAGE 3A CHRONIC KIDNEY DISEASE: ICD-10-CM

## 2022-12-27 LAB
25(OH)D3+25(OH)D2 SERPL-MCNC: 70 NG/ML (ref 30–96)
ALBUMIN SERPL BCP-MCNC: 3.8 G/DL (ref 3.5–5.2)
ANION GAP SERPL CALC-SCNC: 8 MMOL/L (ref 8–16)
BASOPHILS # BLD AUTO: 0.05 K/UL (ref 0–0.2)
BASOPHILS NFR BLD: 0.6 % (ref 0–1.9)
BUN SERPL-MCNC: 18 MG/DL (ref 6–20)
CALCIUM SERPL-MCNC: 9.2 MG/DL (ref 8.7–10.5)
CHLORIDE SERPL-SCNC: 106 MMOL/L (ref 95–110)
CO2 SERPL-SCNC: 22 MMOL/L (ref 23–29)
CREAT SERPL-MCNC: 1.8 MG/DL (ref 0.5–1.4)
DIFFERENTIAL METHOD: ABNORMAL
EOSINOPHIL # BLD AUTO: 0.4 K/UL (ref 0–0.5)
EOSINOPHIL NFR BLD: 4.1 % (ref 0–8)
ERYTHROCYTE [DISTWIDTH] IN BLOOD BY AUTOMATED COUNT: 12 % (ref 11.5–14.5)
EST. GFR  (NO RACE VARIABLE): 37.7 ML/MIN/1.73 M^2
GLUCOSE SERPL-MCNC: 96 MG/DL (ref 70–110)
HCT VFR BLD AUTO: 38.1 % (ref 37–48.5)
HGB BLD-MCNC: 11.7 G/DL (ref 12–16)
IMM GRANULOCYTES # BLD AUTO: 0.08 K/UL (ref 0–0.04)
IMM GRANULOCYTES NFR BLD AUTO: 0.9 % (ref 0–0.5)
LYMPHOCYTES # BLD AUTO: 2.6 K/UL (ref 1–4.8)
LYMPHOCYTES NFR BLD: 30.3 % (ref 18–48)
MCH RBC QN AUTO: 29.5 PG (ref 27–31)
MCHC RBC AUTO-ENTMCNC: 30.7 G/DL (ref 32–36)
MCV RBC AUTO: 96 FL (ref 82–98)
MONOCYTES # BLD AUTO: 0.5 K/UL (ref 0.3–1)
MONOCYTES NFR BLD: 6 % (ref 4–15)
NEUTROPHILS # BLD AUTO: 4.9 K/UL (ref 1.8–7.7)
NEUTROPHILS NFR BLD: 58.1 % (ref 38–73)
NRBC BLD-RTO: 0 /100 WBC
PHOSPHATE SERPL-MCNC: 3.4 MG/DL (ref 2.7–4.5)
PLATELET # BLD AUTO: 264 K/UL (ref 150–450)
PMV BLD AUTO: 11.3 FL (ref 9.2–12.9)
POTASSIUM SERPL-SCNC: 4.9 MMOL/L (ref 3.5–5.1)
PTH-INTACT SERPL-MCNC: 105.7 PG/ML (ref 9–77)
RBC # BLD AUTO: 3.96 M/UL (ref 4–5.4)
SODIUM SERPL-SCNC: 136 MMOL/L (ref 136–145)
WBC # BLD AUTO: 8.49 K/UL (ref 3.9–12.7)

## 2022-12-27 PROCEDURE — 82306 VITAMIN D 25 HYDROXY: CPT | Performed by: INTERNAL MEDICINE

## 2022-12-27 PROCEDURE — 85025 COMPLETE CBC W/AUTO DIFF WBC: CPT | Performed by: INTERNAL MEDICINE

## 2022-12-27 PROCEDURE — 83970 ASSAY OF PARATHORMONE: CPT | Performed by: INTERNAL MEDICINE

## 2022-12-27 PROCEDURE — 36415 COLL VENOUS BLD VENIPUNCTURE: CPT | Mod: PO | Performed by: INTERNAL MEDICINE

## 2022-12-27 PROCEDURE — 80069 RENAL FUNCTION PANEL: CPT | Performed by: INTERNAL MEDICINE

## 2023-01-03 ENCOUNTER — OFFICE VISIT (OUTPATIENT)
Dept: NEPHROLOGY | Facility: CLINIC | Age: 33
End: 2023-01-03
Payer: COMMERCIAL

## 2023-01-03 DIAGNOSIS — R80.1 PERSISTENT PROTEINURIA: ICD-10-CM

## 2023-01-03 DIAGNOSIS — E87.20 METABOLIC ACIDOSIS: ICD-10-CM

## 2023-01-03 DIAGNOSIS — I12.9 HYPERTENSION, RENAL: ICD-10-CM

## 2023-01-03 DIAGNOSIS — N18.31 STAGE 3A CHRONIC KIDNEY DISEASE: Primary | ICD-10-CM

## 2023-01-03 DIAGNOSIS — Q61.3 POLYCYSTIC KIDNEY DISEASE: ICD-10-CM

## 2023-01-03 DIAGNOSIS — N25.81 SECONDARY HYPERPARATHYROIDISM OF RENAL ORIGIN: ICD-10-CM

## 2023-01-03 DIAGNOSIS — E55.9 VITAMIN D DEFICIENCY: ICD-10-CM

## 2023-01-03 PROCEDURE — 3066F PR DOCUMENTATION OF TREATMENT FOR NEPHROPATHY: ICD-10-PCS | Mod: CPTII,95,, | Performed by: INTERNAL MEDICINE

## 2023-01-03 PROCEDURE — 99215 PR OFFICE/OUTPT VISIT, EST, LEVL V, 40-54 MIN: ICD-10-PCS | Mod: 95,,, | Performed by: INTERNAL MEDICINE

## 2023-01-03 PROCEDURE — 3066F NEPHROPATHY DOC TX: CPT | Mod: CPTII,95,, | Performed by: INTERNAL MEDICINE

## 2023-01-03 PROCEDURE — 99215 OFFICE O/P EST HI 40 MIN: CPT | Mod: 95,,, | Performed by: INTERNAL MEDICINE

## 2023-01-03 RX ORDER — CARVEDILOL 25 MG/1
25 TABLET ORAL 2 TIMES DAILY WITH MEALS
Qty: 180 TABLET | Refills: 1 | Status: SHIPPED | OUTPATIENT
Start: 2023-01-03 | End: 2023-09-14 | Stop reason: SDUPTHER

## 2023-01-03 NOTE — PROGRESS NOTES
Subjective:       Patient ID: Nathalie Roland is a 32 y.o. White female who presents for follow-up evaluation of Chronic Kidney Disease    Previously followed by Dr. King      2019:  Patient denies any complaints today. She is currently not interested in staring tolvaptan since she is considering another child.   2019:  Patient is feeling fine, no complaints.  She has not made a decision about tolvaptan since she is considering another child.   May 23, 2020:  Patient is feeling fine, Creatinine at 1.4 today. Patient is requesting repeat MRA since her aunt  from cerebral aneurysm.   2020:  Creatinine stable at 1.3.      Interval history May 2021: New Pt to Dr. Feng   The patient location is: Home  The chief complaint leading to consultation is: PKD, CKD  Visit type: audiovisual  Face to Face time with patient: 32 min  61 minutes of total time spent on the encounter, which includes face to face time and non-face to face time preparing to see the patient (eg, review of tests), Obtaining and/or reviewing separately obtained history, Documenting clinical information in the electronic or other health record, Independently interpreting results (not separately reported) and communicating results to the patient/family/caregiver, or Care coordination (not separately reported).   Each patient to whom he or she provides medical services by telemedicine is:  (1) informed of the relationship between the physician and patient and the respective role of any other health care provider with respect to management of the patient; and (2) notified that he or she may decline to receive medical services by telemedicine and may withdraw from such care at any time.  Notes: She is doing well. Last labs are from [facility] AUG 2021  The patient location is: Workplace  The chief complaint leading to consultation is: CKD and PKD  Visit type: audiovisual  54 minutes  of total time spent on the encounter, which includes face to face time and non-face to face time preparing to see the patient (eg, review of tests), Obtaining and/or reviewing separately obtained history, Documenting clinical information in the electronic or other health record, Independently interpreting results (not separately reported) and communicating results to the patient/family/caregiver, or Care coordination (not separately reported).   Each patient to whom he or she provides medical services by telemedicine is:  (1) informed of the relationship between the physician and patient and the respective role of any other health care provider with respect to management of the patient; and (2) notified that he or she may decline to receive medical services by telemedicine and may withdraw from such care at any time.  Notes: She sent her information for [facility] . Overall she feels well. No LE edema. BP is fine. No flank pain. No new HAs/change in HAs. Mom and GF are pre-DM. Her mother is s/p kidney txp! About 4 weeks out     Interval History July 2022:  The patient location is: Work  The chief complaint leading to consultation is: PKD  Visit type: audiovisual  52 minutes of total time spent on the encounter, which includes face to face time and non-face to face time preparing to see the patient (eg, review of tests), Obtaining and/or reviewing separately obtained history, Documenting clinical information in the electronic or other health record, Independently interpreting results (not separately reported) and communicating results to the patient/family/caregiver, or Care coordination (not separately reported).   Each patient to whom he or she provides medical services by telemedicine is:  (1) informed of the relationship between the physician and patient and the respective role of any other health care provider with respect to management of the patient; and (2) notified that he or she may decline to receive  medical services by telemedicine and may withdraw from such care at any time.  Notes: She and her family in January had COVID, no hospital stay. Increasing ace helped BPs. Overall feeling well. Her mother is one year out from (second) [facility]     Jan 2023:  The patient location is: [place]  The chief complaint leading to consultation is: PKD, CKD  Visit type: audiovisual  49 minutes of total time spent on the encounter, which includes face to face time and non-face to face time preparing to see the patient (eg, review of tests), Obtaining and/or reviewing separately obtained history, Documenting clinical information in the electronic or other health record, Independently interpreting results (not separately reported) and communicating results to the patient/family/caregiver, or Care coordination (not separately reported).   Each patient to whom he or she provides medical services by telemedicine is:  (1) informed of the relationship between the physician and patient and the respective role of any other health care provider with respect to management of the patient; and (2) notified that he or she may decline to receive medical services by telemedicine and may withdraw from such care at any time.  Notes:  She is feeling well. Now working in [facility], closer to home. Possibility of another child but not actively planning         Family History   Problem Relation Age of Onset    Polycystic kidney disease Mother     Hypertension Mother     Cancer Mother         thyroid cancer    Polycystic kidney disease Maternal Uncle     Hypertension Maternal Uncle     Polycystic kidney disease Maternal Grandfather     Hypertension Maternal Grandfather     Cancer Paternal Grandmother         breast cancer    Hypertension Paternal Grandmother     Heart failure Paternal Grandmother     Breast cancer Paternal Grandmother     Heart attack Paternal Grandfather     Hypertension Paternal Grandfather     No Known Problems Father     Polycystic  kidney disease Maternal Aunt     Bipolar disorder Paternal Aunt     Colon cancer Neg Hx     Uterine cancer Neg Hx     Ovarian cancer Neg Hx          Review of Systems   Constitutional:  Negative for activity change, appetite change, fatigue and unexpected weight change.   HENT:  Negative for facial swelling.    Respiratory:  Negative for shortness of breath.    Cardiovascular:  Negative for chest pain and leg swelling.   Gastrointestinal:  Negative for abdominal pain.   Genitourinary:  Negative for hematuria.   Psychiatric/Behavioral:  Negative for confusion and decreased concentration.      Objective:      Physical Exam  Vitals and nursing note reviewed.   Constitutional:       General: She is not in acute distress.     Appearance: She is not ill-appearing.   HENT:      Head: Atraumatic.   Eyes:      General: No scleral icterus.  Pulmonary:      Effort: No respiratory distress.   Neurological:      Mental Status: She is alert and oriented to person, place, and time. Mental status is at baseline.   Psychiatric:         Mood and Affect: Mood normal.         Behavior: Behavior normal.         Thought Content: Thought content normal.         Judgment: Judgment normal.       Assessment:       1. Stage 3a chronic kidney disease    2. Polycystic kidney disease    3. Hypertension, renal    4. Secondary hyperparathyroidism of renal origin    5. Vitamin D deficiency    6. Metabolic acidosis    7. Persistent proteinuria          Plan:           ASSESSMENT AND PLAN:  30 y.o. year old female with history of PCKD, HTN, migraines, proteinuria presents to the renal clinic for evaluation of renal insufficiency.    PKD  - progression of kidney disease, as expected   - exhibits intermittent proteinuria  - Continue RAAS blockade   - next renal us need volume measurements      HTN  - monitor    Mineral and Bone Disease  - continue D3, level is in target, monitor to not overshoot  - Secondary Hyperparathyroidism, trend PTH  - cotninue  exogenous bicarbonate for metabolic acidosis      Screen for DM 2 negative, 5.0, 67/2021      RTC 6mo or prn sooner

## 2023-01-23 ENCOUNTER — LAB VISIT (OUTPATIENT)
Dept: LAB | Facility: HOSPITAL | Age: 33
End: 2023-01-23
Attending: FAMILY MEDICINE
Payer: COMMERCIAL

## 2023-01-23 ENCOUNTER — OFFICE VISIT (OUTPATIENT)
Dept: INTERNAL MEDICINE | Facility: CLINIC | Age: 33
End: 2023-01-23
Payer: COMMERCIAL

## 2023-01-23 VITALS
HEART RATE: 77 BPM | SYSTOLIC BLOOD PRESSURE: 122 MMHG | DIASTOLIC BLOOD PRESSURE: 80 MMHG | BODY MASS INDEX: 27.3 KG/M2 | WEIGHT: 173.94 LBS | HEIGHT: 67 IN | TEMPERATURE: 98 F

## 2023-01-23 DIAGNOSIS — N25.81 SECONDARY HYPERPARATHYROIDISM OF RENAL ORIGIN: ICD-10-CM

## 2023-01-23 DIAGNOSIS — R00.2 PALPITATIONS: Primary | ICD-10-CM

## 2023-01-23 DIAGNOSIS — Z00.00 ANNUAL PHYSICAL EXAM: ICD-10-CM

## 2023-01-23 DIAGNOSIS — I12.9 HYPERTENSION, RENAL: ICD-10-CM

## 2023-01-23 DIAGNOSIS — N18.31 STAGE 3A CHRONIC KIDNEY DISEASE: ICD-10-CM

## 2023-01-23 DIAGNOSIS — Z00.00 ANNUAL PHYSICAL EXAM: Primary | ICD-10-CM

## 2023-01-23 PROCEDURE — 83036 HEMOGLOBIN GLYCOSYLATED A1C: CPT | Performed by: FAMILY MEDICINE

## 2023-01-23 PROCEDURE — 3066F NEPHROPATHY DOC TX: CPT | Mod: CPTII,S$GLB,, | Performed by: FAMILY MEDICINE

## 2023-01-23 PROCEDURE — 99395 PREV VISIT EST AGE 18-39: CPT | Mod: S$GLB,,, | Performed by: FAMILY MEDICINE

## 2023-01-23 PROCEDURE — 1159F MED LIST DOCD IN RCRD: CPT | Mod: CPTII,S$GLB,, | Performed by: FAMILY MEDICINE

## 2023-01-23 PROCEDURE — 99999 PR PBB SHADOW E&M-EST. PATIENT-LVL IV: CPT | Mod: PBBFAC,,, | Performed by: FAMILY MEDICINE

## 2023-01-23 PROCEDURE — 3008F BODY MASS INDEX DOCD: CPT | Mod: CPTII,S$GLB,, | Performed by: FAMILY MEDICINE

## 2023-01-23 PROCEDURE — 1159F PR MEDICATION LIST DOCUMENTED IN MEDICAL RECORD: ICD-10-PCS | Mod: CPTII,S$GLB,, | Performed by: FAMILY MEDICINE

## 2023-01-23 PROCEDURE — 1160F PR REVIEW ALL MEDS BY PRESCRIBER/CLIN PHARMACIST DOCUMENTED: ICD-10-PCS | Mod: CPTII,S$GLB,, | Performed by: FAMILY MEDICINE

## 2023-01-23 PROCEDURE — 86803 HEPATITIS C AB TEST: CPT | Performed by: FAMILY MEDICINE

## 2023-01-23 PROCEDURE — 84443 ASSAY THYROID STIM HORMONE: CPT | Performed by: FAMILY MEDICINE

## 2023-01-23 PROCEDURE — 3074F SYST BP LT 130 MM HG: CPT | Mod: CPTII,S$GLB,, | Performed by: FAMILY MEDICINE

## 2023-01-23 PROCEDURE — 3079F DIAST BP 80-89 MM HG: CPT | Mod: CPTII,S$GLB,, | Performed by: FAMILY MEDICINE

## 2023-01-23 PROCEDURE — 3066F PR DOCUMENTATION OF TREATMENT FOR NEPHROPATHY: ICD-10-PCS | Mod: CPTII,S$GLB,, | Performed by: FAMILY MEDICINE

## 2023-01-23 PROCEDURE — 36415 COLL VENOUS BLD VENIPUNCTURE: CPT | Mod: PO | Performed by: FAMILY MEDICINE

## 2023-01-23 PROCEDURE — 1160F RVW MEDS BY RX/DR IN RCRD: CPT | Mod: CPTII,S$GLB,, | Performed by: FAMILY MEDICINE

## 2023-01-23 PROCEDURE — 3008F PR BODY MASS INDEX (BMI) DOCUMENTED: ICD-10-PCS | Mod: CPTII,S$GLB,, | Performed by: FAMILY MEDICINE

## 2023-01-23 PROCEDURE — 99395 PR PREVENTIVE VISIT,EST,18-39: ICD-10-PCS | Mod: S$GLB,,, | Performed by: FAMILY MEDICINE

## 2023-01-23 PROCEDURE — 99999 PR PBB SHADOW E&M-EST. PATIENT-LVL IV: ICD-10-PCS | Mod: PBBFAC,,, | Performed by: FAMILY MEDICINE

## 2023-01-23 PROCEDURE — 3079F PR MOST RECENT DIASTOLIC BLOOD PRESSURE 80-89 MM HG: ICD-10-PCS | Mod: CPTII,S$GLB,, | Performed by: FAMILY MEDICINE

## 2023-01-23 PROCEDURE — 80061 LIPID PANEL: CPT | Performed by: FAMILY MEDICINE

## 2023-01-23 PROCEDURE — 3074F PR MOST RECENT SYSTOLIC BLOOD PRESSURE < 130 MM HG: ICD-10-PCS | Mod: CPTII,S$GLB,, | Performed by: FAMILY MEDICINE

## 2023-01-23 NOTE — PROGRESS NOTES
"Subjective:      Patient ID: Nathalie Roland is a 32 y.o. female.    Chief Complaint: Annual Exam    HPI  33 yo here for annual.  Followed by Nephro for polycystic kidney disease.  Taking meds//feeling well    Past Medical History:   Diagnosis Date    Anemia     Anxiety     Hypertension     renal hypertension    Migraine     Polycystic kidney disease      Family History   Problem Relation Age of Onset    Polycystic kidney disease Mother     Hypertension Mother     Cancer Mother         thyroid cancer    Polycystic kidney disease Maternal Uncle     Hypertension Maternal Uncle     Polycystic kidney disease Maternal Grandfather     Hypertension Maternal Grandfather     Cancer Paternal Grandmother         breast cancer    Hypertension Paternal Grandmother     Heart failure Paternal Grandmother     Breast cancer Paternal Grandmother     Heart attack Paternal Grandfather     Hypertension Paternal Grandfather     No Known Problems Father     Polycystic kidney disease Maternal Aunt     Bipolar disorder Paternal Aunt     Colon cancer Neg Hx     Uterine cancer Neg Hx     Ovarian cancer Neg Hx      Past Surgical History:   Procedure Laterality Date     SECTION      eye cyst      HERNIA REPAIR      UMBILICAL HERNIA REPAIR       Social History     Tobacco Use    Smoking status: Never    Smokeless tobacco: Never   Substance Use Topics    Alcohol use: Yes     Alcohol/week: 0.0 standard drinks     Comment: socially    Drug use: No       /80   Pulse 77   Temp 97.9 °F (36.6 °C) (Oral)   Ht 5' 7" (1.702 m)   Wt 78.9 kg (173 lb 15.1 oz)   LMP 2023 (Exact Date)   BMI 27.24 kg/m²     Review of Systems   Constitutional:  Negative for activity change.   HENT:  Negative for hearing loss and trouble swallowing.    Eyes:  Negative for discharge.   Respiratory:  Negative for chest tightness and wheezing.    Cardiovascular:  Negative for chest pain and palpitations.   Gastrointestinal:  Negative for " constipation, diarrhea and vomiting.   Genitourinary:  Negative for difficulty urinating and hematuria.   Neurological:  Negative for headaches.   Psychiatric/Behavioral:  Negative for dysphoric mood.      Objective:     Physical Exam  Constitutional:       General: She is not in acute distress.     Appearance: She is well-developed. She is not diaphoretic.   HENT:      Head: Normocephalic.   Eyes:      Conjunctiva/sclera: Conjunctivae normal.   Cardiovascular:      Rate and Rhythm: Normal rate and regular rhythm.   Pulmonary:      Effort: Pulmonary effort is normal. No tachypnea, accessory muscle usage or respiratory distress.   Musculoskeletal:      Cervical back: Neck supple.   Skin:     Coloration: Skin is not pale.   Neurological:      Mental Status: She is alert and oriented to person, place, and time.   Psychiatric:         Mood and Affect: Mood normal.         Behavior: Behavior normal.         Thought Content: Thought content normal.         Judgment: Judgment normal.       Lab Results   Component Value Date    WBC 8.49 12/27/2022    HGB 11.7 (L) 12/27/2022    HCT 38.1 12/27/2022     12/27/2022    CHOL 186 12/12/2013    TRIG 114 12/12/2013    HDL 52 12/12/2013    ALT 23 02/04/2021    AST 16 02/04/2021     12/27/2022    K 4.9 12/27/2022     12/27/2022    CREATININE 1.8 (H) 12/27/2022    BUN 18 12/27/2022    CO2 22 (L) 12/27/2022    TSH 2.546 09/28/2020    HGBA1C 5.0 06/23/2021       Assessment:     1. Annual physical exam    2. Hypertension, renal    3. Secondary hyperparathyroidism of renal origin    4. Stage 3a chronic kidney disease         Plan:     Annual physical exam  -     Hepatitis C Antibody; Future; Expected date: 01/23/2023  -     Hemoglobin A1C; Future; Expected date: 01/23/2023  -     TSH; Future; Expected date: 01/23/2023  -     Lipid Panel; Future; Expected date: 01/23/2023    Hypertension, renal    Secondary hyperparathyroidism of renal origin    Stage 3a chronic kidney  disease    Update screening labs  Cont meds  Healthy lifestyle  Cont per specialty  F/u annually and PRN

## 2023-01-24 LAB
CHOLEST SERPL-MCNC: 246 MG/DL (ref 120–199)
CHOLEST/HDLC SERPL: 5.6 {RATIO} (ref 2–5)
ESTIMATED AVG GLUCOSE: 105 MG/DL (ref 68–131)
HBA1C MFR BLD: 5.3 % (ref 4–5.6)
HCV AB SERPL QL IA: NORMAL
HDLC SERPL-MCNC: 44 MG/DL (ref 40–75)
HDLC SERPL: 17.9 % (ref 20–50)
LDLC SERPL CALC-MCNC: 165 MG/DL (ref 63–159)
NONHDLC SERPL-MCNC: 202 MG/DL
TRIGL SERPL-MCNC: 185 MG/DL (ref 30–150)
TSH SERPL DL<=0.005 MIU/L-ACNC: 1.59 UIU/ML (ref 0.4–4)

## 2023-01-30 ENCOUNTER — HOSPITAL ENCOUNTER (OUTPATIENT)
Dept: CARDIOLOGY | Facility: HOSPITAL | Age: 33
Discharge: HOME | End: 2023-01-30
Attending: INTERNAL MEDICINE
Payer: COMMERCIAL

## 2023-01-30 ENCOUNTER — OFFICE VISIT (OUTPATIENT)
Dept: CARDIOLOGY | Facility: CLINIC | Age: 33
End: 2023-01-30
Payer: COMMERCIAL

## 2023-01-30 VITALS
WEIGHT: 176.38 LBS | OXYGEN SATURATION: 100 % | HEART RATE: 67 BPM | SYSTOLIC BLOOD PRESSURE: 124 MMHG | BODY MASS INDEX: 27.62 KG/M2 | DIASTOLIC BLOOD PRESSURE: 80 MMHG

## 2023-01-30 DIAGNOSIS — D50.9 IRON DEFICIENCY ANEMIA, UNSPECIFIED IRON DEFICIENCY ANEMIA TYPE: ICD-10-CM

## 2023-01-30 DIAGNOSIS — R00.2 PALPITATIONS: Primary | ICD-10-CM

## 2023-01-30 DIAGNOSIS — E78.2 MIXED HYPERLIPIDEMIA: ICD-10-CM

## 2023-01-30 DIAGNOSIS — F41.9 ANXIETY: ICD-10-CM

## 2023-01-30 DIAGNOSIS — R00.2 PALPITATIONS: ICD-10-CM

## 2023-01-30 DIAGNOSIS — Q61.3 POLYCYSTIC KIDNEY DISEASE: ICD-10-CM

## 2023-01-30 DIAGNOSIS — G43.809 OTHER MIGRAINE WITHOUT STATUS MIGRAINOSUS, NOT INTRACTABLE: ICD-10-CM

## 2023-01-30 DIAGNOSIS — N18.31 STAGE 3A CHRONIC KIDNEY DISEASE: ICD-10-CM

## 2023-01-30 DIAGNOSIS — R00.0 TACHYCARDIA: ICD-10-CM

## 2023-01-30 DIAGNOSIS — I12.9 HYPERTENSION, RENAL: ICD-10-CM

## 2023-01-30 PROCEDURE — 3008F BODY MASS INDEX DOCD: CPT | Mod: CPTII,S$GLB,, | Performed by: INTERNAL MEDICINE

## 2023-01-30 PROCEDURE — 3066F PR DOCUMENTATION OF TREATMENT FOR NEPHROPATHY: ICD-10-PCS | Mod: CPTII,S$GLB,, | Performed by: INTERNAL MEDICINE

## 2023-01-30 PROCEDURE — 3044F HG A1C LEVEL LT 7.0%: CPT | Mod: CPTII,S$GLB,, | Performed by: INTERNAL MEDICINE

## 2023-01-30 PROCEDURE — 3074F SYST BP LT 130 MM HG: CPT | Mod: CPTII,S$GLB,, | Performed by: INTERNAL MEDICINE

## 2023-01-30 PROCEDURE — 1160F RVW MEDS BY RX/DR IN RCRD: CPT | Mod: CPTII,S$GLB,, | Performed by: INTERNAL MEDICINE

## 2023-01-30 PROCEDURE — 3079F DIAST BP 80-89 MM HG: CPT | Mod: CPTII,S$GLB,, | Performed by: INTERNAL MEDICINE

## 2023-01-30 PROCEDURE — 3008F PR BODY MASS INDEX (BMI) DOCUMENTED: ICD-10-PCS | Mod: CPTII,S$GLB,, | Performed by: INTERNAL MEDICINE

## 2023-01-30 PROCEDURE — 99214 OFFICE O/P EST MOD 30 MIN: CPT | Mod: 25,S$GLB,, | Performed by: INTERNAL MEDICINE

## 2023-01-30 PROCEDURE — 93010 EKG 12-LEAD: ICD-10-PCS | Mod: ,,, | Performed by: INTERNAL MEDICINE

## 2023-01-30 PROCEDURE — 3079F PR MOST RECENT DIASTOLIC BLOOD PRESSURE 80-89 MM HG: ICD-10-PCS | Mod: CPTII,S$GLB,, | Performed by: INTERNAL MEDICINE

## 2023-01-30 PROCEDURE — 99999 PR PBB SHADOW E&M-EST. PATIENT-LVL III: ICD-10-PCS | Mod: PBBFAC,,, | Performed by: INTERNAL MEDICINE

## 2023-01-30 PROCEDURE — 1159F MED LIST DOCD IN RCRD: CPT | Mod: CPTII,S$GLB,, | Performed by: INTERNAL MEDICINE

## 2023-01-30 PROCEDURE — 3044F PR MOST RECENT HEMOGLOBIN A1C LEVEL <7.0%: ICD-10-PCS | Mod: CPTII,S$GLB,, | Performed by: INTERNAL MEDICINE

## 2023-01-30 PROCEDURE — 3074F PR MOST RECENT SYSTOLIC BLOOD PRESSURE < 130 MM HG: ICD-10-PCS | Mod: CPTII,S$GLB,, | Performed by: INTERNAL MEDICINE

## 2023-01-30 PROCEDURE — 1159F PR MEDICATION LIST DOCUMENTED IN MEDICAL RECORD: ICD-10-PCS | Mod: CPTII,S$GLB,, | Performed by: INTERNAL MEDICINE

## 2023-01-30 PROCEDURE — 93005 ELECTROCARDIOGRAM TRACING: CPT | Mod: PO

## 2023-01-30 PROCEDURE — 1160F PR REVIEW ALL MEDS BY PRESCRIBER/CLIN PHARMACIST DOCUMENTED: ICD-10-PCS | Mod: CPTII,S$GLB,, | Performed by: INTERNAL MEDICINE

## 2023-01-30 PROCEDURE — 93010 ELECTROCARDIOGRAM REPORT: CPT | Mod: ,,, | Performed by: INTERNAL MEDICINE

## 2023-01-30 PROCEDURE — 99214 PR OFFICE/OUTPT VISIT, EST, LEVL IV, 30-39 MIN: ICD-10-PCS | Mod: 25,S$GLB,, | Performed by: INTERNAL MEDICINE

## 2023-01-30 PROCEDURE — 99999 PR PBB SHADOW E&M-EST. PATIENT-LVL III: CPT | Mod: PBBFAC,,, | Performed by: INTERNAL MEDICINE

## 2023-01-30 PROCEDURE — 3066F NEPHROPATHY DOC TX: CPT | Mod: CPTII,S$GLB,, | Performed by: INTERNAL MEDICINE

## 2023-01-30 RX ORDER — PRAVASTATIN SODIUM 40 MG/1
40 TABLET ORAL NIGHTLY
Qty: 90 TABLET | Refills: 1 | Status: SHIPPED | OUTPATIENT
Start: 2023-01-30 | End: 2023-06-22 | Stop reason: SDUPTHER

## 2023-01-30 NOTE — PROGRESS NOTES
"Subjective:   Patient ID:  Nathalie Roland is a 32 y.o. female who presents for cardiac consult of No chief complaint on file.    Referring Physician: Raul Santana MD   [id number] [place] [id number]    Reason for consult: palpitations.       Follow-up    The patient came in today for cardiac consult of No chief complaint on file.      Nathalie Roland is a 32 y.o. female pt with HTN, HLD, PCKD, migraines, anxiety, anemia presents for follow up CV eval.     3/8/21  ECG last month with NSR hr 81, low voltage, nonspec T wave changes. She has been on labetol since pregnancy, she started having palpitations a few weeks ago.   She feels it couple of times a day, unsure of trigger, sometimes after laying down or after eating food. Occ cough. Symptoms may last few min to 10 min. Daughter is 3 years.   She was diagnosed PCKD at age 13. Her mother on transplant list for kidney.     4/26/21  BP elevated 130s/96 today. ECHO and Holter neg last month. No CP/SOB. Feels well today.     8/2/21  Bp and HR well controlled. She still has occ palpitations maybe 10 min lasts occurring a few weeks. No CP/SOB.     11/1/21  From pt last month - "just completed a 20 min workout. It has been about an hour since then and I'm geart rate has not gone below 110 and I've been experiencing a lot of palpitations. They also have been more frequent recently. I'm wondering if there's something I should do. "  Told pt - take an extra half dose of carvedilol if rates remain over 100 but monitor pressures and heart rates.  Bp and HR stable today. Her HR occ 120s after work out.   ECG - sinus demetrius, V rate 59    5/9/22  ECG stress test neg for ischemia 11/2021. BP and HR well controlled today. She still has palpitations had rash with Bardy.     1/30/23  Vital monitor 5/2022 overall neg, rare ectopy, AVG HR 77 bpm. Recent lipids with elevated Tc 246, , . BP and HR well controlled. BMI 27 - 176 lbs. "     Patient feels  no chest pain, no sob, no leg swelling, no PND, no syncope, no CNS symptoms.     Patient has fairly good exercise tolerance. Works at [employer] for [employer].     Patient is compliant with medications.    FH - mother - had renal transplant for PCKD; HTN; grandfather - quad bypass    Vital 5/2022  Conclusion       The patient was monitored for a total of 13d 21h, underlying rhythm is Sinus.     The patient was monitored for a total of 13d 21h, underlying rhythm is Sinus.  The minimum heart rate was 46 bpm; the maximum 149 bpm; the average 77 bpm.  0 % of Atrial fibrillation/Atrial flutter with longest episode of 0 ms.  -- AV block with 0 %  There were 0 pauses, the longest pause was 0 ms at --.  0 episodes of VT were found with Longest VT at 0 s .  3 supraventricular episodes were found. Longest SVT Episode 4 beats  There were a total of 757 PVCs with 2 morphologies and 0 couplets. Overall PVC [place] at 0.05 %  There were a total of 21 PSVCs with 1 morphologies and 4 couplets. Overall PSVC [place] at < 0.01 %  There is a total of 10 patient events.    Results for orders placed during the hospital encounter of 11/11/21    Exercise Stress - EKG    Interpretation Summary    The EKG portion of this study is negative for ischemia.    The patient reported no chest pain during the stress test.    There were no arrhythmias during stress.    The exercise capacity was normal.      HOLTER 3/2021  The diary was properly completed, see below  There were rare PVCs totalling 219 and averaging 4.56 per hour.  VT There were 0 runs.  There were very rare PACs totalling 6 and averaging 0.13 per hour.  SVT There were 0 runs.    Results for orders placed during the hospital encounter of 03/18/21    Echo Color Flow Doppler? Yes    Interpretation Summary  · The left ventricle is normal in size with concentric remodeling and normal systolic function. The estimated ejection fraction is 55%  · Normal left  ventricular diastolic function.  · With normal right ventricular systolic function.  · Normal central venous pressure (3 mmHg).  · The estimated PA systolic pressure is 38 mmHg.      ECG  Normal sinus rhythm  Low voltage QRS  Nonspecific T wave abnormality  Abnormal ECG  When compared with ECG of 23-MAY-2017 15:50,  No significant change was found  Confirmed by MD BERNICE, REJI ([id number] on 2021 9:55:43 PM    Past Medical History:   Diagnosis Date    Anemia     Anxiety     Hypertension     renal hypertension    Migraine     Polycystic kidney disease        Past Surgical History:   Procedure Laterality Date     SECTION      eye cyst      HERNIA REPAIR      UMBILICAL HERNIA REPAIR         Social History     Tobacco Use    Smoking status: Never    Smokeless tobacco: Never   Substance Use Topics    Alcohol use: Yes     Alcohol/week: 0.0 standard drinks     Comment: socially    Drug use: No       Family History   Problem Relation Age of Onset    Polycystic kidney disease Mother     Hypertension Mother     Cancer Mother         thyroid cancer    Polycystic kidney disease Maternal Uncle     Hypertension Maternal Uncle     Polycystic kidney disease Maternal Grandfather     Hypertension Maternal Grandfather     Cancer Paternal Grandmother         breast cancer    Hypertension Paternal Grandmother     Heart failure Paternal Grandmother     Breast cancer Paternal Grandmother     Heart attack Paternal Grandfather     Hypertension Paternal Grandfather     No Known Problems Father     Polycystic kidney disease Maternal Aunt     Bipolar disorder Paternal Aunt     Colon cancer Neg Hx     Uterine cancer Neg Hx     Ovarian cancer Neg Hx        Patient's Medications   New Prescriptions    No medications on file   Previous Medications    CARVEDILOL (COREG) 25 MG TABLET    Take 1 tablet (25 mg total) by mouth 2 (two) times daily with meals.    ENALAPRIL (VASOTEC) 20 MG TABLET    TAKE 1 TABLET BY MOUTH TWICE DAILY    IRON-VITAMIN C  100-250 MG, ICAR-C, 100-250 MG TAB    TAKE 1 TABLET BY MOUTH EVERY DAY    SODIUM BICARBONATE 650 MG TABLET    TAKE 1 TABLET BY MOUTH TWICE DAILY FOR METABOLIC ACIDOSIS   Modified Medications    No medications on file   Discontinued Medications    No medications on file       Review of Systems   Constitutional: Negative.    HENT: Negative.     Eyes: Negative.    Respiratory: Negative.     Cardiovascular:  Positive for palpitations.   Gastrointestinal: Negative.    Genitourinary: Negative.    Musculoskeletal: Negative.    Skin: Negative.    Neurological: Negative.    Endo/Heme/Allergies: Negative.    Psychiatric/Behavioral: Negative.     All 12 systems otherwise negative.    Wt Readings from Last 3 Encounters:   01/23/23 78.9 kg (173 lb 15.1 oz)   05/09/22 82.2 kg (181 lb 3.5 oz)   03/25/22 80.9 kg (178 lb 5.6 oz)     Temp Readings from Last 3 Encounters:   01/23/23 97.9 °F (36.6 °C) (Oral)   03/25/22 97.7 °F (36.5 °C)   03/24/22 97.9 °F (36.6 °C) (Temporal)     BP Readings from Last 3 Encounters:   01/23/23 122/80   05/09/22 120/70   03/25/22 (!) 131/96     Pulse Readings from Last 3 Encounters:   01/23/23 77   05/09/22 84   03/25/22 90       LMP 01/13/2023 (Exact Date)     Objective:   Physical Exam  Vitals and nursing note reviewed.   Constitutional:       General: She is not in acute distress.     Appearance: She is well-developed. She is not diaphoretic.   HENT:      Head: Normocephalic and atraumatic.      Nose: Nose normal.   Eyes:      General: No scleral icterus.     Conjunctiva/sclera: Conjunctivae normal.   Neck:      Thyroid: No thyromegaly.      Vascular: No JVD.   Cardiovascular:      Rate and Rhythm: Normal rate and regular rhythm.      Heart sounds: S1 normal and S2 normal. No murmur heard.    No friction rub. No gallop. No S3 or S4 sounds.   Pulmonary:      Effort: Pulmonary effort is normal. No respiratory distress.      Breath sounds: Normal breath sounds. No stridor. No wheezing or rales.   Chest:       Chest wall: No tenderness.   Abdominal:      General: Bowel sounds are normal. There is no distension.      Palpations: Abdomen is soft. There is no mass.      Tenderness: There is no abdominal tenderness. There is no rebound.   Genitourinary:     Comments: Deferred  Musculoskeletal:         General: No tenderness or deformity. Normal range of motion.      Cervical back: Normal range of motion and neck supple.   Lymphadenopathy:      Cervical: No cervical adenopathy.   Skin:     General: Skin is warm and dry.      Coloration: Skin is not pale.      Findings: No erythema or rash.   Neurological:      Mental Status: She is alert and oriented to person, place, and time.      Motor: No abnormal muscle tone.      Coordination: Coordination normal.   Psychiatric:         Behavior: Behavior normal.         Thought Content: Thought content normal.         Judgment: Judgment normal.       Lab Results   Component Value Date     12/27/2022    K 4.9 12/27/2022     12/27/2022    CO2 22 (L) 12/27/2022    BUN 18 12/27/2022    CREATININE 1.8 (H) 12/27/2022    GLU 96 12/27/2022    HGBA1C 5.3 01/23/2023    MG 2.1 08/27/2018    AST 16 02/04/2021    ALT 23 02/04/2021    ALBUMIN 3.8 12/27/2022    PROT 6.5 02/04/2021    BILITOT 0.5 02/04/2021    WBC 8.49 12/27/2022    HGB 11.7 (L) 12/27/2022    HCT 38.1 12/27/2022    MCV 96 12/27/2022     12/27/2022    TSH 1.590 01/23/2023    CHOL 246 (H) 01/23/2023    HDL 44 01/23/2023    LDLCALC 165.0 (H) 01/23/2023    TRIG 185 (H) 01/23/2023     Assessment:      1. Palpitations    2. Hypertension, renal    3. Tachycardia    4. Polycystic kidney disease    5. Stage 3a chronic kidney disease    6. Other migraine without status migrainosus, not intractable    7. Anxiety    8. Iron deficiency anemia, unspecified iron deficiency anemia type          Plan:     1. Palpitations with tachycardia  - change labetelol to coreg BID  - ECHO and Holter - neg  - Vital monitor 5/2022 overall  neg, rare ectopy, AVG HR 77 bpm.   - ECG stress test neg 11/2021    2. HTN - stable  - cont meds and titrate  - increased Coreg    3. PCKD with CKD  - cont to monitor  - f/u with PCP/Nephro as needed    4. Anxiety, migraines  - cont tx     5. Anemia, Fe def  - cont tx     6. HLD   -  elevated Tc 246, , .   - start Pravastatin 40mg , repeat labs in 3 months    Thank you for allowing me to participate in this patient's care. Please do not hesitate to contact me with any questions or concerns. Consult note has been forwarded to the referral physician.

## 2023-03-13 ENCOUNTER — OFFICE VISIT (OUTPATIENT)
Dept: DERMATOLOGY | Facility: CLINIC | Age: 33
End: 2023-03-13
Payer: COMMERCIAL

## 2023-03-13 DIAGNOSIS — L81.9 DYSCHROMIA: ICD-10-CM

## 2023-03-13 DIAGNOSIS — L71.9 ROSACEA: Primary | ICD-10-CM

## 2023-03-13 DIAGNOSIS — D22.9 MULTIPLE NEVI: ICD-10-CM

## 2023-03-13 PROCEDURE — 99999 PR PBB SHADOW E&M-EST. PATIENT-LVL III: CPT | Mod: PBBFAC,,, | Performed by: DERMATOLOGY

## 2023-03-13 PROCEDURE — 1160F RVW MEDS BY RX/DR IN RCRD: CPT | Mod: CPTII,S$GLB,, | Performed by: DERMATOLOGY

## 2023-03-13 PROCEDURE — 3044F PR MOST RECENT HEMOGLOBIN A1C LEVEL <7.0%: ICD-10-PCS | Mod: CPTII,S$GLB,, | Performed by: DERMATOLOGY

## 2023-03-13 PROCEDURE — 99214 PR OFFICE/OUTPT VISIT, EST, LEVL IV, 30-39 MIN: ICD-10-PCS | Mod: S$GLB,,, | Performed by: DERMATOLOGY

## 2023-03-13 PROCEDURE — 3066F NEPHROPATHY DOC TX: CPT | Mod: CPTII,S$GLB,, | Performed by: DERMATOLOGY

## 2023-03-13 PROCEDURE — 99999 PR PBB SHADOW E&M-EST. PATIENT-LVL III: ICD-10-PCS | Mod: PBBFAC,,, | Performed by: DERMATOLOGY

## 2023-03-13 PROCEDURE — 1159F PR MEDICATION LIST DOCUMENTED IN MEDICAL RECORD: ICD-10-PCS | Mod: CPTII,S$GLB,, | Performed by: DERMATOLOGY

## 2023-03-13 PROCEDURE — 1159F MED LIST DOCD IN RCRD: CPT | Mod: CPTII,S$GLB,, | Performed by: DERMATOLOGY

## 2023-03-13 PROCEDURE — 3044F HG A1C LEVEL LT 7.0%: CPT | Mod: CPTII,S$GLB,, | Performed by: DERMATOLOGY

## 2023-03-13 PROCEDURE — 99214 OFFICE O/P EST MOD 30 MIN: CPT | Mod: S$GLB,,, | Performed by: DERMATOLOGY

## 2023-03-13 PROCEDURE — 3066F PR DOCUMENTATION OF TREATMENT FOR NEPHROPATHY: ICD-10-PCS | Mod: CPTII,S$GLB,, | Performed by: DERMATOLOGY

## 2023-03-13 PROCEDURE — 1160F PR REVIEW ALL MEDS BY PRESCRIBER/CLIN PHARMACIST DOCUMENTED: ICD-10-PCS | Mod: CPTII,S$GLB,, | Performed by: DERMATOLOGY

## 2023-03-13 RX ORDER — METRONIDAZOLE 7.5 MG/G
CREAM TOPICAL
Qty: 45 G | Refills: 5 | Status: SHIPPED | OUTPATIENT
Start: 2023-03-13 | End: 2024-03-26

## 2023-03-13 NOTE — PATIENT INSTRUCTIONS
Monitoring Moles  Moles, also called nevi, are small, pigmented (colored) marks on the skin. They have no known purpose. Many moles appear before age 30, but they also increase frequently as people age. Moles most often are benign (not cancer) and harmless. But some become cancerous. Thats why you need to watch the moles on your body and tell your health care provider about any concern you.    What are moles?  Moles are a type of pigmented alessandro. Freckles, which often are sprinkled across the bridge of the nose, the cheeks, and the arms, are another type of pigmented alessandro. Moles can appear on any part of the body. There are many types, sizes, and shapes of moles. Most moles are solid brown. In most cases, they are flat or dome-shaped, smooth, and have well-defined edges. Freckles are flat.  Why worry about moles?  Most moles are benign and dont require treatment. You can have moles removed if you dont like the way they look or feel. But moles that appear after you are 30 or that change in certain ways may become a problem. These moles may turn into melanoma, a type of skin cancer. Melanoma is one of the fastest growing cancers in the [place], but it is often curable if caught early. But this disease can be life-threatening, particularly when not diagnosed early. The more moles someone has, the higher the risk. Risk is also higher for those who have had more lifetime exposure to the sun, severe blistering sunburns, exposure to tanning beds, a prior personal history of cancer, and those with a family history of skin cancer. To manage your risk, its smart to check your moles for changes and ask your health care provider to perform a thorough skin exam when you have a physical exam. To do this, you first need to learn where your moles are. Then, be sure to check your moles each month.    Checking your moles  You can check many of your moles each month. You can do this right after you shower and before you get  dressed. Check your body from head to toe. Then, make a list of your moles. If you find any new moles or changes in your moles, call your health care provider. To check your moles, youll need:  A full-length mirror  A stool or chair to sit on while you check your feet  If you have a lot of moles, take digital photos of them each month. Make sure to take photos both up close and from a distance. These can help you see if any moles change over time.  When to seek medical treatment  See your health care provider if your moles hurt, itch, ooze, bleed, thicken, become crusty, or show other changes. Also, be sure to call your health care provider if your moles show any of the following signs of melanoma:  A change in size, shape, color, or elevation  Asymmetry (when the sides dont match)  Ragged, notched, or blurred borders  Varied colors within the same mole  Size is larger than 5 mm or 6 mm in diameter (the size of a pencil eraser)  © 5224-7605 [employer]. [address] [place] [id number]. All rights reserved. This information is not intended as a substitute for professional medical care. Always follow your healthcare professional's instructions.

## 2023-03-13 NOTE — PROGRESS NOTES
Subjective:       Patient ID:  Nathalie Roland is a 32 y.o. female who presents for   Chief Complaint   Patient presents with    Skin Check     FBSE    Denies personal hx of skin cancer. Pt reports mother had skin cancer but is unsure of the type.      Hx of seb derm and multiple nevi, last seen on 3/8/21.  She c/o insect bite of the right foot, 9 months ago, + irritation at the time, c/o residual discoloration.      Denies bleeding, tender, growing, or concerning lesions.     The patient denies personal history of skin cancer. Mother c/ hx of skin CA (transplant patient).         Review of Systems   Constitutional:  Negative for fever and chills.   Gastrointestinal:  Negative for nausea and vomiting.   Skin:  Positive for activity-related sunscreen use. Negative for daily sunscreen use and recent sunburn.   Hematologic/Lymphatic: Does not bruise/bleed easily.      Objective:    Physical Exam   Constitutional: She appears well-developed and well-nourished. No distress.   Neurological: She is alert and oriented to person, place, and time. She is not disoriented.   Psychiatric: She has a normal mood and affect.   Skin:   Areas Examined (abnormalities noted in diagram):   Scalp / Hair Palpated and Inspected  Head / Face Inspection Performed  Neck Inspection Performed  Chest / Axilla Inspection Performed  Abdomen Inspection Performed  Genitals / Buttocks / Groin Inspection Performed  Back Inspection Performed  RUE Inspected  LUE Inspection Performed  RLE Inspected  LLE Inspection Performed  Nails and Digits Inspection Performed                 Diagram Legend     Evenly pigmented macule c/w junctional nevus     Flesh colored to evenly pigmented papule c/w intradermal nevus      Open and closed comedones      Inflammatory papules and pustules       Assessment / Plan:        Rosacea  -     metronidazole 0.75% (METROCREAM) 0.75 % Crea; AAA bid for rosacea/redness  Dispense: 45 g; Refill: 5  -     discussed dx  and importance of daily sunscreen. Will start above med.     Dyschromia  Of the right foot, s/p ABR.  Reassurance provided. Consider lightening cream if cosmetic concern.    Multiple nevi  Reassurance given.  Discussed ABCDEF of melanoma and changes for patient to look for.   Discussed importance of daily use of sunscreen which is broad-spectrum and has a minimum SPF of 30.             Follow up in about 1 year (around 3/13/2024).

## 2023-05-01 ENCOUNTER — LAB VISIT (OUTPATIENT)
Dept: LAB | Facility: HOSPITAL | Age: 33
End: 2023-05-01
Attending: INTERNAL MEDICINE
Payer: COMMERCIAL

## 2023-05-01 DIAGNOSIS — E78.2 MIXED HYPERLIPIDEMIA: ICD-10-CM

## 2023-05-01 LAB
ALBUMIN SERPL BCP-MCNC: 3.8 G/DL (ref 3.5–5.2)
ALP SERPL-CCNC: 38 U/L (ref 55–135)
ALT SERPL W/O P-5'-P-CCNC: 12 U/L (ref 10–44)
ANION GAP SERPL CALC-SCNC: 5 MMOL/L (ref 8–16)
AST SERPL-CCNC: 13 U/L (ref 10–40)
BILIRUB SERPL-MCNC: 0.6 MG/DL (ref 0.1–1)
BUN SERPL-MCNC: 21 MG/DL (ref 6–20)
CALCIUM SERPL-MCNC: 9.2 MG/DL (ref 8.7–10.5)
CHLORIDE SERPL-SCNC: 106 MMOL/L (ref 95–110)
CHOLEST SERPL-MCNC: 144 MG/DL (ref 120–199)
CHOLEST/HDLC SERPL: 3.5 {RATIO} (ref 2–5)
CK SERPL-CCNC: 42 U/L (ref 20–180)
CO2 SERPL-SCNC: 25 MMOL/L (ref 23–29)
CREAT SERPL-MCNC: 1.8 MG/DL (ref 0.5–1.4)
EST. GFR  (NO RACE VARIABLE): 37.7 ML/MIN/1.73 M^2
GLUCOSE SERPL-MCNC: 93 MG/DL (ref 70–110)
HDLC SERPL-MCNC: 41 MG/DL (ref 40–75)
HDLC SERPL: 28.5 % (ref 20–50)
LDLC SERPL CALC-MCNC: 68.2 MG/DL (ref 63–159)
NONHDLC SERPL-MCNC: 103 MG/DL
POTASSIUM SERPL-SCNC: 4.2 MMOL/L (ref 3.5–5.1)
PROT SERPL-MCNC: 6.4 G/DL (ref 6–8.4)
SODIUM SERPL-SCNC: 136 MMOL/L (ref 136–145)
TRIGL SERPL-MCNC: 174 MG/DL (ref 30–150)

## 2023-05-01 PROCEDURE — 80061 LIPID PANEL: CPT | Performed by: INTERNAL MEDICINE

## 2023-05-01 PROCEDURE — 82550 ASSAY OF CK (CPK): CPT | Performed by: INTERNAL MEDICINE

## 2023-05-01 PROCEDURE — 36415 COLL VENOUS BLD VENIPUNCTURE: CPT | Mod: PO | Performed by: INTERNAL MEDICINE

## 2023-05-01 PROCEDURE — 80053 COMPREHEN METABOLIC PANEL: CPT | Performed by: INTERNAL MEDICINE

## 2023-06-12 ENCOUNTER — PATIENT MESSAGE (OUTPATIENT)
Dept: NEPHROLOGY | Facility: CLINIC | Age: 33
End: 2023-06-12
Payer: COMMERCIAL

## 2023-06-12 DIAGNOSIS — E55.9 VITAMIN D DEFICIENCY: ICD-10-CM

## 2023-06-12 DIAGNOSIS — Q61.3 POLYCYSTIC KIDNEY DISEASE: ICD-10-CM

## 2023-06-12 DIAGNOSIS — N18.31 STAGE 3A CHRONIC KIDNEY DISEASE: Primary | ICD-10-CM

## 2023-07-24 ENCOUNTER — HOSPITAL ENCOUNTER (OUTPATIENT)
Dept: RADIOLOGY | Facility: HOSPITAL | Age: 33
Discharge: HOME | End: 2023-07-24
Attending: INTERNAL MEDICINE
Payer: COMMERCIAL

## 2023-07-24 DIAGNOSIS — N18.31 STAGE 3A CHRONIC KIDNEY DISEASE: ICD-10-CM

## 2023-07-24 DIAGNOSIS — Q61.3 POLYCYSTIC KIDNEY DISEASE: ICD-10-CM

## 2023-07-24 PROCEDURE — 76770 US EXAM ABDO BACK WALL COMP: CPT | Mod: 26,,, | Performed by: RADIOLOGY

## 2023-07-24 PROCEDURE — 76770 US EXAM ABDO BACK WALL COMP: CPT | Mod: TC,PN

## 2023-07-24 PROCEDURE — 76770 US RETROPERITONEAL COMPLETE: ICD-10-PCS | Mod: 26,,, | Performed by: RADIOLOGY

## 2023-08-01 ENCOUNTER — OFFICE VISIT (OUTPATIENT)
Dept: NEPHROLOGY | Facility: CLINIC | Age: 33
End: 2023-08-01
Payer: COMMERCIAL

## 2023-08-01 VITALS — BODY MASS INDEX: 27.47 KG/M2 | WEIGHT: 175 LBS | HEIGHT: 67 IN

## 2023-08-01 DIAGNOSIS — R80.1 PERSISTENT PROTEINURIA: ICD-10-CM

## 2023-08-01 DIAGNOSIS — N25.81 SECONDARY HYPERPARATHYROIDISM OF RENAL ORIGIN: ICD-10-CM

## 2023-08-01 DIAGNOSIS — N18.32 ANEMIA IN STAGE 3B CHRONIC KIDNEY DISEASE: ICD-10-CM

## 2023-08-01 DIAGNOSIS — N18.32 STAGE 3B CHRONIC KIDNEY DISEASE: Primary | ICD-10-CM

## 2023-08-01 DIAGNOSIS — D63.1 ANEMIA IN STAGE 3B CHRONIC KIDNEY DISEASE: ICD-10-CM

## 2023-08-01 DIAGNOSIS — E55.9 VITAMIN D DEFICIENCY: ICD-10-CM

## 2023-08-01 DIAGNOSIS — Q61.3 POLYCYSTIC KIDNEY DISEASE: ICD-10-CM

## 2023-08-01 DIAGNOSIS — E87.20 METABOLIC ACIDOSIS: ICD-10-CM

## 2023-08-01 DIAGNOSIS — I12.9 HYPERTENSION, RENAL: ICD-10-CM

## 2023-08-01 PROCEDURE — 1160F RVW MEDS BY RX/DR IN RCRD: CPT | Mod: CPTII,95,, | Performed by: INTERNAL MEDICINE

## 2023-08-01 PROCEDURE — 99215 PR OFFICE/OUTPT VISIT, EST, LEVL V, 40-54 MIN: ICD-10-PCS | Mod: 95,,, | Performed by: INTERNAL MEDICINE

## 2023-08-01 PROCEDURE — 4010F ACE/ARB THERAPY RXD/TAKEN: CPT | Mod: CPTII,95,, | Performed by: INTERNAL MEDICINE

## 2023-08-01 PROCEDURE — 3044F PR MOST RECENT HEMOGLOBIN A1C LEVEL <7.0%: ICD-10-PCS | Mod: CPTII,95,, | Performed by: INTERNAL MEDICINE

## 2023-08-01 PROCEDURE — 99215 OFFICE O/P EST HI 40 MIN: CPT | Mod: 95,,, | Performed by: INTERNAL MEDICINE

## 2023-08-01 PROCEDURE — 1160F PR REVIEW ALL MEDS BY PRESCRIBER/CLIN PHARMACIST DOCUMENTED: ICD-10-PCS | Mod: CPTII,95,, | Performed by: INTERNAL MEDICINE

## 2023-08-01 PROCEDURE — 3044F HG A1C LEVEL LT 7.0%: CPT | Mod: CPTII,95,, | Performed by: INTERNAL MEDICINE

## 2023-08-01 PROCEDURE — 1159F PR MEDICATION LIST DOCUMENTED IN MEDICAL RECORD: ICD-10-PCS | Mod: CPTII,95,, | Performed by: INTERNAL MEDICINE

## 2023-08-01 PROCEDURE — 3066F NEPHROPATHY DOC TX: CPT | Mod: CPTII,95,, | Performed by: INTERNAL MEDICINE

## 2023-08-01 PROCEDURE — 1159F MED LIST DOCD IN RCRD: CPT | Mod: CPTII,95,, | Performed by: INTERNAL MEDICINE

## 2023-08-01 PROCEDURE — 3008F PR BODY MASS INDEX (BMI) DOCUMENTED: ICD-10-PCS | Mod: CPTII,95,, | Performed by: INTERNAL MEDICINE

## 2023-08-01 PROCEDURE — 3066F PR DOCUMENTATION OF TREATMENT FOR NEPHROPATHY: ICD-10-PCS | Mod: CPTII,95,, | Performed by: INTERNAL MEDICINE

## 2023-08-01 PROCEDURE — 3008F BODY MASS INDEX DOCD: CPT | Mod: CPTII,95,, | Performed by: INTERNAL MEDICINE

## 2023-08-01 PROCEDURE — 4010F PR ACE/ARB THEARPY RXD/TAKEN: ICD-10-PCS | Mod: CPTII,95,, | Performed by: INTERNAL MEDICINE

## 2023-08-01 NOTE — PROGRESS NOTES
Subjective:       Patient ID: Nathalie Roland is a 32 y.o. White female who presents for follow-up evaluation of Chronic Kidney Disease and Polycystic Kidney Disease    Previously followed by Dr. King      2019:  Patient denies any complaints today. She is currently not interested in staring tolvaptan since she is considering another child.   2019:  Patient is feeling fine, no complaints.  She has not made a decision about tolvaptan since she is considering another child.   May 23, 2020:  Patient is feeling fine, Creatinine at 1.4 today. Patient is requesting repeat MRA since her aunt  from cerebral aneurysm.   2020:  Creatinine stable at 1.3.      Interval history May 2021: New Pt to Dr. Feng   The patient location is: Home  The chief complaint leading to consultation is: PKD, CKD  Visit type: audiovisual  Face to Face time with patient: 32 min  61 minutes of total time spent on the encounter, which includes face to face time and non-face to face time preparing to see the patient (eg, review of tests), Obtaining and/or reviewing separately obtained history, Documenting clinical information in the electronic or other health record, Independently interpreting results (not separately reported) and communicating results to the patient/family/caregiver, or Care coordination (not separately reported).   Each patient to whom he or she provides medical services by telemedicine is:  (1) informed of the relationship between the physician and patient and the respective role of any other health care provider with respect to management of the patient; and (2) notified that he or she may decline to receive medical services by telemedicine and may withdraw from such care at any time.  Notes: She is doing well. Last labs are from  HISTORY AUG 2021  The patient location is: Workplace  The chief complaint leading to consultation is: CKD and PKD  Visit  type: audiovisual  54 minutes of total time spent on the encounter, which includes face to face time and non-face to face time preparing to see the patient (eg, review of tests), Obtaining and/or reviewing separately obtained history, Documenting clinical information in the electronic or other health record, Independently interpreting results (not separately reported) and communicating results to the patient/family/caregiver, or Care coordination (not separately reported).   Each patient to whom he or she provides medical services by telemedicine is:  (1) informed of the relationship between the physician and patient and the respective role of any other health care provider with respect to management of the patient; and (2) notified that he or she may decline to receive medical services by telemedicine and may withdraw from such care at any time.  Notes: She sent her information for [facility] . Overall she feels well. No LE edema. BP is fine. No flank pain. No new HAs/change in HAs. Mom and GF are pre-DM. Her mother is s/p kidney txp! About 4 weeks out     Interval History July 2022:  The patient location is: Work  The chief complaint leading to consultation is: PKD  Visit type: audiovisual  52 minutes of total time spent on the encounter, which includes face to face time and non-face to face time preparing to see the patient (eg, review of tests), Obtaining and/or reviewing separately obtained history, Documenting clinical information in the electronic or other health record, Independently interpreting results (not separately reported) and communicating results to the patient/family/caregiver, or Care coordination (not separately reported).   Each patient to whom he or she provides medical services by telemedicine is:  (1) informed of the relationship between the physician and patient and the respective role of any other health care provider with respect to management of the patient; and (2) notified that he or  she may decline to receive medical services by telemedicine and may withdraw from such care at any time.  Notes: She and her family in January had COVID, no hospital stay. Increasing ace helped BPs. Overall feeling well. Her mother is one year out from (second) tx     Jan 2023:  The patient location is: [place]  The chief complaint leading to consultation is: PKD, CKD  Visit type: audiovisual  49 minutes of total time spent on the encounter, which includes face to face time and non-face to face time preparing to see the patient (eg, review of tests), Obtaining and/or reviewing separately obtained history, Documenting clinical information in the electronic or other health record, Independently interpreting results (not separately reported) and communicating results to the patient/family/caregiver, or Care coordination (not separately reported).   Each patient to whom he or she provides medical services by telemedicine is:  (1) informed of the relationship between the physician and patient and the respective role of any other health care provider with respect to management of the patient; and (2) notified that he or she may decline to receive medical services by telemedicine and may withdraw from such care at any time.  Notes:  She is feeling well. Now working in [facility], closer to home. Possibility of another child but not actively planning     August 2023:  The patient location is: [place]  The chief complaint leading to consultation is: PKD  Visit type: audiovisual  55 minutes of total time spent on the encounter, which includes face to face time and non-face to face time preparing to see the patient (eg, review of tests), Obtaining and/or reviewing separately obtained history, Documenting clinical information in the electronic or other health record, Independently interpreting results (not separately reported) and communicating results to the patient/family/caregiver, or Care coordination (not separately reported).    Each patient to whom he or she provides medical services by telemedicine is:  (1) informed of the relationship between the physician and patient and the respective role of any other health care provider with respect to management of the patient; and (2) notified that he or she may decline to receive medical services by telemedicine and may withdraw from such care at any time.  Notes: She is doing well, feeling well. 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 BPs stable           Family History   Problem Relation Age of Onset    Polycystic kidney disease Mother     Hypertension Mother     Cancer Mother         thyroid cancer    Polycystic kidney disease Maternal Uncle     Hypertension Maternal Uncle     Polycystic kidney disease Maternal Grandfather     Hypertension Maternal Grandfather     Cancer Paternal Grandmother         breast cancer    Hypertension Paternal Grandmother     Heart failure Paternal Grandmother     Breast cancer Paternal Grandmother     Heart attack Paternal Grandfather     Hypertension Paternal Grandfather     No Known Problems Father     Polycystic kidney disease Maternal Aunt     Bipolar disorder Paternal Aunt     Colon cancer Neg Hx     Uterine cancer Neg Hx     Ovarian cancer Neg Hx          Review of Systems   Constitutional:  Negative for activity change, appetite change, fatigue and unexpected weight change.   HENT:  Negative for facial swelling.    Respiratory:  Negative for shortness of breath.    Cardiovascular:  Negative for chest pain and leg swelling.   Gastrointestinal:  Negative for abdominal pain.   Genitourinary:  Negative for hematuria.   Psychiatric/Behavioral:  Negative for confusion and decreased concentration.        Objective:      Physical Exam  Vitals and nursing note reviewed.   Constitutional:       General: She is not in acute distress.     Appearance: She is  not ill-appearing.   HENT:      Head: Atraumatic.   Eyes:      General: No scleral icterus.  Pulmonary:      Effort: No respiratory distress.   Neurological:      Mental Status: She is alert and oriented to person, place, and time. Mental status is at baseline.   Psychiatric:         Mood and Affect: Mood normal.         Behavior: Behavior normal.         Thought Content: Thought content normal.         Judgment: Judgment normal.       Assessment:       1. Stage 3b chronic kidney disease    2. Polycystic kidney disease    3. Secondary hyperparathyroidism of renal origin    4. Metabolic acidosis    5. Vitamin D deficiency    6. Hypertension, renal    7. Persistent proteinuria    8. Anemia in stage 3b chronic kidney disease            Plan:           ASSESSMENT AND PLAN:  30 y.o. year old female with history of PCKD, HTN, migraines, proteinuria presents to the renal clinic for evaluation of renal insufficiency.    PKD  - progression of kidney disease, as expected   - exhibits intermittent proteinuria  - Continue RAAS blockade   - due for MRA in 2025 (Last MRA June 2020, family member with aneurysm)   - renal u/s due Summer 2024     HTN  - controlled  - monitor    Mineral and Bone Disease  - continue D3, level is in target, monitor to not overshoot  - Secondary Hyperparathyroidism, trend PTH  - cotninue exogenous bicarbonate for metabolic acidosis      Mild Anemia  - monitor H&H  - check Tsat    Screen for DM 2 negative, 5.3, Jan 2023      RTC 6mo or prn sooner

## 2023-08-02 PROBLEM — N18.32 STAGE 3B CHRONIC KIDNEY DISEASE: Status: ACTIVE | Noted: 2021-05-23

## 2023-08-02 PROBLEM — N18.9 ANEMIA IN CHRONIC KIDNEY DISEASE (CKD): Status: ACTIVE | Noted: 2023-08-02

## 2023-08-02 PROBLEM — D63.1 ANEMIA IN CHRONIC KIDNEY DISEASE (CKD): Status: ACTIVE | Noted: 2023-08-02

## 2023-08-18 RX ORDER — IRON,CARBONYL/ASCORBIC ACID 100-250 MG
TABLET ORAL
Qty: 30 TABLET | Refills: 6 | Status: SHIPPED | OUTPATIENT
Start: 2023-08-18 | End: 2024-03-20

## 2023-08-25 DIAGNOSIS — R00.2 PALPITATIONS: Primary | ICD-10-CM

## 2023-09-15 RX ORDER — CARVEDILOL 25 MG/1
25 TABLET ORAL 2 TIMES DAILY WITH MEALS
Qty: 180 TABLET | Refills: 2 | Status: SHIPPED | OUTPATIENT
Start: 2023-09-15 | End: 2024-09-14

## 2023-10-25 ENCOUNTER — PATIENT MESSAGE (OUTPATIENT)
Dept: NEPHROLOGY | Facility: CLINIC | Age: 33
End: 2023-10-25
Payer: COMMERCIAL

## 2023-10-30 RX ORDER — HYDROCHLOROTHIAZIDE 12.5 MG/1
12.5 TABLET ORAL DAILY
Qty: 90 TABLET | Refills: 1 | Status: SHIPPED | OUTPATIENT
Start: 2023-10-30 | End: 2024-10-29

## 2023-10-31 ENCOUNTER — CLINICAL SUPPORT (OUTPATIENT)
Dept: AUDIOLOGY | Facility: CLINIC | Age: 33
End: 2023-10-31
Payer: COMMERCIAL

## 2023-10-31 ENCOUNTER — OFFICE VISIT (OUTPATIENT)
Dept: OTOLARYNGOLOGY | Facility: CLINIC | Age: 33
End: 2023-10-31
Payer: COMMERCIAL

## 2023-10-31 VITALS — WEIGHT: 175.06 LBS | BODY MASS INDEX: 27.48 KG/M2 | HEIGHT: 67 IN

## 2023-10-31 DIAGNOSIS — H61.21 RIGHT EAR IMPACTED CERUMEN: ICD-10-CM

## 2023-10-31 DIAGNOSIS — H93.13 SUBJECTIVE TINNITUS OF BOTH EARS: Primary | ICD-10-CM

## 2023-10-31 DIAGNOSIS — H93.A1 PULSATILE TINNITUS OF RIGHT EAR: Primary | ICD-10-CM

## 2023-10-31 PROCEDURE — 69210 REMOVE IMPACTED EAR WAX UNI: CPT | Mod: S$GLB,,, | Performed by: OTOLARYNGOLOGY

## 2023-10-31 PROCEDURE — 1159F MED LIST DOCD IN RCRD: CPT | Mod: CPTII,S$GLB,, | Performed by: OTOLARYNGOLOGY

## 2023-10-31 PROCEDURE — 3044F PR MOST RECENT HEMOGLOBIN A1C LEVEL <7.0%: ICD-10-PCS | Mod: CPTII,S$GLB,, | Performed by: OTOLARYNGOLOGY

## 2023-10-31 PROCEDURE — 4010F ACE/ARB THERAPY RXD/TAKEN: CPT | Mod: CPTII,S$GLB,, | Performed by: OTOLARYNGOLOGY

## 2023-10-31 PROCEDURE — 1159F PR MEDICATION LIST DOCUMENTED IN MEDICAL RECORD: ICD-10-PCS | Mod: CPTII,S$GLB,, | Performed by: OTOLARYNGOLOGY

## 2023-10-31 PROCEDURE — 3066F NEPHROPATHY DOC TX: CPT | Mod: CPTII,S$GLB,, | Performed by: OTOLARYNGOLOGY

## 2023-10-31 PROCEDURE — 99999 PR PBB SHADOW E&M-EST. PATIENT-LVL I: CPT | Mod: PBBFAC,,,

## 2023-10-31 PROCEDURE — 99999 PR PBB SHADOW E&M-EST. PATIENT-LVL I: ICD-10-PCS | Mod: PBBFAC,,,

## 2023-10-31 PROCEDURE — 92552 PURE TONE AUDIOMETRY AIR: CPT | Mod: S$GLB,,,

## 2023-10-31 PROCEDURE — 99999 PR PBB SHADOW E&M-EST. PATIENT-LVL III: ICD-10-PCS | Mod: PBBFAC,,, | Performed by: OTOLARYNGOLOGY

## 2023-10-31 PROCEDURE — 3008F BODY MASS INDEX DOCD: CPT | Mod: CPTII,S$GLB,, | Performed by: OTOLARYNGOLOGY

## 2023-10-31 PROCEDURE — 99999 PR PBB SHADOW E&M-EST. PATIENT-LVL III: CPT | Mod: PBBFAC,,, | Performed by: OTOLARYNGOLOGY

## 2023-10-31 PROCEDURE — 3066F PR DOCUMENTATION OF TREATMENT FOR NEPHROPATHY: ICD-10-PCS | Mod: CPTII,S$GLB,, | Performed by: OTOLARYNGOLOGY

## 2023-10-31 PROCEDURE — 99214 OFFICE O/P EST MOD 30 MIN: CPT | Mod: 25,S$GLB,, | Performed by: OTOLARYNGOLOGY

## 2023-10-31 PROCEDURE — 92552 PR PURE TONE AUDIOMETRY, AIR: ICD-10-PCS | Mod: S$GLB,,,

## 2023-10-31 PROCEDURE — 69210 PR REMOVAL IMPACTED CERUMEN REQUIRING INSTRUMENTATION, UNILATERAL: ICD-10-PCS | Mod: S$GLB,,, | Performed by: OTOLARYNGOLOGY

## 2023-10-31 PROCEDURE — 3008F PR BODY MASS INDEX (BMI) DOCUMENTED: ICD-10-PCS | Mod: CPTII,S$GLB,, | Performed by: OTOLARYNGOLOGY

## 2023-10-31 PROCEDURE — 99214 PR OFFICE/OUTPT VISIT, EST, LEVL IV, 30-39 MIN: ICD-10-PCS | Mod: 25,S$GLB,, | Performed by: OTOLARYNGOLOGY

## 2023-10-31 PROCEDURE — 3044F HG A1C LEVEL LT 7.0%: CPT | Mod: CPTII,S$GLB,, | Performed by: OTOLARYNGOLOGY

## 2023-10-31 PROCEDURE — 4010F PR ACE/ARB THEARPY RXD/TAKEN: ICD-10-PCS | Mod: CPTII,S$GLB,, | Performed by: OTOLARYNGOLOGY

## 2023-10-31 NOTE — PROGRESS NOTES
"Referring Provider:    No referring provider defined for this encounter.  Subjective:   Patient: Nathalie Roland [id number], :1990   Visit date:10/31/2023 9:15 AM    Chief Complaint:  Tinnitus (Pt has pulsing and ringing in the right ear x 1 year )    HPI:    Prior notes reviewed by myself.  Clinical documentation obtained by nursing staff reviewed.     33-year-old female with medical history significant for polycystic kidney disease and hypertension presents for evaluation of muffled hearing and intermittent pulsatile tinnitus.  She has a history of prior cerumen impaction as well as intermittent pulsatile tinnitus, predominantly on the right side.  She does have hypertension which has been difficult to control and her primary care physician recently added another medication to assist with this.  She has not noticed any decreases in her hearing or dizziness.  No significant prior otologic history.      Objective:     Physical Exam:  Vitals:  Ht 5' 7" (1.702 m)   Wt 79.4 kg (175 lb 0.7 oz)   BMI 27.42 kg/m²   General appearance:  Well developed, well nourished    Ears:  Otoscopy of external auditory canals with90% occlusive cerumen impaction right side and tympanic membranes was normal, clinical speech reception thresholds grossly intact, no mass/lesion of auricle.    Nose:  No masses/lesions of external nose, nasal mucosa, septum, and turbinates were within normal limits.    Mouth:  No mass/lesion of lips, teeth, gums, hard/soft palate, tongue, tonsils, or oropharynx.    Neck & Lymphatics:  No cervical lymphadenopathy, no neck mass/crepitus/ asymmetry, trachea is midline, no thyroid enlargement/tenderness/mass.    Procedure Note    CHIEF COMPLAINT:  Cerumen Impaction    Description:  The patient was seated in an exam chair.  An ear speculum was placed in the right EAC and was examined under the microscope.  Suction and/or loop curettes were used to remove a large cerumen impaction.  The tympanic " membrane was visualized and was normal in appearance.  The patient tolerated the procedure well.        [x]  Data Reviewed:    Lab Results   Component Value Date    WBC 7.84 07/24/2023    HGB 11.4 (L) 07/24/2023    HCT 35.4 (L) 07/24/2023    MCV 92 07/24/2023    EOSINOPHIL 3.6 07/24/2023         [x]  Independent interpretation of test: Normal hearing     Media Information    File Link    Annotation on 10/31/2023  8:58 AM by Jamaica Oilver Au.D, CCC-A: AUDIOGRAM        Key Information    Document ID File Type Document Type Description   [id number] Annotation Annotation AUDIOGRAM     Import Information    Attached At Date Time User Dept   Encounter Level 10/31/2023  8:58 AM Jamaica Oliver Au.D, CCC-A [facility] Audiology     Encounter    Clinical Support on 10/31/23 with Jamaica Oliver Au.D, CCC-A             Assessment & Plan:   Pulsatile tinnitus of right ear    Right ear impacted cerumen      We successfully debrided her right ear today.  Her tympanic membranes are normal in appearance.  She has intermittent pulsatile tinnitus and hypertension that has been difficult to control.  She recently modified her medication regimen for her hypertension.  We discussed the possible contribution of stress and hypertension to the intermittent pulsatile tinnitus.  We agreed that she would monitor her symptoms on this new medication regimen for at least 2-3 weeks and then contact me via MyChart if her pulsatile tinnitus has not improved.    We reviewed her audiogram together in detail.  We also discussed that tinnitus is most often caused by a hearing loss, and that as the hair cells are damaged, either genetic or as a result of loud noise exposure, they then cause tinnitus.  Some patients find that restricting the salt or caffeine in their diet helps, and there is also an OTC supplement, lipoflavonoids, that some people find to be effective though their benefit is not fully proven. Arches is another supplement that  some patients have seen improvement with.  Tinnitus tends to be louder in times of stress and fatigue, and may decrease with time.  Sound machines may also be an effective masking technique if needed at night.

## 2023-10-31 NOTE — PROGRESS NOTES
Nathalie Roland was seen 10/31/2023 for an audiological evaluation. Previous audiological evaluation on 3/25/2022 revealed normal hearing sensitivity in both ears. Patient complains of tinnitus in both ears but mostly in the right ear. She reported tinnitus is sometimes pulsatile. Patient denied recent noise exposure and difficulties hearing.     Otoscopy revealed non-occluding cerumen with partial visualization of the tympanic membrane in both ears. Audiometry revealed normal hearing sensitivity in both ears.    Patient was counseled on the above findings. We discussed that tinnitus is most often caused by a hearing loss or repeated noise exposure. As the hair cells are damaged, either genetic or as a result of loud noise exposure, they then cause tinnitus- which is believe to be our brains generating sounds that we are no longer hearing. Unfortunately there is no proven cure for tinnitus, however there are different management options. People with hearing loss and tinnitus can find a reduction in their tinnitus with use of hearing aids. We discussed use of external maskers (e.g., sound machines) and personal tinnitus maskers. Research has also shown benefits from cognitive behavioral therapy or tinnitus retraining therapy. Some patients find that restricting the salt or caffeine in their diet helps, and there is also an OTC supplement, lipflavinoids, that some people find to be effective though their benefit is not fully proven. Tinnitus tends to be louder in times of stress and fatigue, and patients may benefit from meditation and yoga.     Recommendations:  Follow-up with ENT, as scheduled.  Repeat audiological evaluation as needed.

## 2023-12-12 RX ORDER — SODIUM BICARBONATE 650 MG/1
TABLET ORAL
Qty: 180 TABLET | Refills: 1 | Status: SHIPPED | OUTPATIENT
Start: 2023-12-12

## 2024-01-16 RX ORDER — PRAVASTATIN SODIUM 40 MG/1
40 TABLET ORAL NIGHTLY
Qty: 90 TABLET | Refills: 1 | Status: SHIPPED | OUTPATIENT
Start: 2024-01-16 | End: 2025-01-15

## 2024-02-01 DIAGNOSIS — R00.2 PALPITATIONS: Primary | ICD-10-CM

## 2024-02-01 DIAGNOSIS — Z76.89 ENCOUNTER TO ESTABLISH CARE: ICD-10-CM

## 2024-02-02 NOTE — PROGRESS NOTES
Subjective:   Patient ID:  Nathalie Roland is a 33 y.o. female who presents for cardiac consult of Palpitations    Referring Physician: Raul Santana MD   [id number] [place] [place] [address]    Reason for consult: palpitations.       Follow-up      The patient came in today for cardiac consult of Palpitations      Nathalie Roland is a 33 y.o. female pt with HTN, HLD, PCKD, migraines, anxiety, anemia presents for follow up CV eval.     3/8/21  ECG last month with NSR hr 81, low voltage, nonspec T wave changes. She has been on labetol since pregnancy, she started having palpitations a few weeks ago.   She feels it couple of times a day, unsure of trigger, sometimes after laying down or after eating food. Occ cough. Symptoms may last few min to 10 min. Daughter is 3 years.   She was diagnosed PCKD at age 13. Her mother on transplant list for kidney.     1/30/23  Vital monitor 5/2022 overall neg, rare ectopy, AVG HR 77 bpm. Recent lipids with elevated Tc 246, , . BP and HR well controlled. BMI 27 - 176 lbs.     2/5/24  Lipids improved 5/2023 - Tc 144, LDL 68, .   Bp and hR well controlled. BMI 26 - 168 lbs    She has more palpitations lately. After eating she has more palpitations.     Patient has fairly good exercise tolerance. 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     Patient is compliant with medications.    FH - mother - had renal transplant for PCKD; HTN; grandfather - quad bypass    Vital 5/2022  Conclusion       The patient was monitored for a total of 13d 21h, underlying rhythm is Sinus.     The patient was monitored for a total of 13d 21h, underlying rhythm is Sinus.  The minimum heart rate was 46 bpm; the maximum 149 bpm; the average 77 bpm.  0 % of Atrial fibrillation/Atrial flutter with longest episode of 0 ms.  -- AV block with 0 %  There were 0 pauses, the longest pause was 0 ms at --.  0 episodes of VT were found with Longest VT at 0 s .  3  supraventricular episodes were found. Longest SVT Episode 4 beats  There were a total of 757 PVCs with 2 morphologies and 0 couplets. Overall PVC [place] at 0.05 %  There were a total of 21 PSVCs with 1 morphologies and 4 couplets. Overall PSVC [place] at < 0.01 %  There is a total of 10 patient events.    Results for orders placed during the hospital encounter of 21    Exercise Stress - EKG    Interpretation Summary    The EKG portion of this study is negative for ischemia.    The patient reported no chest pain during the stress test.    There were no arrhythmias during stress.    The exercise capacity was normal.      HOLTER 3/2021  The diary was properly completed, see below  There were rare PVCs totalling 219 and averaging 4.56 per hour.  VT There were 0 runs.  There were very rare PACs totalling 6 and averaging 0.13 per hour.  SVT There were 0 runs.    Results for orders placed during the hospital encounter of 21    Echo Color Flow Doppler? Yes    Interpretation Summary  · The left ventricle is normal in size with concentric remodeling and normal systolic function. The estimated ejection fraction is 55%  · Normal left ventricular diastolic function.  · With normal right ventricular systolic function.  · Normal central venous pressure (3 mmHg).  · The estimated PA systolic pressure is 38 mmHg.      ECG  Normal sinus rhythm  Low voltage QRS  Nonspecific T wave abnormality  Abnormal ECG  When compared with ECG of 23-MAY-2017 15:50,  No significant change was found  Confirmed by MD BERNICE, REJI ([id number]) on 2021 9:55:43 PM    Past Medical History:   Diagnosis Date    Anemia     Anxiety     Hypertension     renal hypertension    Migraine     Polycystic kidney disease        Past Surgical History:   Procedure Laterality Date     SECTION      eye cyst      HERNIA REPAIR      UMBILICAL HERNIA REPAIR         Social History     Tobacco Use    Smoking status: Never    Smokeless tobacco: Never   Substance  Use Topics    Alcohol use: Yes     Alcohol/week: 0.0 standard drinks of alcohol     Comment: socially    Drug use: No       Family History   Problem Relation Age of Onset    Polycystic kidney disease Mother     Hypertension Mother     Cancer Mother         thyroid cancer    Polycystic kidney disease Maternal Uncle     Hypertension Maternal Uncle     Polycystic kidney disease Maternal Grandfather     Hypertension Maternal Grandfather     Cancer Paternal Grandmother         breast cancer    Hypertension Paternal Grandmother     Heart failure Paternal Grandmother     Breast cancer Paternal Grandmother     Heart attack Paternal Grandfather     Hypertension Paternal Grandfather     No Known Problems Father     Polycystic kidney disease Maternal Aunt     Bipolar disorder Paternal Aunt     Colon cancer Neg Hx     Uterine cancer Neg Hx     Ovarian cancer Neg Hx        Patient's Medications   New Prescriptions    PANTOPRAZOLE (PROTONIX) 40 MG TABLET    Take 1 tablet (40 mg total) by mouth once daily.   Previous Medications    CARVEDILOL (COREG) 25 MG TABLET    Take 1 tablet (25 mg total) by mouth 2 (two) times daily with meals.    ENALAPRIL (VASOTEC) 20 MG TABLET    TAKE 1 TABLET BY MOUTH TWICE DAILY    HYDROCHLOROTHIAZIDE (HYDRODIURIL) 12.5 MG TAB    Take 1 tablet (12.5 mg total) by mouth once daily.    IRON-VITAMIN C 100-250 MG, ICAR-C, 100-250 MG TAB    TAKE 1 TABLET BY MOUTH EVERY DAY    METRONIDAZOLE 0.75% (METROCREAM) 0.75 % CREA    AAA bid for rosacea/redness    PRAVASTATIN (PRAVACHOL) 40 MG TABLET    Take 1 tablet (40 mg total) by mouth every evening.    SODIUM BICARBONATE 650 MG TABLET    TAKE 1 TABLET BY MOUTH TWICE DAILY FOR METABOLIC ACIDOSIS   Modified Medications    No medications on file   Discontinued Medications    No medications on file       Review of Systems   Constitutional: Negative.    HENT: Negative.     Eyes: Negative.    Respiratory: Negative.     Cardiovascular:  Positive for palpitations.  "  Gastrointestinal: Negative.    Genitourinary: Negative.    Musculoskeletal: Negative.    Skin: Negative.    Neurological: Negative.    Endo/Heme/Allergies: Negative.    Psychiatric/Behavioral: Negative.     All 12 systems otherwise negative.      Wt Readings from Last 3 Encounters:   02/05/24 76.5 kg (168 lb 10.4 oz)   10/31/23 79.4 kg (175 lb 0.7 oz)   08/01/23 79.4 kg (175 lb)     Temp Readings from Last 3 Encounters:   01/23/23 97.9 °F (36.6 °C) (Oral)   03/25/22 97.7 °F (36.5 °C)   03/24/22 97.9 °F (36.6 °C) (Temporal)     BP Readings from Last 3 Encounters:   02/05/24 124/80   01/30/23 124/80   01/23/23 122/80     Pulse Readings from Last 3 Encounters:   02/05/24 88   01/30/23 67   01/23/23 77       /80 (BP Location: Left arm, Patient Position: Sitting, BP Method: Medium (Manual))   Pulse 88   Ht 5' 7" (1.702 m)   Wt 76.5 kg (168 lb 10.4 oz)   SpO2 98%   BMI 26.41 kg/m²     Objective:   Physical Exam  Vitals and nursing note reviewed.   Constitutional:       General: She is not in acute distress.     Appearance: She is well-developed. She is not diaphoretic.   HENT:      Head: Normocephalic and atraumatic.      Nose: Nose normal.   Eyes:      General: No scleral icterus.     Conjunctiva/sclera: Conjunctivae normal.   Neck:      Thyroid: No thyromegaly.      Vascular: No JVD.   Cardiovascular:      Rate and Rhythm: Normal rate and regular rhythm.      Heart sounds: S1 normal and S2 normal. No murmur heard.     No friction rub. No gallop. No S3 or S4 sounds.   Pulmonary:      Effort: Pulmonary effort is normal. No respiratory distress.      Breath sounds: Normal breath sounds. No stridor. No wheezing or rales.   Chest:      Chest wall: No tenderness.   Abdominal:      General: Bowel sounds are normal. There is no distension.      Palpations: Abdomen is soft. There is no mass.      Tenderness: There is no abdominal tenderness. There is no rebound.   Genitourinary:     Comments: " Deferred  Musculoskeletal:         General: No tenderness or deformity. Normal range of motion.      Cervical back: Normal range of motion and neck supple.   Lymphadenopathy:      Cervical: No cervical adenopathy.   Skin:     General: Skin is warm and dry.      Coloration: Skin is not pale.      Findings: No erythema or rash.   Neurological:      Mental Status: She is alert and oriented to person, place, and time.      Motor: No abnormal muscle tone.      Coordination: Coordination normal.   Psychiatric:         Behavior: Behavior normal.         Thought Content: Thought content normal.         Judgment: Judgment normal.         Lab Results   Component Value Date     07/24/2023    K 4.9 07/24/2023     07/24/2023    CO2 21 (L) 07/24/2023    BUN 22 (H) 07/24/2023    CREATININE 2.0 (H) 07/24/2023    GLU 94 07/24/2023    HGBA1C 5.3 01/23/2023    MG 2.1 08/27/2018    AST 13 05/01/2023    ALT 12 05/01/2023    ALBUMIN 4.0 07/24/2023    PROT 6.4 05/01/2023    BILITOT 0.6 05/01/2023    WBC 7.84 07/24/2023    HGB 11.4 (L) 07/24/2023    HCT 35.4 (L) 07/24/2023    MCV 92 07/24/2023     07/24/2023    TSH 1.590 01/23/2023    CHOL 144 05/01/2023    HDL 41 05/01/2023    LDLCALC 68.2 05/01/2023    TRIG 174 (H) 05/01/2023     Assessment:      1. Palpitations    2. Hypertension, renal    3. Tachycardia    4. Polycystic kidney disease    5. Stage 3a chronic kidney disease    6. Other migraine without status migrainosus, not intractable    7. Anxiety    8. Iron deficiency anemia, unspecified iron deficiency anemia type    9. Mixed hyperlipidemia    10. Gastroesophageal reflux disease, unspecified whether esophagitis present            Plan:     1. Palpitations with tachycardia  - change labetelol to coreg BID  - prior ECHO and Holter - neg  - Vital monitor 5/2022 overall neg, rare ectopy, AVG HR 77 bpm  - ECG stress test neg 11/2021  - repeat Vital ordered     2. HTN - stable  - cont meds and titrate  - increased  Coreg    3. PCKD with CKD  - cont to monitor  - f/u with PCP/Nephro as needed    4. Anxiety, migraines  - cont tx     5. Anemia, Fe def  - cont tx   - order levels today     6. HLD   - cont Pravastatin 40mg   -Lipids improved 5/2023 - Tc 144, LDL 68, .     7. GERD  - seems worse lately  - start PPI , refer to GI     Thank you for allowing me to participate in this patient's care. Please do not hesitate to contact me with any questions or concerns. Consult note has been forwarded to the referral physician.

## 2024-02-05 ENCOUNTER — OFFICE VISIT (OUTPATIENT)
Dept: CARDIOLOGY | Facility: CLINIC | Age: 34
End: 2024-02-05
Payer: COMMERCIAL

## 2024-02-05 ENCOUNTER — HOSPITAL ENCOUNTER (OUTPATIENT)
Dept: CARDIOLOGY | Facility: HOSPITAL | Age: 34
Discharge: HOME | End: 2024-02-05
Attending: INTERNAL MEDICINE
Payer: COMMERCIAL

## 2024-02-05 VITALS
WEIGHT: 168.63 LBS | HEART RATE: 88 BPM | BODY MASS INDEX: 26.47 KG/M2 | HEIGHT: 67 IN | OXYGEN SATURATION: 98 % | DIASTOLIC BLOOD PRESSURE: 80 MMHG | SYSTOLIC BLOOD PRESSURE: 124 MMHG

## 2024-02-05 DIAGNOSIS — Q61.3 POLYCYSTIC KIDNEY DISEASE: ICD-10-CM

## 2024-02-05 DIAGNOSIS — E78.2 MIXED HYPERLIPIDEMIA: ICD-10-CM

## 2024-02-05 DIAGNOSIS — F41.9 ANXIETY: ICD-10-CM

## 2024-02-05 DIAGNOSIS — R00.0 TACHYCARDIA: ICD-10-CM

## 2024-02-05 DIAGNOSIS — R00.2 PALPITATIONS: Primary | ICD-10-CM

## 2024-02-05 DIAGNOSIS — K21.9 GASTROESOPHAGEAL REFLUX DISEASE, UNSPECIFIED WHETHER ESOPHAGITIS PRESENT: ICD-10-CM

## 2024-02-05 DIAGNOSIS — D50.9 IRON DEFICIENCY ANEMIA, UNSPECIFIED IRON DEFICIENCY ANEMIA TYPE: ICD-10-CM

## 2024-02-05 DIAGNOSIS — I12.9 HYPERTENSION, RENAL: ICD-10-CM

## 2024-02-05 DIAGNOSIS — Z76.89 ENCOUNTER TO ESTABLISH CARE: ICD-10-CM

## 2024-02-05 DIAGNOSIS — G43.809 OTHER MIGRAINE WITHOUT STATUS MIGRAINOSUS, NOT INTRACTABLE: ICD-10-CM

## 2024-02-05 DIAGNOSIS — N18.31 STAGE 3A CHRONIC KIDNEY DISEASE: ICD-10-CM

## 2024-02-05 DIAGNOSIS — R00.2 PALPITATIONS: ICD-10-CM

## 2024-02-05 LAB
OHS QRS DURATION: 84 MS
OHS QTC CALCULATION: 418 MS

## 2024-02-05 PROCEDURE — 99214 OFFICE O/P EST MOD 30 MIN: CPT | Mod: S$GLB,,, | Performed by: INTERNAL MEDICINE

## 2024-02-05 PROCEDURE — 3079F DIAST BP 80-89 MM HG: CPT | Mod: CPTII,S$GLB,, | Performed by: INTERNAL MEDICINE

## 2024-02-05 PROCEDURE — 99999 PR PBB SHADOW E&M-EST. PATIENT-LVL IV: CPT | Mod: PBBFAC,,, | Performed by: INTERNAL MEDICINE

## 2024-02-05 PROCEDURE — 93010 ELECTROCARDIOGRAM REPORT: CPT | Mod: ,,, | Performed by: INTERNAL MEDICINE

## 2024-02-05 PROCEDURE — 93005 ELECTROCARDIOGRAM TRACING: CPT | Mod: PO

## 2024-02-05 PROCEDURE — 1159F MED LIST DOCD IN RCRD: CPT | Mod: CPTII,S$GLB,, | Performed by: INTERNAL MEDICINE

## 2024-02-05 PROCEDURE — 3074F SYST BP LT 130 MM HG: CPT | Mod: CPTII,S$GLB,, | Performed by: INTERNAL MEDICINE

## 2024-02-05 PROCEDURE — 1160F RVW MEDS BY RX/DR IN RCRD: CPT | Mod: CPTII,S$GLB,, | Performed by: INTERNAL MEDICINE

## 2024-02-05 PROCEDURE — 3008F BODY MASS INDEX DOCD: CPT | Mod: CPTII,S$GLB,, | Performed by: INTERNAL MEDICINE

## 2024-02-05 RX ORDER — PANTOPRAZOLE SODIUM 40 MG/1
40 TABLET, DELAYED RELEASE ORAL DAILY
Qty: 30 TABLET | Refills: 3 | Status: SHIPPED | OUTPATIENT
Start: 2024-02-05 | End: 2025-02-04

## 2024-02-09 ENCOUNTER — HOSPITAL ENCOUNTER (OUTPATIENT)
Dept: CARDIOLOGY | Facility: HOSPITAL | Age: 34
Discharge: HOME | End: 2024-02-09
Attending: INTERNAL MEDICINE
Payer: COMMERCIAL

## 2024-02-09 DIAGNOSIS — R00.0 TACHYCARDIA: ICD-10-CM

## 2024-02-09 DIAGNOSIS — R00.2 PALPITATIONS: ICD-10-CM

## 2024-02-09 PROCEDURE — 93244 EXT ECG>48HR<7D REV&INTERPJ: CPT | Mod: ,,, | Performed by: INTERNAL MEDICINE

## 2024-02-22 LAB
OHS CV HOLTER SINUS AVERAGE HR: 74
OHS CV HOLTER SINUS MAX HR: 156
OHS CV HOLTER SINUS MIN HR: 50

## 2024-02-23 ENCOUNTER — TELEPHONE (OUTPATIENT)
Dept: CARDIOLOGY | Facility: CLINIC | Age: 34
End: 2024-02-23
Payer: COMMERCIAL

## 2024-02-23 ENCOUNTER — PATIENT MESSAGE (OUTPATIENT)
Dept: INFECTIOUS DISEASES | Facility: CLINIC | Age: 34
End: 2024-02-23
Payer: COMMERCIAL

## 2024-02-23 DIAGNOSIS — Z00.00 ROUTINE ADULT HEALTH MAINTENANCE: Primary | ICD-10-CM

## 2024-02-23 DIAGNOSIS — Z76.89 ENCOUNTER TO ESTABLISH CARE: ICD-10-CM

## 2024-02-23 RX ORDER — VERAPAMIL HYDROCHLORIDE 120 MG/1
120 CAPSULE, EXTENDED RELEASE ORAL NIGHTLY
Qty: 30 CAPSULE | Refills: 3 | Status: SHIPPED | OUTPATIENT
Start: 2024-02-23 | End: 2024-06-19 | Stop reason: SDUPTHER

## 2024-02-23 NOTE — TELEPHONE ENCOUNTER
Called and spoke to pt regarding her monitor results. Results given and understood by pt. Advised pt of her new prescription and rescheduled pt appointment from May to sooner (03/26/24).     ----- Message from Kevon Aragon MD sent at 2/23/2024  3:59 PM CST -----  Please contact the patient and let them know that their results of heart monitor reveal frequent extra beats - PVCs with rare fast rhythms noted too but nothing sustained. Will start verapamil 120mg every evening to decrease the PVCs, follow up with me in 3-4 weeks will repeat ECG/Holter then and discuss further workup. Monitor BP and heart rates.

## 2024-03-11 DIAGNOSIS — I10 HYPERTENSION, UNSPECIFIED TYPE: ICD-10-CM

## 2024-03-11 RX ORDER — ENALAPRIL MALEATE 20 MG/1
TABLET ORAL
Qty: 180 TABLET | Refills: 1 | Status: SHIPPED | OUTPATIENT
Start: 2024-03-11

## 2024-03-16 ENCOUNTER — PATIENT MESSAGE (OUTPATIENT)
Dept: NEPHROLOGY | Facility: CLINIC | Age: 34
End: 2024-03-16
Payer: COMMERCIAL

## 2024-03-20 RX ORDER — IRON,CARBONYL/ASCORBIC ACID 100-250 MG
1 TABLET ORAL DAILY
Qty: 90 TABLET | Refills: 3 | Status: SHIPPED | OUTPATIENT
Start: 2024-03-20 | End: 2024-05-14 | Stop reason: SDUPTHER

## 2024-03-26 ENCOUNTER — OFFICE VISIT (OUTPATIENT)
Dept: CARDIOLOGY | Facility: CLINIC | Age: 34
End: 2024-03-26
Payer: COMMERCIAL

## 2024-03-26 ENCOUNTER — HOSPITAL ENCOUNTER (OUTPATIENT)
Dept: CARDIOLOGY | Facility: HOSPITAL | Age: 34
Discharge: HOME | End: 2024-03-26
Attending: INTERNAL MEDICINE
Payer: COMMERCIAL

## 2024-03-26 VITALS
OXYGEN SATURATION: 99 % | HEIGHT: 67 IN | HEART RATE: 86 BPM | WEIGHT: 165.81 LBS | DIASTOLIC BLOOD PRESSURE: 76 MMHG | BODY MASS INDEX: 26.02 KG/M2 | SYSTOLIC BLOOD PRESSURE: 106 MMHG

## 2024-03-26 DIAGNOSIS — Z00.00 ROUTINE ADULT HEALTH MAINTENANCE: ICD-10-CM

## 2024-03-26 DIAGNOSIS — Z76.89 ENCOUNTER TO ESTABLISH CARE: ICD-10-CM

## 2024-03-26 DIAGNOSIS — F41.9 ANXIETY: ICD-10-CM

## 2024-03-26 DIAGNOSIS — R00.2 PALPITATIONS: ICD-10-CM

## 2024-03-26 DIAGNOSIS — E78.2 MIXED HYPERLIPIDEMIA: ICD-10-CM

## 2024-03-26 DIAGNOSIS — R00.0 TACHYCARDIA: ICD-10-CM

## 2024-03-26 DIAGNOSIS — I49.3 PVC'S (PREMATURE VENTRICULAR CONTRACTIONS): Primary | ICD-10-CM

## 2024-03-26 DIAGNOSIS — I12.9 HYPERTENSION, RENAL: ICD-10-CM

## 2024-03-26 DIAGNOSIS — Q61.3 POLYCYSTIC KIDNEY DISEASE: ICD-10-CM

## 2024-03-26 DIAGNOSIS — N18.31 STAGE 3A CHRONIC KIDNEY DISEASE: ICD-10-CM

## 2024-03-26 DIAGNOSIS — G43.809 OTHER MIGRAINE WITHOUT STATUS MIGRAINOSUS, NOT INTRACTABLE: ICD-10-CM

## 2024-03-26 DIAGNOSIS — K21.9 GASTROESOPHAGEAL REFLUX DISEASE, UNSPECIFIED WHETHER ESOPHAGITIS PRESENT: ICD-10-CM

## 2024-03-26 DIAGNOSIS — D50.9 IRON DEFICIENCY ANEMIA, UNSPECIFIED IRON DEFICIENCY ANEMIA TYPE: ICD-10-CM

## 2024-03-26 LAB
OHS QRS DURATION: 78 MS
OHS QTC CALCULATION: 400 MS

## 2024-03-26 PROCEDURE — G2211 COMPLEX E/M VISIT ADD ON: HCPCS | Mod: S$GLB,,, | Performed by: INTERNAL MEDICINE

## 2024-03-26 PROCEDURE — 93010 ELECTROCARDIOGRAM REPORT: CPT | Mod: ,,, | Performed by: INTERNAL MEDICINE

## 2024-03-26 PROCEDURE — 93005 ELECTROCARDIOGRAM TRACING: CPT | Mod: PO

## 2024-03-26 PROCEDURE — 3078F DIAST BP <80 MM HG: CPT | Mod: CPTII,S$GLB,, | Performed by: INTERNAL MEDICINE

## 2024-03-26 PROCEDURE — 99214 OFFICE O/P EST MOD 30 MIN: CPT | Mod: S$GLB,,, | Performed by: INTERNAL MEDICINE

## 2024-03-26 PROCEDURE — 3074F SYST BP LT 130 MM HG: CPT | Mod: CPTII,S$GLB,, | Performed by: INTERNAL MEDICINE

## 2024-03-26 PROCEDURE — 3008F BODY MASS INDEX DOCD: CPT | Mod: CPTII,S$GLB,, | Performed by: INTERNAL MEDICINE

## 2024-03-26 PROCEDURE — 1160F RVW MEDS BY RX/DR IN RCRD: CPT | Mod: CPTII,S$GLB,, | Performed by: INTERNAL MEDICINE

## 2024-03-26 PROCEDURE — 1159F MED LIST DOCD IN RCRD: CPT | Mod: CPTII,S$GLB,, | Performed by: INTERNAL MEDICINE

## 2024-03-26 PROCEDURE — 99999 PR PBB SHADOW E&M-EST. PATIENT-LVL III: CPT | Mod: PBBFAC,,, | Performed by: INTERNAL MEDICINE

## 2024-03-26 PROCEDURE — 4010F ACE/ARB THERAPY RXD/TAKEN: CPT | Mod: CPTII,S$GLB,, | Performed by: INTERNAL MEDICINE

## 2024-03-26 NOTE — PROGRESS NOTES
Subjective:   Patient ID:  Nathalie Roland is a 33 y.o. female who presents for cardiac consult of No chief complaint on file.    Referring Physician: Raul Santana MD   [id number] [place] [address]    Reason for consult: palpitations.       Follow-up      The patient came in today for cardiac consult of No chief complaint on file.      Nathalie Roland is a 33 y.o. female pt with PVCs, HTN, HLD, PCKD, migraines, anxiety, anemia presents for follow up CV eval.     2/5/24  Lipids improved 5/2023 - Tc 144, LDL 68, .   Bp and hR well controlled. BMI 26 - 168 lbs  She has more palpitations lately. After eating she has more palpitations.     3/26/24  Results of heart monitor reveal frequent extra beats - PVCs with rare fast rhythms noted too but nothing sustained. Will start verapamil 120mg every evening to decrease the PVCs, follow up with me in 3-4 weeks will repeat ECG/Holter then and discuss further workup.     Vital monitor 2/2024 with freq PVCs -Overall PVC [place] at 7.39 %  added verapamil QHS    BP and HR stable. BMI 25 - 165 lbs  Iron levels were low last visit.   She will see PCP soon.   SHe feels much better.   She will be going to [email or web address] this year.     Patient has fairly good exercise tolerance. Works at [employer] for [employer].     Patient is compliant with medications.    FH - mother - had renal transplant for PCKD; HTN; grandfather - quad bypass    Conclusion 2/2024     The patient was monitored for a total of 6d 21h, underlying rhythm is Sinus.   The minimum heart rate was 50 bpm; the maximum 156 bpm; the average 74 bpm.   0 % of Atrial fibrillation/Atrial flutter with longest episode of 0 ms.   The total burden of AV Block present was 0 %   There were 0 pauses,    Total count of Ventricular Tachycardia (VT): 33 episode(s). Longest VT: 3 beats on Day 7 / 05:10:32 pm. Fastest VT: 166 bpm on Day 5 / 07:02:16 am.   0 supraventricular  episodes were found.  There were a total of 43886 PVCs with 2 morphologies and 5011 couplets. Overall PVC [place] at 7.39 %   There were a total of 0 Other Beats.   There were 0 total number of paced beats.   There were a total of 1 PSVCs with 1 morphologies and 0 couplets. Overall PSVC [place] at < 0.01 %   There is a total of 6 patient events with sinus rhythm and PVCs    Vital 5/2022  Conclusion       The patient was monitored for a total of 13d 21h, underlying rhythm is Sinus.     The patient was monitored for a total of 13d 21h, underlying rhythm is Sinus.  The minimum heart rate was 46 bpm; the maximum 149 bpm; the average 77 bpm.  0 % of Atrial fibrillation/Atrial flutter with longest episode of 0 ms.  -- AV block with 0 %  There were 0 pauses, the longest pause was 0 ms at --.  0 episodes of VT were found with Longest VT at 0 s .  3 supraventricular episodes were found. Longest SVT Episode 4 beats  There were a total of 757 PVCs with 2 morphologies and 0 couplets. Overall PVC [place] at 0.05 %  There were a total of 21 PSVCs with 1 morphologies and 4 couplets. Overall PSVC [place] at < 0.01 %  There is a total of 10 patient events.    Results for orders placed during the hospital encounter of 11/11/21    Exercise Stress - EKG    Interpretation Summary    The EKG portion of this study is negative for ischemia.    The patient reported no chest pain during the stress test.    There were no arrhythmias during stress.    The exercise capacity was normal.      Results for orders placed during the hospital encounter of 03/18/21    Echo Color Flow Doppler? Yes    Interpretation Summary  · The left ventricle is normal in size with concentric remodeling and normal systolic function. The estimated ejection fraction is 55%  · Normal left ventricular diastolic function.  · With normal right ventricular systolic function.  · Normal central venous pressure (3 mmHg).  · The estimated PA systolic pressure is 38 mmHg.        Past  Medical History:   Diagnosis Date    Anemia     Anxiety     Hypertension     renal hypertension    Migraine     Polycystic kidney disease        Past Surgical History:   Procedure Laterality Date     SECTION      eye cyst      HERNIA REPAIR      UMBILICAL HERNIA REPAIR         Social History     Tobacco Use    Smoking status: Never    Smokeless tobacco: Never   Substance Use Topics    Alcohol use: Yes     Alcohol/week: 0.0 standard drinks of alcohol     Comment: socially    Drug use: No       Family History   Problem Relation Age of Onset    Polycystic kidney disease Mother     Hypertension Mother     Cancer Mother         thyroid cancer    Polycystic kidney disease Maternal Uncle     Hypertension Maternal Uncle     Polycystic kidney disease Maternal Grandfather     Hypertension Maternal Grandfather     Cancer Paternal Grandmother         breast cancer    Hypertension Paternal Grandmother     Heart failure Paternal Grandmother     Breast cancer Paternal Grandmother     Heart attack Paternal Grandfather     Hypertension Paternal Grandfather     No Known Problems Father     Polycystic kidney disease Maternal Aunt     Bipolar disorder Paternal Aunt     Colon cancer Neg Hx     Uterine cancer Neg Hx     Ovarian cancer Neg Hx        Patient's Medications   New Prescriptions    No medications on file   Previous Medications    CARVEDILOL (COREG) 25 MG TABLET    Take 1 tablet (25 mg total) by mouth 2 (two) times daily with meals.    ENALAPRIL (VASOTEC) 20 MG TABLET    TAKE 1 TABLET BY MOUTH TWICE DAILY    HYDROCHLOROTHIAZIDE (HYDRODIURIL) 12.5 MG TAB    Take 1 tablet (12.5 mg total) by mouth once daily.    IRON-VITAMIN C 100-250 MG, ICAR-C, 100-250 MG TAB    Take 1 tablet by mouth once daily.    PANTOPRAZOLE (PROTONIX) 40 MG TABLET    Take 1 tablet (40 mg total) by mouth once daily.    PRAVASTATIN (PRAVACHOL) 40 MG TABLET    Take 1 tablet (40 mg total) by mouth every evening.    SODIUM BICARBONATE 650 MG TABLET     "TAKE 1 TABLET BY MOUTH TWICE DAILY FOR METABOLIC ACIDOSIS    VERAPAMIL (VERELAN) 120 MG C24P    Take 1 capsule (120 mg total) by mouth every evening.   Modified Medications    No medications on file   Discontinued Medications    METRONIDAZOLE 0.75% (METROCREAM) 0.75 % CREA    AAA bid for rosacea/redness       Review of Systems   Constitutional: Negative.    HENT: Negative.     Eyes: Negative.    Respiratory: Negative.     Cardiovascular:  Positive for palpitations.   Gastrointestinal: Negative.    Genitourinary: Negative.    Musculoskeletal: Negative.    Skin: Negative.    Neurological: Negative.    Endo/Heme/Allergies: Negative.    Psychiatric/Behavioral: Negative.     All 12 systems otherwise negative.      Wt Readings from Last 3 Encounters:   03/26/24 75.2 kg (165 lb 12.6 oz)   02/05/24 76.5 kg (168 lb 10.4 oz)   10/31/23 79.4 kg (175 lb 0.7 oz)     Temp Readings from Last 3 Encounters:   01/23/23 97.9 °F (36.6 °C) (Oral)   03/25/22 97.7 °F (36.5 °C)   03/24/22 97.9 °F (36.6 °C) (Temporal)     BP Readings from Last 3 Encounters:   03/26/24 106/76   02/05/24 124/80   01/30/23 124/80     Pulse Readings from Last 3 Encounters:   03/26/24 86   02/05/24 88   01/30/23 67       /76 (BP Location: Left arm, Patient Position: Sitting, BP Method: Small (Manual))   Pulse 86   Ht 5' 7" (1.702 m)   Wt 75.2 kg (165 lb 12.6 oz)   SpO2 99%   BMI 25.97 kg/m²     Objective:   Physical Exam  Vitals and nursing note reviewed.   Constitutional:       General: She is not in acute distress.     Appearance: She is well-developed. She is not diaphoretic.   HENT:      Head: Normocephalic and atraumatic.      Nose: Nose normal.   Eyes:      General: No scleral icterus.     Conjunctiva/sclera: Conjunctivae normal.   Neck:      Thyroid: No thyromegaly.      Vascular: No JVD.   Cardiovascular:      Rate and Rhythm: Normal rate and regular rhythm.      Heart sounds: S1 normal and S2 normal. No murmur heard.     No friction rub. No " gallop. No S3 or S4 sounds.   Pulmonary:      Effort: Pulmonary effort is normal. No respiratory distress.      Breath sounds: Normal breath sounds. No stridor. No wheezing or rales.   Chest:      Chest wall: No tenderness.   Abdominal:      General: Bowel sounds are normal. There is no distension.      Palpations: Abdomen is soft. There is no mass.      Tenderness: There is no abdominal tenderness. There is no rebound.   Genitourinary:     Comments: Deferred  Musculoskeletal:         General: No tenderness or deformity. Normal range of motion.      Cervical back: Normal range of motion and neck supple.   Lymphadenopathy:      Cervical: No cervical adenopathy.   Skin:     General: Skin is warm and dry.      Coloration: Skin is not pale.      Findings: No erythema or rash.   Neurological:      Mental Status: She is alert and oriented to person, place, and time.      Motor: No abnormal muscle tone.      Coordination: Coordination normal.   Psychiatric:         Behavior: Behavior normal.         Thought Content: Thought content normal.         Judgment: Judgment normal.         Lab Results   Component Value Date     07/24/2023    K 4.9 07/24/2023     07/24/2023    CO2 21 (L) 07/24/2023    BUN 22 (H) 07/24/2023    CREATININE 2.0 (H) 07/24/2023    GLU 94 07/24/2023    HGBA1C 5.3 01/23/2023    MG 2.1 08/27/2018    AST 13 05/01/2023    ALT 12 05/01/2023    ALBUMIN 4.0 07/24/2023    PROT 6.4 05/01/2023    BILITOT 0.6 05/01/2023    WBC 7.84 07/24/2023    HGB 11.4 (L) 07/24/2023    HCT 35.4 (L) 07/24/2023    MCV 92 07/24/2023     07/24/2023    TSH 1.590 01/23/2023    CHOL 144 05/01/2023    HDL 41 05/01/2023    LDLCALC 68.2 05/01/2023    TRIG 174 (H) 05/01/2023     Assessment:      1. PVC's (premature ventricular contractions)    2. Tachycardia    3. Hypertension, renal    4. Polycystic kidney disease    5. Stage 3a chronic kidney disease    6. Iron deficiency anemia, unspecified iron deficiency anemia type     7. Mixed hyperlipidemia    8. Other migraine without status migrainosus, not intractable    9. Gastroesophageal reflux disease, unspecified whether esophagitis present    10. Palpitations    11. Anxiety              Plan:     1. Palpitations with tachycardia, Freq PVCs -   - change labetelol to coreg BID  - prior ECHO and Holter - neg  - Vital monitor 5/2022 overall neg, rare ectopy, AVG HR 77 bpm  - ECG stress test neg 11/2021  - Vital monitor 2/2024 with freq PVCs -Overall PVC [place] at 7.39 %  added verapamil QHS    2. HTN - stable   - cont meds and titrate  - increased Coreg  - discussed to take HCTZ PRN /hold if BP is below 110/70    3. PCKD with CKD  - cont to monitor  - f/u with PCP/Nephro as needed    4. Anxiety, migraines  - cont tx     5. Anemia, Fe def  - cont tx   - low iron last visit     6. HLD   - cont Pravastatin 40mg   -Lipids improved 5/2023 - Tc 144, LDL 68, .     7. GERD  - seems worse lately  - start PPI , refer to GI     Visit today included increased complexity associated with the care of the episodic problem PVCs addressed and managing the longitudinal care of the patient due to the serious and/or complex managed problem(s) .      Thank you for allowing me to participate in this patient's care. Please do not hesitate to contact me with any questions or concerns. Consult note has been forwarded to the referral physician.

## 2024-03-28 ENCOUNTER — TELEPHONE (OUTPATIENT)
Dept: GASTROENTEROLOGY | Facility: CLINIC | Age: 34
End: 2024-03-28
Payer: COMMERCIAL

## 2024-04-01 ENCOUNTER — ON-DEMAND VIRTUAL (OUTPATIENT)
Dept: URGENT CARE | Facility: CLINIC | Age: 34
End: 2024-04-01
Payer: COMMERCIAL

## 2024-04-01 DIAGNOSIS — J06.9 UPPER RESPIRATORY TRACT INFECTION, UNSPECIFIED TYPE: Primary | ICD-10-CM

## 2024-04-01 PROCEDURE — 99203 OFFICE O/P NEW LOW 30 MIN: CPT | Mod: 95,,, | Performed by: NURSE PRACTITIONER

## 2024-04-01 RX ORDER — BENZONATATE 100 MG/1
100 CAPSULE ORAL 3 TIMES DAILY PRN
Qty: 60 CAPSULE | Refills: 0 | Status: SHIPPED | OUTPATIENT
Start: 2024-04-01 | End: 2024-04-21

## 2024-04-01 NOTE — PROGRESS NOTES
Subjective:      Patient ID: Nathalie Roland is a 33 y.o. female.    Vitals:  vitals were not taken for this visit.     Chief Complaint: Sinus Problem, Cough, and Nausea      Visit Type: TELE AUDIOVISUAL    Present with the patient at the time of consultation: TELEMED PRESENT WITH PATIENT: None, at home    Past Medical History:   Diagnosis Date    Anemia     Anxiety     Hypertension     renal hypertension    Migraine     Polycystic kidney disease      Past Surgical History:   Procedure Laterality Date     SECTION      eye cyst      HERNIA REPAIR      UMBILICAL HERNIA REPAIR       Review of patient's allergies indicates:  No Known Allergies  Current Outpatient Medications on File Prior to Visit   Medication Sig Dispense Refill    carvediloL (COREG) 25 MG tablet Take 1 tablet (25 mg total) by mouth 2 (two) times daily with meals. 180 tablet 2    enalapril (VASOTEC) 20 MG tablet TAKE 1 TABLET BY MOUTH TWICE DAILY 180 tablet 1    hydroCHLOROthiazide (HYDRODIURIL) 12.5 MG Tab Take 1 tablet (12.5 mg total) by mouth once daily. 90 tablet 1    iron-vitamin C 100-250 mg, ICAR-C, 100-250 mg Tab Take 1 tablet by mouth once daily. 90 tablet 3    pantoprazole (PROTONIX) 40 MG tablet Take 1 tablet (40 mg total) by mouth once daily. 30 tablet 3    pravastatin (PRAVACHOL) 40 MG tablet Take 1 tablet (40 mg total) by mouth every evening. 90 tablet 1    sodium bicarbonate 650 MG tablet TAKE 1 TABLET BY MOUTH TWICE DAILY FOR METABOLIC ACIDOSIS 180 tablet 1    verapamiL (VERELAN) 120 MG C24P Take 1 capsule (120 mg total) by mouth every evening. 30 capsule 3     No current facility-administered medications on file prior to visit.     Family History   Problem Relation Age of Onset    Polycystic kidney disease Mother     Hypertension Mother     Cancer Mother         thyroid cancer    Polycystic kidney disease Maternal Uncle     Hypertension Maternal Uncle     Polycystic kidney disease Maternal Grandfather      Hypertension Maternal Grandfather     Cancer Paternal Grandmother         breast cancer    Hypertension Paternal Grandmother     Heart failure Paternal Grandmother     Breast cancer Paternal Grandmother     Heart attack Paternal Grandfather     Hypertension Paternal Grandfather     No Known Problems Father     Polycystic kidney disease Maternal Aunt     Bipolar disorder Paternal Aunt     Colon cancer Neg Hx     Uterine cancer Neg Hx     Ovarian cancer Neg Hx        Medications Ordered                [id number] - [place] - [address]  [address]   [address] [place] [address]    Telephone: [phone]   Fax: [phone]   Hours: Not open 24 hours                         E-Prescribed (1 of 1)              benzonatate (TESSALON) 100 MG capsule    Sig: Take 1 capsule (100 mg total) by mouth 3 (three) times daily as needed for Cough. May take 1-2 caps 3 times daily as needed.       Start: 4/1/24     Quantity: 60 capsule Refills: 0                           Ohs Peq Odvv Intake    4/1/2024  7:44 AM CDT - Filed by Patient   What is your current physical address in the event of a medical emergency? [address] [place] [address]   Are you able to take your vital signs? No   Please attach any relevant images or files          URI symptoms for 3 days. Congestion, cough, headache and nausea. No known sick contacts. Not taking anything currently.    Sinus Problem  Associated symptoms include congestion, coughing and headaches. Pertinent negatives include no chills or shortness of breath.   Cough  Associated symptoms include headaches. Pertinent negatives include no chills, fever, shortness of breath or wheezing.   Nausea  Associated symptoms include congestion, coughing, headaches and nausea. Pertinent negatives include no chills or fever.       Constitution: Negative for chills and fever.   HENT:  Positive for congestion.    Respiratory:   Positive for cough. Negative for shortness of breath and wheezing.    Gastrointestinal:  Positive for nausea.   Neurological:  Positive for headaches.        Objective:   The physical exam was conducted virtually.  Physical Exam   Constitutional: She is oriented to person, place, and time. She does not appear ill. No distress.   HENT:   Head: Normocephalic and atraumatic.   Nose: Nose normal.   Eyes: Extraocular movement intact   Pulmonary/Chest: Effort normal.   Abdominal: Normal appearance.   Musculoskeletal: Normal range of motion.         General: Normal range of motion.   Neurological: no focal deficit. She is alert and oriented to person, place, and time.   Psychiatric: Her behavior is normal. Mood normal.   Vitals reviewed.      Assessment:     1. Upper respiratory tract infection, unspecified type        Plan:   Patient encouraged to monitor symptoms closely and instructed to follow-up for new or worsening symptoms. Further, in-person, evaluation may be necessary for continued treatment. Please follow up with your primary care doctor or specialist as needed. Verbally discussed plan. Patient confirms understanding and is in agreement with treatment and plan.     You must understand that you've received a [facility] Care evaluation only and that you may be released before all your medical problems are known or treated. You, the patient, will arrange for follow up care as instructed.      Upper respiratory tract infection, unspecified type    Other orders  -     benzonatate (TESSALON) 100 MG capsule; Take 1 capsule (100 mg total) by mouth 3 (three) times daily as needed for Cough. May take 1-2 caps 3 times daily as needed.  Dispense: 60 capsule; Refill: 0             Patient Instructions   OVER THE COUNTER RECOMMENDATIONS/SUGGESTIONS (IF NO CONTRAINDICATIONS).     ·         Make sure to stay well hydrated.     ·         Use Nasal Saline to mechanically move any post nasal drip from your eustachian tube or from the  back of your throat.     ·         Use warm saltwater gargles to ease your throat pain. Warm saltwater gargles as needed for sore throat-  1/2 tsp salt to 1 cup warm water, gargle as desired. Warm fluids tend to relieve a sore throat.     .         Throat lozenges, Chloraseptic spray or other over the counter treatments are ok to use as well. Use as directed.     ·         Use an antihistamine such as Claritin, Zyrtec or Allegra to dry you out.     ·         Use pseudoephedrine (behind the counter) to decongest. Pseudoephedrine  30 mg up to 240 mg /day. It can raise your blood pressure and give you palpitations.     ·         Use Mucinex (guaifenesin) to break up mucous up to 2400mg/day to loosen any mucous.     ·         The Mucinex DM pill has a cough suppressant that can be sedating. It can be used at night to stop the tickle at the back of your throat.     ·         You can use Mucinex D (it has guaifenesin and a high dose of pseudoephedrine) in the mornings to help decongest.     ·         Use Afrin (oxymetazoline) in each nare for no longer than 3 days, as it is addictive. It can also dry out your mucous membranes and cause elevated blood pressure. This is especially useful if you are flying.     ·         Use Flonase 1-2 sprays/nostril per day. It is a local acting steroid nasal spray, if you develop a bloody nose, stop using the medication immediately.     ·         Sometimes Nyquil at night is beneficial to help you get some rest, however it is sedating, and it does have an antihistamine, and Tylenol.     ·         Honey is a natural cough suppressant that can be used.     ·         Tylenol up to 4,000 mg a day is safe for short periods and can be used for body aches, pain, and fever. However, in high doses and prolonged use it can cause liver irritation.     ·         Ibuprofen is a non-steroidal anti-inflammatory that can be used for body aches, pain, and fever. However, it can also cause stomach  irritation if overused.

## 2024-04-01 NOTE — LETTER
April 1, 2024    Nathalie Roland  [address]  [facility] [address]             Virtual Visit - Urgent Care  Urgent Care  [address] [facility] [address]   April 1, 2024     Patient: Nathalie Roland   YOB: 1990   Date of Visit: 4/1/2024       To Whom it May Concern:    Nathalie Roland was seen virtually on 4/1/2024. She may return to work provided symptoms have improved and she has been fever free for 24 hours without taking fever reducing medications. .    Please excuse her from any classes or work missed.    If you have any questions or concerns, please don't hesitate to call.    Sincerely,         Kayley Cardoso, NP

## 2024-04-11 RX ORDER — HYDROCHLOROTHIAZIDE 12.5 MG/1
12.5 TABLET ORAL
Qty: 90 TABLET | Refills: 1 | Status: SHIPPED | OUTPATIENT
Start: 2024-04-11

## 2024-05-03 ENCOUNTER — PATIENT MESSAGE (OUTPATIENT)
Dept: NEPHROLOGY | Facility: CLINIC | Age: 34
End: 2024-05-03
Payer: COMMERCIAL

## 2024-05-07 ENCOUNTER — LAB VISIT (OUTPATIENT)
Dept: LAB | Facility: HOSPITAL | Age: 34
End: 2024-05-07
Attending: INTERNAL MEDICINE
Payer: COMMERCIAL

## 2024-05-07 DIAGNOSIS — N18.32 ANEMIA IN STAGE 3B CHRONIC KIDNEY DISEASE: ICD-10-CM

## 2024-05-07 DIAGNOSIS — D63.1 ANEMIA IN STAGE 3B CHRONIC KIDNEY DISEASE: ICD-10-CM

## 2024-05-07 DIAGNOSIS — Q61.3 POLYCYSTIC KIDNEY DISEASE: ICD-10-CM

## 2024-05-07 DIAGNOSIS — N18.32 STAGE 3B CHRONIC KIDNEY DISEASE: ICD-10-CM

## 2024-05-07 LAB
ALBUMIN SERPL BCP-MCNC: 3.7 G/DL (ref 3.5–5.2)
ANION GAP SERPL CALC-SCNC: 9 MMOL/L (ref 8–16)
BASOPHILS # BLD AUTO: 0.05 K/UL (ref 0–0.2)
BASOPHILS NFR BLD: 0.7 % (ref 0–1.9)
BUN SERPL-MCNC: 24 MG/DL (ref 6–20)
CALCIUM SERPL-MCNC: 9.1 MG/DL (ref 8.7–10.5)
CHLORIDE SERPL-SCNC: 105 MMOL/L (ref 95–110)
CO2 SERPL-SCNC: 20 MMOL/L (ref 23–29)
CREAT SERPL-MCNC: 2.3 MG/DL (ref 0.5–1.4)
DIFFERENTIAL METHOD BLD: ABNORMAL
EOSINOPHIL # BLD AUTO: 0.4 K/UL (ref 0–0.5)
EOSINOPHIL NFR BLD: 5.2 % (ref 0–8)
ERYTHROCYTE [DISTWIDTH] IN BLOOD BY AUTOMATED COUNT: 13.8 % (ref 11.5–14.5)
EST. GFR  (NO RACE VARIABLE): 27.9 ML/MIN/1.73 M^2
FERRITIN SERPL-MCNC: 88 NG/ML (ref 20–300)
GLUCOSE SERPL-MCNC: 118 MG/DL (ref 70–110)
HCT VFR BLD AUTO: 32.9 % (ref 37–48.5)
HGB BLD-MCNC: 10.4 G/DL (ref 12–16)
IMM GRANULOCYTES # BLD AUTO: 0.01 K/UL (ref 0–0.04)
IMM GRANULOCYTES NFR BLD AUTO: 0.1 % (ref 0–0.5)
IRON SERPL-MCNC: 33 UG/DL (ref 30–160)
LYMPHOCYTES # BLD AUTO: 2.1 K/UL (ref 1–4.8)
LYMPHOCYTES NFR BLD: 26.7 % (ref 18–48)
MCH RBC QN AUTO: 29.8 PG (ref 27–31)
MCHC RBC AUTO-ENTMCNC: 31.6 G/DL (ref 32–36)
MCV RBC AUTO: 94 FL (ref 82–98)
MONOCYTES # BLD AUTO: 0.7 K/UL (ref 0.3–1)
MONOCYTES NFR BLD: 8.7 % (ref 4–15)
NEUTROPHILS # BLD AUTO: 4.5 K/UL (ref 1.8–7.7)
NEUTROPHILS NFR BLD: 58.6 % (ref 38–73)
NRBC BLD-RTO: 0 /100 WBC
PHOSPHATE SERPL-MCNC: 3.5 MG/DL (ref 2.7–4.5)
PLATELET # BLD AUTO: 194 K/UL (ref 150–450)
PMV BLD AUTO: 11.3 FL (ref 9.2–12.9)
POTASSIUM SERPL-SCNC: 4.4 MMOL/L (ref 3.5–5.1)
PTH-INTACT SERPL-MCNC: 160.3 PG/ML (ref 9–77)
RBC # BLD AUTO: 3.49 M/UL (ref 4–5.4)
SATURATED IRON: 11 % (ref 20–50)
SODIUM SERPL-SCNC: 134 MMOL/L (ref 136–145)
TOTAL IRON BINDING CAPACITY: 312 UG/DL (ref 250–450)
TRANSFERRIN SERPL-MCNC: 211 MG/DL (ref 200–375)
WBC # BLD AUTO: 7.68 K/UL (ref 3.9–12.7)

## 2024-05-07 PROCEDURE — 82728 ASSAY OF FERRITIN: CPT | Performed by: INTERNAL MEDICINE

## 2024-05-07 PROCEDURE — 36415 COLL VENOUS BLD VENIPUNCTURE: CPT | Mod: PO | Performed by: INTERNAL MEDICINE

## 2024-05-07 PROCEDURE — 83540 ASSAY OF IRON: CPT | Performed by: INTERNAL MEDICINE

## 2024-05-07 PROCEDURE — 80069 RENAL FUNCTION PANEL: CPT | Performed by: INTERNAL MEDICINE

## 2024-05-07 PROCEDURE — 85025 COMPLETE CBC W/AUTO DIFF WBC: CPT | Performed by: INTERNAL MEDICINE

## 2024-05-07 PROCEDURE — 83970 ASSAY OF PARATHORMONE: CPT | Performed by: INTERNAL MEDICINE

## 2024-05-14 ENCOUNTER — OFFICE VISIT (OUTPATIENT)
Dept: NEPHROLOGY | Facility: CLINIC | Age: 34
End: 2024-05-14
Payer: COMMERCIAL

## 2024-05-14 VITALS — WEIGHT: 176 LBS | BODY MASS INDEX: 27.62 KG/M2 | HEIGHT: 67 IN

## 2024-05-14 DIAGNOSIS — N18.32 ANEMIA IN STAGE 3B CHRONIC KIDNEY DISEASE: ICD-10-CM

## 2024-05-14 DIAGNOSIS — N25.81 SECONDARY HYPERPARATHYROIDISM OF RENAL ORIGIN: ICD-10-CM

## 2024-05-14 DIAGNOSIS — I12.9 HYPERTENSION, RENAL: ICD-10-CM

## 2024-05-14 DIAGNOSIS — E87.20 METABOLIC ACIDOSIS: ICD-10-CM

## 2024-05-14 DIAGNOSIS — D63.1 ANEMIA IN STAGE 3B CHRONIC KIDNEY DISEASE: ICD-10-CM

## 2024-05-14 DIAGNOSIS — R80.1 PERSISTENT PROTEINURIA: ICD-10-CM

## 2024-05-14 DIAGNOSIS — E55.9 VITAMIN D DEFICIENCY: ICD-10-CM

## 2024-05-14 DIAGNOSIS — N18.4 CHRONIC KIDNEY DISEASE (CKD), STAGE IV (SEVERE): Primary | ICD-10-CM

## 2024-05-14 DIAGNOSIS — D50.8 OTHER IRON DEFICIENCY ANEMIA: ICD-10-CM

## 2024-05-14 DIAGNOSIS — Q61.3 POLYCYSTIC KIDNEY DISEASE: ICD-10-CM

## 2024-05-14 PROCEDURE — 4010F ACE/ARB THERAPY RXD/TAKEN: CPT | Mod: CPTII,95,, | Performed by: INTERNAL MEDICINE

## 2024-05-14 PROCEDURE — 1160F RVW MEDS BY RX/DR IN RCRD: CPT | Mod: CPTII,95,, | Performed by: INTERNAL MEDICINE

## 2024-05-14 PROCEDURE — G2211 COMPLEX E/M VISIT ADD ON: HCPCS | Mod: 95,,, | Performed by: INTERNAL MEDICINE

## 2024-05-14 PROCEDURE — 3066F NEPHROPATHY DOC TX: CPT | Mod: CPTII,95,, | Performed by: INTERNAL MEDICINE

## 2024-05-14 PROCEDURE — 1159F MED LIST DOCD IN RCRD: CPT | Mod: CPTII,95,, | Performed by: INTERNAL MEDICINE

## 2024-05-14 PROCEDURE — 99215 OFFICE O/P EST HI 40 MIN: CPT | Mod: 95,,, | Performed by: INTERNAL MEDICINE

## 2024-05-14 PROCEDURE — 3008F BODY MASS INDEX DOCD: CPT | Mod: CPTII,95,, | Performed by: INTERNAL MEDICINE

## 2024-05-14 PROCEDURE — 3044F HG A1C LEVEL LT 7.0%: CPT | Mod: CPTII,95,, | Performed by: INTERNAL MEDICINE

## 2024-05-14 RX ORDER — IRON,CARBONYL/ASCORBIC ACID 100-250 MG
1 TABLET ORAL 2 TIMES DAILY
Qty: 180 TABLET | Refills: 3 | Status: SHIPPED | OUTPATIENT
Start: 2024-05-14

## 2024-05-14 NOTE — PROGRESS NOTES
Subjective:       Patient ID: Nathalie Roland is a 33 y.o. White female who presents for follow-up evaluation of Chronic Kidney Disease and Polycystic Kidney Disease    Previously followed by Dr. King      2019:  Patient denies any complaints today. She is currently not interested in staring tolvaptan since she is considering another child.   2019:  Patient is feeling fine, no complaints.  She has not made a decision about tolvaptan since she is considering another child.   May 23, 2020:  Patient is feeling fine, Creatinine at 1.4 today. Patient is requesting repeat MRA since her aunt  from cerebral aneurysm.   2020:  Creatinine stable at 1.3.      Interval history May 2021: New Pt to Dr. Feng   The patient location is: Home  The chief complaint leading to consultation is: PKD, CKD  Visit type: audiovisual  Face to Face time with patient: 32 min  61 minutes of total time spent on the encounter, which includes face to face time and non-face to face time preparing to see the patient (eg, review of tests), Obtaining and/or reviewing separately obtained history, Documenting clinical information in the electronic or other health record, Independently interpreting results (not separately reported) and communicating results to the patient/family/caregiver, or Care coordination (not separately reported).   Each patient to whom he or she provides medical services by telemedicine is:  (1) informed of the relationship between the physician and patient and the respective role of any other health care provider with respect to management of the patient; and (2) notified that he or she may decline to receive medical services by telemedicine and may withdraw from such care at any time.  Notes: She is doing well. Last labs are from  HISTORY AUG 2021  The patient location is: Workplace  The chief complaint leading to consultation is: CKD and PKD  Visit  type: audiovisual  54 minutes of total time spent on the encounter, which includes face to face time and non-face to face time preparing to see the patient (eg, review of tests), Obtaining and/or reviewing separately obtained history, Documenting clinical information in the electronic or other health record, Independently interpreting results (not separately reported) and communicating results to the patient/family/caregiver, or Care coordination (not separately reported).   Each patient to whom he or she provides medical services by telemedicine is:  (1) informed of the relationship between the physician and patient and the respective role of any other health care provider with respect to management of the patient; and (2) notified that he or she may decline to receive medical services by telemedicine and may withdraw from such care at any time.  Notes: She sent her information for [facility] . Overall she feels well. No LE edema. BP is fine. No flank pain. No new HAs/change in HAs. Mom and GF are pre-DM. Her mother is s/p kidney txp! About 4 weeks out     Interval History July 2022:  The patient location is: Work  The chief complaint leading to consultation is: PKD  Visit type: audiovisual  52 minutes of total time spent on the encounter, which includes face to face time and non-face to face time preparing to see the patient (eg, review of tests), Obtaining and/or reviewing separately obtained history, Documenting clinical information in the electronic or other health record, Independently interpreting results (not separately reported) and communicating results to the patient/family/caregiver, or Care coordination (not separately reported).   Each patient to whom he or she provides medical services by telemedicine is:  (1) informed of the relationship between the physician and patient and the respective role of any other health care provider with respect to management of the patient; and (2) notified that he or  she may decline to receive medical services by telemedicine and may withdraw from such care at any time.  Notes: She and her family in January had COVID, no hospital stay. Increasing ace helped BPs. Overall feeling well. Her mother is one year out from (second) tx     Jan 2023:  The patient location is: [place]  The chief complaint leading to consultation is: PKD, CKD  Visit type: audiovisual  49 minutes of total time spent on the encounter, which includes face to face time and non-face to face time preparing to see the patient (eg, review of tests), Obtaining and/or reviewing separately obtained history, Documenting clinical information in the electronic or other health record, Independently interpreting results (not separately reported) and communicating results to the patient/family/caregiver, or Care coordination (not separately reported).   Each patient to whom he or she provides medical services by telemedicine is:  (1) informed of the relationship between the physician and patient and the respective role of any other health care provider with respect to management of the patient; and (2) notified that he or she may decline to receive medical services by telemedicine and may withdraw from such care at any time.  Notes:  She is feeling well. Now working in [facility], closer to home. Possibility of another child but not actively planning     August 2023:  The patient location is: [place]  The chief complaint leading to consultation is: PKD  Visit type: audiovisual  55 minutes of total time spent on the encounter, which includes face to face time and non-face to face time preparing to see the patient (eg, review of tests), Obtaining and/or reviewing separately obtained history, Documenting clinical information in the electronic or other health record, Independently interpreting results (not separately reported) and communicating results to the patient/family/caregiver, or Care coordination (not separately reported).    Each patient to whom he or she provides medical services by telemedicine is:  (1) informed of the relationship between the physician and patient and the respective role of any other health care provider with respect to management of the patient; and (2) notified that he or she may decline to receive medical services by telemedicine and may withdraw from such care at any time.  Notes: She is doing well, feeling well. 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 BPs stable     May 2024:  The patient location is: [place]  The chief complaint leading to consultation is: PKD  Visit type: audiovisual  45 minutes of total time spent on the encounter, which includes face to face time and non-face to face time preparing to see the patient (eg, review of tests), Obtaining and/or reviewing separately obtained history, Documenting clinical information in the electronic or other health record, Independently interpreting results (not separately reported) and communicating results to the patient/family/caregiver, or Care coordination (not separately reported).   Each patient to whom he or she provides medical services by telemedicine is:  (1) informed of the relationship between the physician and patient and the respective role of any other health care provider with respect to management of the patient; and (2) notified that he or she may decline to receive medical services by telemedicine and may withdraw from such care at any time.  Notes: She is doing well. No LE edema. BPs better--110-120's/80's. Recent Bronchitis. Palpitations (PPI prescribed but not really taking)             Family History   Problem Relation Name Age of Onset    Polycystic kidney disease Mother      Hypertension Mother      Cancer Mother          thyroid cancer    Polycystic kidney disease Maternal Uncle      Hypertension Maternal Uncle      Polycystic  kidney disease Maternal Grandfather      Hypertension Maternal Grandfather      Cancer Paternal Grandmother          breast cancer    Hypertension Paternal Grandmother      Heart failure Paternal Grandmother      Breast cancer Paternal Grandmother      Heart attack Paternal Grandfather      Hypertension Paternal Grandfather      No Known Problems Father      Polycystic kidney disease Maternal Aunt      Bipolar disorder Paternal Aunt      Colon cancer Neg Hx      Uterine cancer Neg Hx      Ovarian cancer Neg Hx           Review of Systems   Constitutional:  Negative for activity change, appetite change, fatigue and unexpected weight change.   HENT:  Negative for facial swelling.    Respiratory:  Negative for shortness of breath.    Cardiovascular:  Negative for chest pain and leg swelling.   Gastrointestinal:  Negative for abdominal pain.   Genitourinary:  Negative for hematuria.   Psychiatric/Behavioral:  Negative for confusion and decreased concentration.        Objective:      Physical Exam  Vitals and nursing note reviewed.   Constitutional:       General: She is not in acute distress.     Appearance: She is not ill-appearing.   HENT:      Head: Atraumatic.   Eyes:      General: No scleral icterus.  Pulmonary:      Effort: No respiratory distress.   Neurological:      Mental Status: She is alert and oriented to person, place, and time. Mental status is at baseline.   Psychiatric:         Mood and Affect: Mood normal.         Behavior: Behavior normal.         Thought Content: Thought content normal.         Judgment: Judgment normal.       Assessment:       1. Chronic kidney disease (CKD), stage IV (severe)    2. Polycystic kidney disease    3. Secondary hyperparathyroidism of renal origin    4. Metabolic acidosis    5. Vitamin D deficiency    6. Hypertension, renal    7. Persistent proteinuria    8. Anemia in stage 3b chronic kidney disease    9. Other iron deficiency anemia              Plan:            ASSESSMENT AND PLAN:  30 y.o. year old female with history of PCKD, HTN, migraines, proteinuria presents to the renal clinic for evaluation of renal insufficiency.    PKD  - progression of kidney disease, as expected and technically at stage 4 based on most recent chemistries   - exhibits intermittent proteinuria, most recent 530mg  - Continue RAAS blockade   - due for MRA in 2025 (Last MRA June 2020, family member with aneurysm)   - renal u/s due Summer 2024  - discussed new BP parameters as well as a push for low sodium diet, healthy diet and exercise      HTN  - controlled, with new parameters for PKD   - monitor    Mineral and Bone Disease  - continue D3, level is in target, monitor to not overshoot  - Secondary Hyperparathyroidism, trend PTH  - cotninue exogenous bicarbonate for metabolic acidosis      Mild Anemia  - monitor H&H  - Tsat low, on po iron, no strong indication for IV iron     Screen for DM 2 negative, 5.3, May 2024      RTC 4mo or prn sooner with renal u/s

## 2024-05-30 PROBLEM — N18.32 STAGE 3B CHRONIC KIDNEY DISEASE: Status: RESOLVED | Noted: 2021-05-23 | Resolved: 2024-05-30

## 2024-05-30 PROBLEM — N18.4 CHRONIC KIDNEY DISEASE (CKD), STAGE IV (SEVERE): Status: ACTIVE | Noted: 2024-05-30

## 2024-06-03 RX ORDER — CARVEDILOL 25 MG/1
25 TABLET ORAL 2 TIMES DAILY WITH MEALS
Qty: 180 TABLET | Refills: 2 | Status: SHIPPED | OUTPATIENT
Start: 2024-06-03

## 2024-06-03 RX ORDER — SODIUM BICARBONATE 650 MG/1
TABLET ORAL
Qty: 180 TABLET | Refills: 1 | Status: SHIPPED | OUTPATIENT
Start: 2024-06-03

## 2024-06-19 RX ORDER — VERAPAMIL HYDROCHLORIDE 120 MG/1
120 CAPSULE, EXTENDED RELEASE ORAL NIGHTLY
Qty: 30 CAPSULE | Refills: 3 | Status: SHIPPED | OUTPATIENT
Start: 2024-06-19 | End: 2025-06-19

## 2024-06-24 ENCOUNTER — LAB VISIT (OUTPATIENT)
Dept: LAB | Facility: HOSPITAL | Age: 34
End: 2024-06-24
Attending: INTERNAL MEDICINE
Payer: COMMERCIAL

## 2024-06-24 DIAGNOSIS — E78.2 MIXED HYPERLIPIDEMIA: ICD-10-CM

## 2024-06-24 LAB
CHOLEST SERPL-MCNC: 125 MG/DL (ref 120–199)
CHOLEST/HDLC SERPL: 3.3 {RATIO} (ref 2–5)
HDLC SERPL-MCNC: 38 MG/DL (ref 40–75)
HDLC SERPL: 30.4 % (ref 20–50)
LDLC SERPL CALC-MCNC: 57.8 MG/DL (ref 63–159)
NONHDLC SERPL-MCNC: 87 MG/DL
TRIGL SERPL-MCNC: 146 MG/DL (ref 30–150)

## 2024-06-24 PROCEDURE — 36415 COLL VENOUS BLD VENIPUNCTURE: CPT | Mod: PO | Performed by: INTERNAL MEDICINE

## 2024-06-24 PROCEDURE — 80061 LIPID PANEL: CPT | Performed by: INTERNAL MEDICINE

## 2024-07-08 RX ORDER — PRAVASTATIN SODIUM 40 MG/1
40 TABLET ORAL NIGHTLY
Qty: 90 TABLET | Refills: 1 | Status: SHIPPED | OUTPATIENT
Start: 2024-07-08 | End: 2025-07-08

## 2024-07-12 NOTE — PROGRESS NOTES
Subjective:   Patient ID:  Nathalie Roland is a 33 y.o. female who presents for cardiac consult of No chief complaint on file.    Referring Physician: Raul Santana MD   [id number] [place] [id number]    Reason for consult: palpitations.       Follow-up      The patient came in today for cardiac consult of No chief complaint on file.      Nathalie Roland is a 33 y.o. female pt with PVCs, HTN, HLD, PCKD, migraines, anxiety, anemia presents for follow up CV eval.     2/5/24  Lipids improved 5/2023 - Tc 144, LDL 68, .   Bp and hR well controlled. BMI 26 - 168 lbs  She has more palpitations lately. After eating she has more palpitations.     3/26/24  Results of heart monitor reveal frequent extra beats - PVCs with rare fast rhythms noted too but nothing sustained. Will start verapamil 120mg every evening to decrease the PVCs, follow up with me in 3-4 weeks will repeat ECG/Holter then and discuss further workup.   Vital monitor 2/2024 with freq PVCs -Overall PVC [place] at 7.39 %  added verapamil QHS  BP and HR stable. BMI 25 - 165 lbs  Iron levels were low last visit.   She will see PCP soon.   SHe feels much better.   She will be going to [employer] this year.     7/15/24  Lipids improved 6/2024.   BP and HR stable. BMI 25 - 164 lbs   She has been to [place].     Patient has fairly good exercise tolerance. 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     Patient is compliant with medications.    FH - mother - had renal transplant for PCKD; HTN; grandfather - quad bypass    Conclusion 2/2024     The patient was monitored for a total of 6d 21h, underlying rhythm is Sinus.   The minimum heart rate was 50 bpm; the maximum 156 bpm; the average 74 bpm.   0 % of Atrial fibrillation/Atrial flutter with longest episode of 0 ms.   The total burden of AV Block present was 0 %   There were 0 pauses,    Total count of Ventricular Tachycardia (VT): 33 episode(s). Longest VT:  3 beats on Day 7 / 05:10:32 pm. Fastest VT: 166 bpm on Day 5 / 07:02:16 am.   0 supraventricular episodes were found.  There were a total of 74414 PVCs with 2 morphologies and 5011 couplets. Overall PVC [place] at 7.39 %   There were a total of 0 Other Beats.   There were 0 total number of paced beats.   There were a total of 1 PSVCs with 1 morphologies and 0 couplets. Overall PSVC [place] at < 0.01 %   There is a total of 6 patient events with sinus rhythm and PVCs    Vital 5/2022  Conclusion       The patient was monitored for a total of 13d 21h, underlying rhythm is Sinus.     The patient was monitored for a total of 13d 21h, underlying rhythm is Sinus.  The minimum heart rate was 46 bpm; the maximum 149 bpm; the average 77 bpm.  0 % of Atrial fibrillation/Atrial flutter with longest episode of 0 ms.  -- AV block with 0 %  There were 0 pauses, the longest pause was 0 ms at --.  0 episodes of VT were found with Longest VT at 0 s .  3 supraventricular episodes were found. Longest SVT Episode 4 beats  There were a total of 757 PVCs with 2 morphologies and 0 couplets. Overall PVC [place] at 0.05 %  There were a total of 21 PSVCs with 1 morphologies and 4 couplets. Overall PSVC [place] at < 0.01 %  There is a total of 10 patient events.    Results for orders placed during the hospital encounter of 11/11/21    Exercise Stress - EKG    Interpretation Summary    The EKG portion of this study is negative for ischemia.    The patient reported no chest pain during the stress test.    There were no arrhythmias during stress.    The exercise capacity was normal.      Results for orders placed during the hospital encounter of 03/18/21    Echo Color Flow Doppler? Yes    Interpretation Summary  · The left ventricle is normal in size with concentric remodeling and normal systolic function. The estimated ejection fraction is 55%  · Normal left ventricular diastolic function.  · With normal right ventricular systolic function.  ·  Normal central venous pressure (3 mmHg).  · The estimated PA systolic pressure is 38 mmHg.        Past Medical History:   Diagnosis Date    Anemia     Anxiety     Hypertension     renal hypertension    Migraine     Polycystic kidney disease        Past Surgical History:   Procedure Laterality Date     SECTION      eye cyst      HERNIA REPAIR      UMBILICAL HERNIA REPAIR         Social History     Tobacco Use    Smoking status: Never    Smokeless tobacco: Never   Substance Use Topics    Alcohol use: Yes     Alcohol/week: 0.0 standard drinks of alcohol     Comment: socially    Drug use: No       Family History   Problem Relation Name Age of Onset    Polycystic kidney disease Mother      Hypertension Mother      Cancer Mother          thyroid cancer    Polycystic kidney disease Maternal Uncle      Hypertension Maternal Uncle      Polycystic kidney disease Maternal Grandfather      Hypertension Maternal Grandfather      Cancer Paternal Grandmother          breast cancer    Hypertension Paternal Grandmother      Heart failure Paternal Grandmother      Breast cancer Paternal Grandmother      Heart attack Paternal Grandfather      Hypertension Paternal Grandfather      No Known Problems Father      Polycystic kidney disease Maternal Aunt      Bipolar disorder Paternal Aunt      Colon cancer Neg Hx      Uterine cancer Neg Hx      Ovarian cancer Neg Hx         Patient's Medications   New Prescriptions    No medications on file   Previous Medications    CARVEDILOL (COREG) 25 MG TABLET    TAKE 1 TABLET(25 MG) BY MOUTH TWICE DAILY WITH MEALS    ENALAPRIL (VASOTEC) 20 MG TABLET    TAKE 1 TABLET BY MOUTH TWICE DAILY    HYDROCHLOROTHIAZIDE (HYDRODIURIL) 12.5 MG TAB    TAKE 1 TABLET(12.5 MG) BY MOUTH EVERY DAY    IRON-VITAMIN C 100-250 MG, ICAR-C, 100-250 MG TAB    Take 1 tablet by mouth 2 (two) times a day.    PANTOPRAZOLE (PROTONIX) 40 MG TABLET    Take 1 tablet (40 mg total) by mouth once daily.    PRAVASTATIN  "(PRAVACHOL) 40 MG TABLET    Take 1 tablet (40 mg total) by mouth every evening.    SODIUM BICARBONATE 650 MG TABLET    TAKE 1 TABLET BY MOUTH TWICE DAILY FOR METABOLIC ACIDOSIS    VERAPAMIL (VERELAN) 120 MG C24P    Take 1 capsule (120 mg total) by mouth every evening.   Modified Medications    No medications on file   Discontinued Medications    No medications on file       Review of Systems   Constitutional: Negative.    HENT: Negative.     Eyes: Negative.    Respiratory: Negative.     Cardiovascular:  Positive for palpitations.   Gastrointestinal: Negative.    Genitourinary: Negative.    Musculoskeletal: Negative.    Skin: Negative.    Neurological: Negative.    Endo/Heme/Allergies: Negative.    Psychiatric/Behavioral: Negative.     All 12 systems otherwise negative.      Wt Readings from Last 3 Encounters:   07/15/24 74.8 kg (164 lb 14.5 oz)   05/14/24 79.8 kg (176 lb)   03/26/24 75.2 kg (165 lb 12.6 oz)     Temp Readings from Last 3 Encounters:   01/23/23 97.9 °F (36.6 °C) (Oral)   03/25/22 97.7 °F (36.5 °C)   03/24/22 97.9 °F (36.6 °C) (Temporal)     BP Readings from Last 3 Encounters:   07/15/24 104/74   03/26/24 106/76   02/05/24 124/80     Pulse Readings from Last 3 Encounters:   07/15/24 83   03/26/24 86   02/05/24 88       /74 (BP Location: Right arm, Patient Position: Sitting, BP Method: Small (Manual))   Pulse 83   Ht 5' 7" (1.702 m)   Wt 74.8 kg (164 lb 14.5 oz)   SpO2 100%   BMI 25.83 kg/m²     Objective:   Physical Exam  Vitals and nursing note reviewed.   Constitutional:       General: She is not in acute distress.     Appearance: She is well-developed. She is not diaphoretic.   HENT:      Head: Normocephalic and atraumatic.      Nose: Nose normal.   Eyes:      General: No scleral icterus.     Conjunctiva/sclera: Conjunctivae normal.   Neck:      Thyroid: No thyromegaly.      Vascular: No JVD.   Cardiovascular:      Rate and Rhythm: Normal rate and regular rhythm.      Heart sounds: S1 " normal and S2 normal. No murmur heard.     No friction rub. No gallop. No S3 or S4 sounds.   Pulmonary:      Effort: Pulmonary effort is normal. No respiratory distress.      Breath sounds: Normal breath sounds. No stridor. No wheezing or rales.   Chest:      Chest wall: No tenderness.   Abdominal:      General: Bowel sounds are normal. There is no distension.      Palpations: Abdomen is soft. There is no mass.      Tenderness: There is no abdominal tenderness. There is no rebound.   Genitourinary:     Comments: Deferred  Musculoskeletal:         General: No tenderness or deformity. Normal range of motion.      Cervical back: Normal range of motion and neck supple.   Lymphadenopathy:      Cervical: No cervical adenopathy.   Skin:     General: Skin is warm and dry.      Coloration: Skin is not pale.      Findings: No erythema or rash.   Neurological:      Mental Status: She is alert and oriented to person, place, and time.      Motor: No abnormal muscle tone.      Coordination: Coordination normal.   Psychiatric:         Behavior: Behavior normal.         Thought Content: Thought content normal.         Judgment: Judgment normal.         Lab Results   Component Value Date     (L) 05/07/2024    K 4.4 05/07/2024     05/07/2024    CO2 20 (L) 05/07/2024    BUN 24 (H) 05/07/2024    CREATININE 2.3 (H) 05/07/2024     (H) 05/07/2024    HGBA1C 5.3 05/09/2024    HGBA1C 5.3 01/23/2023    MG 2.1 08/27/2018    AST 13 05/01/2023    ALT 12 05/01/2023    ALBUMIN 3.7 05/07/2024    PROT 6.4 05/01/2023    BILITOT 0.6 05/01/2023    WBC 7.68 05/07/2024    HGB 10.4 (L) 05/07/2024    HCT 32.9 (L) 05/07/2024    MCV 94 05/07/2024     05/07/2024    TSH 2.88 05/09/2024    TSH 1.590 01/23/2023    CHOL 125 06/24/2024    HDL 38 (L) 06/24/2024    LDLCALC 57.8 (L) 06/24/2024    TRIG 146 06/24/2024     Assessment:      1. Tachycardia    2. PVC's (premature ventricular contractions)    3. Polycystic kidney disease    4.  Hypertension, renal    5. Iron deficiency anemia, unspecified iron deficiency anemia type    6. Stage 3a chronic kidney disease    7. Other migraine without status migrainosus, not intractable    8. Mixed hyperlipidemia    9. Palpitations    10. Gastroesophageal reflux disease, unspecified whether esophagitis present    11. Anxiety        Plan:     1. Palpitations with tachycardia, Freq PVCs - improving   - change labetelol to coreg BID  - prior ECHO and Holter - neg  - Vital monitor 5/2022 overall neg, rare ectopy, AVG HR 77 bpm  - ECG stress test neg 11/2021  - Vital monitor 2/2024 with freq PVCs -Overall PVC [place] at 7.39 %  added verapamil QHS    2. HTN - stable   - cont meds and titrate  - increased Coreg  - discussed to take HCTZ PRN /hold if BP is below 110/70    3. PCKD with CKD  - cont to monitor  - f/u with PCP/Nephro as needed    4. Anxiety, migraines  - cont tx     5. Anemia, Fe def  - cont tx - doubled iron  - low iron last visit     6. HLD   - cont Pravastatin 40mg   -Lipids improved  6/2024    7. GERD  - seems worse lately  - started PPI , referred to GI - pending     Visit today included increased complexity associated with the care of the episodic problem PVCs addressed and managing the longitudinal care of the patient due to the serious and/or complex managed problem(s) .      Thank you for allowing me to participate in this patient's care. Please do not hesitate to contact me with any questions or concerns. Consult note has been forwarded to the referral physician.

## 2024-07-15 ENCOUNTER — OFFICE VISIT (OUTPATIENT)
Dept: CARDIOLOGY | Facility: CLINIC | Age: 34
End: 2024-07-15
Payer: COMMERCIAL

## 2024-07-15 VITALS
SYSTOLIC BLOOD PRESSURE: 104 MMHG | OXYGEN SATURATION: 100 % | WEIGHT: 164.88 LBS | HEIGHT: 67 IN | BODY MASS INDEX: 25.88 KG/M2 | HEART RATE: 83 BPM | DIASTOLIC BLOOD PRESSURE: 74 MMHG

## 2024-07-15 DIAGNOSIS — D50.9 IRON DEFICIENCY ANEMIA, UNSPECIFIED IRON DEFICIENCY ANEMIA TYPE: ICD-10-CM

## 2024-07-15 DIAGNOSIS — K21.9 GASTROESOPHAGEAL REFLUX DISEASE, UNSPECIFIED WHETHER ESOPHAGITIS PRESENT: ICD-10-CM

## 2024-07-15 DIAGNOSIS — I49.3 PVC'S (PREMATURE VENTRICULAR CONTRACTIONS): ICD-10-CM

## 2024-07-15 DIAGNOSIS — G43.809 OTHER MIGRAINE WITHOUT STATUS MIGRAINOSUS, NOT INTRACTABLE: ICD-10-CM

## 2024-07-15 DIAGNOSIS — Q61.3 POLYCYSTIC KIDNEY DISEASE: ICD-10-CM

## 2024-07-15 DIAGNOSIS — F41.9 ANXIETY: ICD-10-CM

## 2024-07-15 DIAGNOSIS — E78.2 MIXED HYPERLIPIDEMIA: ICD-10-CM

## 2024-07-15 DIAGNOSIS — R00.2 PALPITATIONS: ICD-10-CM

## 2024-07-15 DIAGNOSIS — I12.9 HYPERTENSION, RENAL: ICD-10-CM

## 2024-07-15 DIAGNOSIS — N18.31 STAGE 3A CHRONIC KIDNEY DISEASE: ICD-10-CM

## 2024-07-15 DIAGNOSIS — R00.0 TACHYCARDIA: Primary | ICD-10-CM

## 2024-07-15 PROCEDURE — 3008F BODY MASS INDEX DOCD: CPT | Mod: CPTII,S$GLB,, | Performed by: INTERNAL MEDICINE

## 2024-07-15 PROCEDURE — 3066F NEPHROPATHY DOC TX: CPT | Mod: CPTII,S$GLB,, | Performed by: INTERNAL MEDICINE

## 2024-07-15 PROCEDURE — 99214 OFFICE O/P EST MOD 30 MIN: CPT | Mod: S$GLB,,, | Performed by: INTERNAL MEDICINE

## 2024-07-15 PROCEDURE — 4010F ACE/ARB THERAPY RXD/TAKEN: CPT | Mod: CPTII,S$GLB,, | Performed by: INTERNAL MEDICINE

## 2024-07-15 PROCEDURE — 3078F DIAST BP <80 MM HG: CPT | Mod: CPTII,S$GLB,, | Performed by: INTERNAL MEDICINE

## 2024-07-15 PROCEDURE — 1160F RVW MEDS BY RX/DR IN RCRD: CPT | Mod: CPTII,S$GLB,, | Performed by: INTERNAL MEDICINE

## 2024-07-15 PROCEDURE — 3074F SYST BP LT 130 MM HG: CPT | Mod: CPTII,S$GLB,, | Performed by: INTERNAL MEDICINE

## 2024-07-15 PROCEDURE — 1159F MED LIST DOCD IN RCRD: CPT | Mod: CPTII,S$GLB,, | Performed by: INTERNAL MEDICINE

## 2024-07-15 PROCEDURE — 99999 PR PBB SHADOW E&M-EST. PATIENT-LVL III: CPT | Mod: PBBFAC,,, | Performed by: INTERNAL MEDICINE

## 2024-07-15 PROCEDURE — 3044F HG A1C LEVEL LT 7.0%: CPT | Mod: CPTII,S$GLB,, | Performed by: INTERNAL MEDICINE

## 2024-07-15 PROCEDURE — G2211 COMPLEX E/M VISIT ADD ON: HCPCS | Mod: S$GLB,,, | Performed by: INTERNAL MEDICINE

## 2024-07-15 RX ORDER — PANTOPRAZOLE SODIUM 40 MG/1
40 TABLET, DELAYED RELEASE ORAL DAILY
Qty: 90 TABLET | Refills: 1 | Status: SHIPPED | OUTPATIENT
Start: 2024-07-15 | End: 2025-07-15

## 2024-07-15 RX ORDER — VERAPAMIL HYDROCHLORIDE 120 MG/1
120 CAPSULE, EXTENDED RELEASE ORAL NIGHTLY
Qty: 90 CAPSULE | Refills: 1 | Status: SHIPPED | OUTPATIENT
Start: 2024-07-15 | End: 2025-07-15

## 2024-07-17 ENCOUNTER — OFFICE VISIT (OUTPATIENT)
Dept: OBSTETRICS AND GYNECOLOGY | Facility: CLINIC | Age: 34
End: 2024-07-17
Payer: COMMERCIAL

## 2024-07-17 VITALS
SYSTOLIC BLOOD PRESSURE: 92 MMHG | BODY MASS INDEX: 26.06 KG/M2 | HEIGHT: 67 IN | DIASTOLIC BLOOD PRESSURE: 60 MMHG | WEIGHT: 166 LBS

## 2024-07-17 DIAGNOSIS — Z01.419 WELL WOMAN EXAM WITH ROUTINE GYNECOLOGICAL EXAM: Primary | ICD-10-CM

## 2024-07-17 DIAGNOSIS — Z12.4 CERVICAL CANCER SCREENING: ICD-10-CM

## 2024-07-17 PROBLEM — Z98.891 STATUS POST CESAREAN SECTION: Status: RESOLVED | Noted: 2017-05-03 | Resolved: 2024-07-17

## 2024-07-17 PROBLEM — O09.93 SUPERVISION OF HIGH RISK PREGNANCY IN THIRD TRIMESTER: Status: RESOLVED | Noted: 2017-05-02 | Resolved: 2024-07-17

## 2024-07-17 PROCEDURE — 87624 HPV HI-RISK TYP POOLED RSLT: CPT | Performed by: OBSTETRICS & GYNECOLOGY

## 2024-07-17 PROCEDURE — 3008F BODY MASS INDEX DOCD: CPT | Mod: CPTII,S$GLB,, | Performed by: OBSTETRICS & GYNECOLOGY

## 2024-07-17 PROCEDURE — 3078F DIAST BP <80 MM HG: CPT | Mod: CPTII,S$GLB,, | Performed by: OBSTETRICS & GYNECOLOGY

## 2024-07-17 PROCEDURE — 99395 PREV VISIT EST AGE 18-39: CPT | Mod: S$GLB,,, | Performed by: OBSTETRICS & GYNECOLOGY

## 2024-07-17 PROCEDURE — 99999 PR PBB SHADOW E&M-EST. PATIENT-LVL III: CPT | Mod: PBBFAC,,, | Performed by: OBSTETRICS & GYNECOLOGY

## 2024-07-17 PROCEDURE — 3066F NEPHROPATHY DOC TX: CPT | Mod: CPTII,S$GLB,, | Performed by: OBSTETRICS & GYNECOLOGY

## 2024-07-17 PROCEDURE — 4010F ACE/ARB THERAPY RXD/TAKEN: CPT | Mod: CPTII,S$GLB,, | Performed by: OBSTETRICS & GYNECOLOGY

## 2024-07-17 PROCEDURE — 3044F HG A1C LEVEL LT 7.0%: CPT | Mod: CPTII,S$GLB,, | Performed by: OBSTETRICS & GYNECOLOGY

## 2024-07-17 PROCEDURE — 1160F RVW MEDS BY RX/DR IN RCRD: CPT | Mod: CPTII,S$GLB,, | Performed by: OBSTETRICS & GYNECOLOGY

## 2024-07-17 PROCEDURE — 1159F MED LIST DOCD IN RCRD: CPT | Mod: CPTII,S$GLB,, | Performed by: OBSTETRICS & GYNECOLOGY

## 2024-07-17 PROCEDURE — 3074F SYST BP LT 130 MM HG: CPT | Mod: CPTII,S$GLB,, | Performed by: OBSTETRICS & GYNECOLOGY

## 2024-07-17 NOTE — PROGRESS NOTES
Subjective     Patient ID: Nathalie Roland is a 33 y.o. female.    Chief Complaint:  Well Woman      History of Present Illness    Presents for well-woman exam.  No gyn complaints.  Pt with regular, monthly periods.  MM with her .  Uses NFP and condoms for contraception.  Declines any other form of birth control, as this has been reliable for them the last 7 years.  Pt with PCKD.  Met with M in 2018 for preconception counseling, and they have decided to not try to conceive again.  They have a healthy, 6 y/o daughter.  Last pap: 21: normal, HPV neg        GYN & OB History  Patient's last menstrual period was 2024.   Date of Last Pap: 2021    OB History    Para Term  AB Living   1 1 1 0 0 1   SAB IAB Ectopic Multiple Live Births   0 0 0 0 1      # Outcome Date GA Lbr [place]/2nd Weight Sex Type Anes PTL Lv   1 Term 17 37w1d  2.58 kg (5 lb 11 oz) F CS-LTranv EPI N ANALIA      Complications: Failure to Progress in First Stage       Review of Systems  Review of Systems   Constitutional:  Negative for chills and fever.   Gastrointestinal:  Negative for abdominal pain, constipation, diarrhea, nausea and vomiting.   Genitourinary:  Positive for menorrhagia. Negative for dysuria, flank pain, frequency, hematuria, pelvic pain, urgency and vaginal discharge.   Musculoskeletal:  Negative for back pain.   Integumentary:  Negative for rash.   Neurological:  Negative for headaches.          Objective   Physical Exam:   Constitutional: She is oriented to person, place, and time. She appears well-developed and well-nourished. No distress.      Neck: No thyromegaly present.     Pulmonary/Chest: Right breast exhibits no inverted nipple, no mass, no nipple discharge, no skin change, no tenderness, no bleeding and no swelling. Left breast exhibits no inverted nipple, no mass, no nipple discharge, no skin change, no tenderness, no bleeding and no swelling. Breasts are symmetrical.         Abdominal: Soft. She exhibits no distension and no mass. There is no abdominal tenderness. There is no rebound and no guarding.     Genitourinary:    Pelvic exam was performed with patient supine.   There is no rash, tenderness, lesion or injury on the right labia. There is no rash, tenderness, lesion or injury on the left labia. Cervix is normal. Right adnexum displays no mass, no tenderness and no fullness. Left adnexum displays no mass, no tenderness and no fullness. No erythema, vaginal discharge, tenderness, bleeding, rectocele or cystocele in the vagina.    No foreign body in the vagina.      No signs of injury in the vagina.   Cervix exhibits no motion tenderness, no discharge and no friability. Uterus is not deviated, not enlarged, not fixed, not tender and not hosting fibroids.               Neurological: She is alert and oriented to person, place, and time.     Psychiatric: She has a normal mood and affect.            Assessment and Plan     1. Well woman exam with routine gynecological exam    2. Cervical cancer screening             Plan:  Nathalie was seen today for well woman.    Diagnoses and all orders for this visit:    Well woman exam with routine gynecological exam    Cervical cancer screening  -     Liquid-Based Pap Smear, Screening  -     HPV High Risk Genotypes, PCR     Reviewed updated recommendations for pap smears (every 3 years) in low risk patients.   Recommend annual pelvic exams.  Reviewed recommendations for annual CBE.  RTC 1 year.

## 2024-07-22 LAB
HPV HR 12 DNA SPEC QL NAA+PROBE: NEGATIVE
HPV16 AG SPEC QL: NEGATIVE
HPV18 DNA SPEC QL NAA+PROBE: NEGATIVE

## 2024-08-15 ENCOUNTER — HOSPITAL ENCOUNTER (OUTPATIENT)
Dept: RADIOLOGY | Facility: HOSPITAL | Age: 34
Discharge: HOME | End: 2024-08-15
Attending: INTERNAL MEDICINE
Payer: COMMERCIAL

## 2024-08-15 DIAGNOSIS — N18.4 CHRONIC KIDNEY DISEASE (CKD), STAGE IV (SEVERE): ICD-10-CM

## 2024-08-15 DIAGNOSIS — Q61.3 POLYCYSTIC KIDNEY DISEASE: ICD-10-CM

## 2024-08-15 PROCEDURE — 76770 US EXAM ABDO BACK WALL COMP: CPT | Mod: TC,PO

## 2024-08-15 PROCEDURE — 76770 US EXAM ABDO BACK WALL COMP: CPT | Mod: 26,,, | Performed by: RADIOLOGY

## 2024-08-20 ENCOUNTER — PATIENT MESSAGE (OUTPATIENT)
Dept: NEPHROLOGY | Facility: CLINIC | Age: 34
End: 2024-08-20

## 2024-08-20 ENCOUNTER — OFFICE VISIT (OUTPATIENT)
Dept: NEPHROLOGY | Facility: CLINIC | Age: 34
End: 2024-08-20
Payer: COMMERCIAL

## 2024-08-20 VITALS — BODY MASS INDEX: 26.06 KG/M2 | WEIGHT: 166 LBS | HEIGHT: 67 IN

## 2024-08-20 DIAGNOSIS — N18.4 ANEMIA IN STAGE 4 CHRONIC KIDNEY DISEASE: ICD-10-CM

## 2024-08-20 DIAGNOSIS — N25.81 SECONDARY HYPERPARATHYROIDISM OF RENAL ORIGIN: ICD-10-CM

## 2024-08-20 DIAGNOSIS — R80.1 PERSISTENT PROTEINURIA: ICD-10-CM

## 2024-08-20 DIAGNOSIS — N28.89 OTHER SPECIFIED DISORDERS OF KIDNEY AND URETER: Primary | ICD-10-CM

## 2024-08-20 DIAGNOSIS — E87.20 METABOLIC ACIDOSIS: ICD-10-CM

## 2024-08-20 DIAGNOSIS — N18.4 CKD (CHRONIC KIDNEY DISEASE) STAGE 4, GFR 15-29 ML/MIN: Primary | ICD-10-CM

## 2024-08-20 DIAGNOSIS — Q61.3 POLYCYSTIC KIDNEY DISEASE: ICD-10-CM

## 2024-08-20 DIAGNOSIS — D50.8 OTHER IRON DEFICIENCY ANEMIA: ICD-10-CM

## 2024-08-20 DIAGNOSIS — E55.9 VITAMIN D DEFICIENCY: ICD-10-CM

## 2024-08-20 DIAGNOSIS — D63.1 ANEMIA IN STAGE 4 CHRONIC KIDNEY DISEASE: ICD-10-CM

## 2024-08-20 DIAGNOSIS — I12.9 HYPERTENSION, RENAL: ICD-10-CM

## 2024-08-20 PROCEDURE — 3044F HG A1C LEVEL LT 7.0%: CPT | Mod: CPTII,95,, | Performed by: INTERNAL MEDICINE

## 2024-08-20 PROCEDURE — 3066F NEPHROPATHY DOC TX: CPT | Mod: CPTII,95,, | Performed by: INTERNAL MEDICINE

## 2024-08-20 PROCEDURE — 99215 OFFICE O/P EST HI 40 MIN: CPT | Mod: 95,,, | Performed by: INTERNAL MEDICINE

## 2024-08-20 PROCEDURE — 4010F ACE/ARB THERAPY RXD/TAKEN: CPT | Mod: CPTII,95,, | Performed by: INTERNAL MEDICINE

## 2024-08-20 PROCEDURE — 99417 PROLNG OP E/M EACH 15 MIN: CPT | Mod: 95,,, | Performed by: INTERNAL MEDICINE

## 2024-08-20 PROCEDURE — 3008F BODY MASS INDEX DOCD: CPT | Mod: CPTII,95,, | Performed by: INTERNAL MEDICINE

## 2024-08-20 PROCEDURE — 1159F MED LIST DOCD IN RCRD: CPT | Mod: CPTII,95,, | Performed by: INTERNAL MEDICINE

## 2024-08-20 PROCEDURE — 1160F RVW MEDS BY RX/DR IN RCRD: CPT | Mod: CPTII,95,, | Performed by: INTERNAL MEDICINE

## 2024-08-20 RX ORDER — FLUOXETINE HYDROCHLORIDE 20 MG/1
20 CAPSULE ORAL
COMMUNITY
Start: 2024-08-12 | End: 2025-02-08

## 2024-08-20 RX ORDER — SODIUM BICARBONATE 650 MG/1
TABLET ORAL
Qty: 270 TABLET | Refills: 1 | Status: SHIPPED | OUTPATIENT
Start: 2024-08-20

## 2024-08-20 RX ORDER — ALPRAZOLAM 0.25 MG/1
0.25 TABLET ORAL 2 TIMES DAILY PRN
COMMUNITY
Start: 2024-08-12

## 2024-08-20 NOTE — PROGRESS NOTES
Subjective:       Patient ID: Nathalie Roland is a 33 y.o. White female who presents for follow-up evaluation of Chronic Kidney Disease and Polycystic Kidney Disease    Previously followed by Dr. King      2019:  Patient denies any complaints today. She is currently not interested in staring tolvaptan since she is considering another child.   2019:  Patient is feeling fine, no complaints.  She has not made a decision about tolvaptan since she is considering another child.   May 23, 2020:  Patient is feeling fine, Creatinine at 1.4 today. Patient is requesting repeat MRA since her aunt  from cerebral aneurysm.   2020:  Creatinine stable at 1.3.      Interval history May 2021: New Pt to Dr. Feng   The patient location is: Home  The chief complaint leading to consultation is: PKD, CKD  Visit type: audiovisual  Face to Face time with patient: 32 min  61 minutes of total time spent on the encounter, which includes face to face time and non-face to face time preparing to see the patient (eg, review of tests), Obtaining and/or reviewing separately obtained history, Documenting clinical information in the electronic or other health record, Independently interpreting results (not separately reported) and communicating results to the patient/family/caregiver, or Care coordination (not separately reported).   Each patient to whom he or she provides medical services by telemedicine is:  (1) informed of the relationship between the physician and patient and the respective role of any other health care provider with respect to management of the patient; and (2) notified that he or she may decline to receive medical services by telemedicine and may withdraw from such care at any time.  Notes: She is doing well. Last labs are from  HISTORY AUG 2021  The patient location is: Workplace  The chief complaint leading to consultation is: CKD and PKD  Visit  type: audiovisual  54 minutes of total time spent on the encounter, which includes face to face time and non-face to face time preparing to see the patient (eg, review of tests), Obtaining and/or reviewing separately obtained history, Documenting clinical information in the electronic or other health record, Independently interpreting results (not separately reported) and communicating results to the patient/family/caregiver, or Care coordination (not separately reported).   Each patient to whom he or she provides medical services by telemedicine is:  (1) informed of the relationship between the physician and patient and the respective role of any other health care provider with respect to management of the patient; and (2) notified that he or she may decline to receive medical services by telemedicine and may withdraw from such care at any time.  Notes: She sent her information for [facility] . Overall she feels well. No LE edema. BP is fine. No flank pain. No new HAs/change in HAs. Mom and GF are pre-DM. Her mother is s/p kidney txp! About 4 weeks out     Interval History July 2022:  The patient location is: Work  The chief complaint leading to consultation is: PKD  Visit type: audiovisual  52 minutes of total time spent on the encounter, which includes face to face time and non-face to face time preparing to see the patient (eg, review of tests), Obtaining and/or reviewing separately obtained history, Documenting clinical information in the electronic or other health record, Independently interpreting results (not separately reported) and communicating results to the patient/family/caregiver, or Care coordination (not separately reported).   Each patient to whom he or she provides medical services by telemedicine is:  (1) informed of the relationship between the physician and patient and the respective role of any other health care provider with respect to management of the patient; and (2) notified that he or  she may decline to receive medical services by telemedicine and may withdraw from such care at any time.  Notes: She and her family in January had COVID, no hospital stay. Increasing ace helped BPs. Overall feeling well. Her mother is one year out from (second) tx     Jan 2023:  The patient location is: [place]  The chief complaint leading to consultation is: PKD, CKD  Visit type: audiovisual  49 minutes of total time spent on the encounter, which includes face to face time and non-face to face time preparing to see the patient (eg, review of tests), Obtaining and/or reviewing separately obtained history, Documenting clinical information in the electronic or other health record, Independently interpreting results (not separately reported) and communicating results to the patient/family/caregiver, or Care coordination (not separately reported).   Each patient to whom he or she provides medical services by telemedicine is:  (1) informed of the relationship between the physician and patient and the respective role of any other health care provider with respect to management of the patient; and (2) notified that he or she may decline to receive medical services by telemedicine and may withdraw from such care at any time.  Notes:  She is feeling well. Now working in [facility], closer to home. Possibility of another child but not actively planning     August 2023:  The patient location is: [place]  The chief complaint leading to consultation is: PKD  Visit type: audiovisual  55 minutes of total time spent on the encounter, which includes face to face time and non-face to face time preparing to see the patient (eg, review of tests), Obtaining and/or reviewing separately obtained history, Documenting clinical information in the electronic or other health record, Independently interpreting results (not separately reported) and communicating results to the patient/family/caregiver, or Care coordination (not separately reported).    Each patient to whom he or she provides medical services by telemedicine is:  (1) informed of the relationship between the physician and patient and the respective role of any other health care provider with respect to management of the patient; and (2) notified that he or she may decline to receive medical services by telemedicine and may withdraw from such care at any time.  Notes: She is doing well, feeling well. 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 BPs stable     May 2024:  The patient location is: [place]  The chief complaint leading to consultation is: PKD  Visit type: audiovisual  45 minutes of total time spent on the encounter, which includes face to face time and non-face to face time preparing to see the patient (eg, review of tests), Obtaining and/or reviewing separately obtained history, Documenting clinical information in the electronic or other health record, Independently interpreting results (not separately reported) and communicating results to the patient/family/caregiver, or Care coordination (not separately reported).   Each patient to whom he or she provides medical services by telemedicine is:  (1) informed of the relationship between the physician and patient and the respective role of any other health care provider with respect to management of the patient; and (2) notified that he or she may decline to receive medical services by telemedicine and may withdraw from such care at any time.  Notes: She is doing well. No LE edema. BPs better--110-120's/80's. Recent Bronchitis. Palpitations (PPI prescribed but not really taking)     August 2024:  The patient location is: [place]  The chief complaint leading to consultation is: PKD  Visit type: audiovisual  70 minutes of total time spent on the encounter, which includes face to face time and non-face to face time preparing to see the patient  (eg, review of tests), Obtaining and/or reviewing separately obtained history, Documenting clinical information in the electronic or other health record, Independently interpreting results (not separately reported) and communicating results to the patient/family/caregiver, or Care coordination (not separately reported).   Each patient to whom he or she provides medical services by telemedicine is:  (1) informed of the relationship between the physician and patient and the respective role of any other health care provider with respect to management of the patient; and (2) notified that he or she may decline to receive medical services by telemedicine and may withdraw from such care at any time.  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 Work is stable. Started bicarb and no problems. Mild constipation with increase of po iron.           Family History   Problem Relation Name Age of Onset    Polycystic kidney disease Mother      Hypertension Mother      Cancer Mother          thyroid cancer    Polycystic kidney disease Maternal Uncle      Hypertension Maternal Uncle      Polycystic kidney disease Maternal Grandfather      Hypertension Maternal Grandfather      Cancer Paternal Grandmother          breast cancer    Hypertension Paternal Grandmother      Heart failure Paternal Grandmother      Breast cancer Paternal Grandmother      Heart attack Paternal Grandfather      Hypertension Paternal Grandfather      No Known Problems Father      Polycystic kidney disease Maternal Aunt      Bipolar disorder Paternal Aunt      Colon cancer Neg Hx      Uterine cancer Neg Hx      Ovarian cancer Neg Hx           Review of Systems   Constitutional:  Negative for activity change, appetite change, fatigue and unexpected weight change.   HENT:  Negative for facial swelling.    Respiratory:  Negative for shortness of breath.    Cardiovascular:  Negative for chest pain and leg swelling.   Gastrointestinal:  Negative for abdominal pain.    Genitourinary:  Negative for hematuria.   Psychiatric/Behavioral:  Negative for confusion and decreased concentration.        Objective:      Physical Exam  Vitals and nursing note reviewed.   Constitutional:       General: She is not in acute distress.     Appearance: She is not ill-appearing.   HENT:      Head: Atraumatic.   Eyes:      General: No scleral icterus.  Pulmonary:      Effort: No respiratory distress.   Neurological:      Mental Status: She is alert and oriented to person, place, and time. Mental status is at baseline.   Psychiatric:         Mood and Affect: Mood normal.         Behavior: Behavior normal.         Thought Content: Thought content normal.         Judgment: Judgment normal.       Assessment:       1. CKD (chronic kidney disease) stage 4, GFR 15-29 ml/min    2. Polycystic kidney disease    3. Secondary hyperparathyroidism of renal origin    4. Metabolic acidosis    5. Vitamin D deficiency    6. Hypertension, renal    7. Persistent proteinuria    8. Anemia in stage 4 chronic kidney disease    9. Other iron deficiency anemia                Plan:           ASSESSMENT AND PLAN:  History of PKD, HTN, migraines, proteinuria presents to the renal clinic for evaluation of renal insufficiency.    PKD  - progression of kidney disease, as expected and technically at stage 4 based on most recent chemistries   - exhibits intermittent proteinuria, most recent lower at 230mg than previous of 530mg  - Continue RAAS blockade   - due for MRA in 2025 (Last MRA June 2020, family member with aneurysm)   - renal u/s  with area of concern>>check MRI without gadolinium versus repeat u/s. Prefer MRI, will discuss with pt    - new BP parameters goals have been met, continue current medications as well as a push for low sodium diet, healthy diet and exercise   - as no family planning is active recommend review of tolvaptan as there are no other simialr treatments available at this time      HTN  - controlled, with  new parameters for PKD   - monitor on current meds    Mineral and Bone Disease  - continue D3, level is in target, monitor to not overshoot  - Secondary Hyperparathyroidism, trend PTH  - cotninue exogenous bicarbonate for metabolic acidosis, but increase to 3 per day      Mild Anemia  - monitor H&H  - Tsat low (17%) , on po iron, no strong indication for IV iron, stable HB, recheck tsta for next visit     Screen for DM 2 negative, 5.3, May 2024      RTC 3mo

## 2024-08-20 NOTE — PATIENT INSTRUCTIONS
NIH:  https://dailymed.nlm.nih.gov/dailymed/drugInfo.cfm?[email or web address]    Medline.gov  https://medlineplus.gov/druginfo/meds/l802804.html    [employer] website

## 2024-08-25 DIAGNOSIS — I10 HYPERTENSION, UNSPECIFIED TYPE: ICD-10-CM

## 2024-08-26 RX ORDER — ENALAPRIL MALEATE 20 MG/1
TABLET ORAL
Qty: 180 TABLET | Refills: 1 | Status: SHIPPED | OUTPATIENT
Start: 2024-08-26

## 2024-08-28 ENCOUNTER — HOSPITAL ENCOUNTER (OUTPATIENT)
Dept: RADIOLOGY | Facility: HOSPITAL | Age: 34
Discharge: HOME | End: 2024-08-28
Attending: INTERNAL MEDICINE
Payer: COMMERCIAL

## 2024-08-28 DIAGNOSIS — N28.89 OTHER SPECIFIED DISORDERS OF KIDNEY AND URETER: ICD-10-CM

## 2024-08-28 PROCEDURE — 74181 MRI ABDOMEN W/O CONTRAST: CPT | Mod: TC,PN

## 2024-08-28 PROCEDURE — 74181 MRI ABDOMEN W/O CONTRAST: CPT | Mod: 26,,, | Performed by: RADIOLOGY

## 2024-09-24 RX ORDER — HYDROCHLOROTHIAZIDE 12.5 MG/1
12.5 TABLET ORAL
Qty: 90 TABLET | Refills: 1 | Status: SHIPPED | OUTPATIENT
Start: 2024-09-24

## 2024-12-20 ENCOUNTER — LAB VISIT (OUTPATIENT)
Dept: LAB | Facility: HOSPITAL | Age: 34
End: 2024-12-20
Attending: INTERNAL MEDICINE
Payer: COMMERCIAL

## 2024-12-20 DIAGNOSIS — N18.4 CKD (CHRONIC KIDNEY DISEASE) STAGE 4, GFR 15-29 ML/MIN: ICD-10-CM

## 2024-12-20 DIAGNOSIS — N25.81 SECONDARY HYPERPARATHYROIDISM OF RENAL ORIGIN: ICD-10-CM

## 2024-12-20 DIAGNOSIS — E55.9 VITAMIN D DEFICIENCY: ICD-10-CM

## 2024-12-20 DIAGNOSIS — Q61.3 POLYCYSTIC KIDNEY DISEASE: ICD-10-CM

## 2024-12-20 DIAGNOSIS — E87.20 METABOLIC ACIDOSIS: ICD-10-CM

## 2024-12-20 LAB
ALBUMIN SERPL BCP-MCNC: 3.7 G/DL (ref 3.5–5.2)
ANION GAP SERPL CALC-SCNC: 9 MMOL/L (ref 8–16)
BASOPHILS # BLD AUTO: 0 K/UL (ref 0–0.2)
BASOPHILS NFR BLD: 0 % (ref 0–1.9)
BUN SERPL-MCNC: 31 MG/DL (ref 6–20)
CALCIUM SERPL-MCNC: 9.2 MG/DL (ref 8.7–10.5)
CHLORIDE SERPL-SCNC: 108 MMOL/L (ref 95–110)
CO2 SERPL-SCNC: 22 MMOL/L (ref 23–29)
CREAT SERPL-MCNC: 2.3 MG/DL (ref 0.5–1.4)
DIFFERENTIAL METHOD BLD: ABNORMAL
EOSINOPHIL # BLD AUTO: 0.3 K/UL (ref 0–0.5)
EOSINOPHIL NFR BLD: 3.7 % (ref 0–8)
ERYTHROCYTE [DISTWIDTH] IN BLOOD BY AUTOMATED COUNT: 11.9 % (ref 11.5–14.5)
EST. GFR  (NO RACE VARIABLE): 27.7 ML/MIN/1.73 M^2
FERRITIN SERPL-MCNC: 75 NG/ML (ref 20–300)
GLUCOSE SERPL-MCNC: 90 MG/DL (ref 70–110)
HCT VFR BLD AUTO: 33.4 % (ref 37–48.5)
HGB BLD-MCNC: 10.5 G/DL (ref 12–16)
IMM GRANULOCYTES # BLD AUTO: 0.01 K/UL (ref 0–0.04)
IMM GRANULOCYTES NFR BLD AUTO: 0.1 % (ref 0–0.5)
IRON SERPL-MCNC: 57 UG/DL (ref 30–160)
LYMPHOCYTES # BLD AUTO: 2.2 K/UL (ref 1–4.8)
LYMPHOCYTES NFR BLD: 31.5 % (ref 18–48)
MAGNESIUM SERPL-MCNC: 2 MG/DL (ref 1.6–2.6)
MCH RBC QN AUTO: 29.7 PG (ref 27–31)
MCHC RBC AUTO-ENTMCNC: 31.4 G/DL (ref 32–36)
MCV RBC AUTO: 95 FL (ref 82–98)
MONOCYTES # BLD AUTO: 0.5 K/UL (ref 0.3–1)
MONOCYTES NFR BLD: 7 % (ref 4–15)
NEUTROPHILS # BLD AUTO: 4 K/UL (ref 1.8–7.7)
NEUTROPHILS NFR BLD: 57.7 % (ref 38–73)
NRBC BLD-RTO: 0 /100 WBC
PHOSPHATE SERPL-MCNC: 4.2 MG/DL (ref 2.7–4.5)
PLATELET # BLD AUTO: 222 K/UL (ref 150–450)
PMV BLD AUTO: 10.8 FL (ref 9.2–12.9)
POTASSIUM SERPL-SCNC: 4.5 MMOL/L (ref 3.5–5.1)
PTH-INTACT SERPL-MCNC: 136 PG/ML (ref 9–77)
RBC # BLD AUTO: 3.53 M/UL (ref 4–5.4)
SATURATED IRON: 19 % (ref 20–50)
SODIUM SERPL-SCNC: 139 MMOL/L (ref 136–145)
TOTAL IRON BINDING CAPACITY: 305 UG/DL (ref 250–450)
TRANSFERRIN SERPL-MCNC: 206 MG/DL (ref 200–375)
WBC # BLD AUTO: 6.98 K/UL (ref 3.9–12.7)

## 2024-12-20 PROCEDURE — 83735 ASSAY OF MAGNESIUM: CPT | Performed by: INTERNAL MEDICINE

## 2024-12-20 PROCEDURE — 82728 ASSAY OF FERRITIN: CPT | Performed by: INTERNAL MEDICINE

## 2024-12-20 PROCEDURE — 80069 RENAL FUNCTION PANEL: CPT | Performed by: INTERNAL MEDICINE

## 2024-12-20 PROCEDURE — 36415 COLL VENOUS BLD VENIPUNCTURE: CPT | Mod: PO | Performed by: INTERNAL MEDICINE

## 2024-12-20 PROCEDURE — 85025 COMPLETE CBC W/AUTO DIFF WBC: CPT | Performed by: INTERNAL MEDICINE

## 2024-12-20 PROCEDURE — 83540 ASSAY OF IRON: CPT | Performed by: INTERNAL MEDICINE

## 2024-12-20 PROCEDURE — 83970 ASSAY OF PARATHORMONE: CPT | Performed by: INTERNAL MEDICINE

## 2025-01-02 ENCOUNTER — OFFICE VISIT (OUTPATIENT)
Dept: NEPHROLOGY | Facility: CLINIC | Age: 35
End: 2025-01-02
Payer: COMMERCIAL

## 2025-01-02 DIAGNOSIS — N18.4 ANEMIA IN STAGE 4 CHRONIC KIDNEY DISEASE: ICD-10-CM

## 2025-01-02 DIAGNOSIS — Q61.3 POLYCYSTIC KIDNEY DISEASE: ICD-10-CM

## 2025-01-02 DIAGNOSIS — D63.1 ANEMIA IN STAGE 4 CHRONIC KIDNEY DISEASE: ICD-10-CM

## 2025-01-02 DIAGNOSIS — N25.81 SECONDARY HYPERPARATHYROIDISM OF RENAL ORIGIN: ICD-10-CM

## 2025-01-02 DIAGNOSIS — D50.8 OTHER IRON DEFICIENCY ANEMIA: ICD-10-CM

## 2025-01-02 DIAGNOSIS — N18.4 CKD (CHRONIC KIDNEY DISEASE) STAGE 4, GFR 15-29 ML/MIN: Primary | ICD-10-CM

## 2025-01-02 DIAGNOSIS — E87.20 METABOLIC ACIDOSIS: ICD-10-CM

## 2025-01-02 DIAGNOSIS — I10 HYPERTENSION, UNSPECIFIED TYPE: ICD-10-CM

## 2025-01-02 DIAGNOSIS — E55.9 VITAMIN D DEFICIENCY: ICD-10-CM

## 2025-01-02 DIAGNOSIS — R80.1 PERSISTENT PROTEINURIA: ICD-10-CM

## 2025-01-02 DIAGNOSIS — I12.9 HYPERTENSION, RENAL: ICD-10-CM

## 2025-01-02 NOTE — PROGRESS NOTES
Subjective:       Patient ID: Nathalie Roland is a 34 y.o. White female who presents for follow-up evaluation of Polycystic Kidney Disease and Chronic Kidney Disease    Previously followed by Dr. King      2019:  Patient denies any complaints today. She is currently not interested in staring tolvaptan since she is considering another child.   2019:  Patient is feeling fine, no complaints.  She has not made a decision about tolvaptan since she is considering another child.   May 23, 2020:  Patient is feeling fine, Creatinine at 1.4 today. Patient is requesting repeat MRA since her aunt  from cerebral aneurysm.   2020:  Creatinine stable at 1.3.      Interval history May 2021: New Pt to Dr. Feng   The patient location is: Home  The chief complaint leading to consultation is: PKD, CKD  Visit type: audiovisual  Face to Face time with patient: 32 min  61 minutes of total time spent on the encounter, which includes face to face time and non-face to face time preparing to see the patient (eg, review of tests), Obtaining and/or reviewing separately obtained history, Documenting clinical information in the electronic or other health record, Independently interpreting results (not separately reported) and communicating results to the patient/family/caregiver, or Care coordination (not separately reported).   Each patient to whom he or she provides medical services by telemedicine is:  (1) informed of the relationship between the physician and patient and the respective role of any other health care provider with respect to management of the patient; and (2) notified that he or she may decline to receive medical services by telemedicine and may withdraw from such care at any time.  Notes: She is doing well. Last labs are from  HISTORY AUG 2021  The patient location is: Workplace  The chief complaint leading to consultation is: CKD and PKD  Visit  type: audiovisual  54 minutes of total time spent on the encounter, which includes face to face time and non-face to face time preparing to see the patient (eg, review of tests), Obtaining and/or reviewing separately obtained history, Documenting clinical information in the electronic or other health record, Independently interpreting results (not separately reported) and communicating results to the patient/family/caregiver, or Care coordination (not separately reported).   Each patient to whom he or she provides medical services by telemedicine is:  (1) informed of the relationship between the physician and patient and the respective role of any other health care provider with respect to management of the patient; and (2) notified that he or she may decline to receive medical services by telemedicine and may withdraw from such care at any time.  Notes: She sent her information for [facility] . Overall she feels well. No LE edema. BP is fine. No flank pain. No new HAs/change in HAs. Mom and GF are pre-DM. Her mother is s/p kidney txp! About 4 weeks out     Interval History July 2022:  The patient location is: Work  The chief complaint leading to consultation is: PKD  Visit type: audiovisual  52 minutes of total time spent on the encounter, which includes face to face time and non-face to face time preparing to see the patient (eg, review of tests), Obtaining and/or reviewing separately obtained history, Documenting clinical information in the electronic or other health record, Independently interpreting results (not separately reported) and communicating results to the patient/family/caregiver, or Care coordination (not separately reported).   Each patient to whom he or she provides medical services by telemedicine is:  (1) informed of the relationship between the physician and patient and the respective role of any other health care provider with respect to management of the patient; and (2) notified that he or  she may decline to receive medical services by telemedicine and may withdraw from such care at any time.  Notes: She and her family in January had COVID, no hospital stay. Increasing ace helped BPs. Overall feeling well. Her mother is one year out from (second) tx     Jan 2023:  The patient location is: [place]  The chief complaint leading to consultation is: PKD, CKD  Visit type: audiovisual  49 minutes of total time spent on the encounter, which includes face to face time and non-face to face time preparing to see the patient (eg, review of tests), Obtaining and/or reviewing separately obtained history, Documenting clinical information in the electronic or other health record, Independently interpreting results (not separately reported) and communicating results to the patient/family/caregiver, or Care coordination (not separately reported).   Each patient to whom he or she provides medical services by telemedicine is:  (1) informed of the relationship between the physician and patient and the respective role of any other health care provider with respect to management of the patient; and (2) notified that he or she may decline to receive medical services by telemedicine and may withdraw from such care at any time.  Notes:  She is feeling well. Now working in [facility], closer to home. Possibility of another child but not actively planning     August 2023:  The patient location is: [place]  The chief complaint leading to consultation is: PKD  Visit type: audiovisual  55 minutes of total time spent on the encounter, which includes face to face time and non-face to face time preparing to see the patient (eg, review of tests), Obtaining and/or reviewing separately obtained history, Documenting clinical information in the electronic or other health record, Independently interpreting results (not separately reported) and communicating results to the patient/family/caregiver, or Care coordination (not separately reported).    Each patient to whom he or she provides medical services by telemedicine is:  (1) informed of the relationship between the physician and patient and the respective role of any other health care provider with respect to management of the patient; and (2) notified that he or she may decline to receive medical services by telemedicine and may withdraw from such care at any time.  Notes: She is doing well, feeling well. 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 BPs stable     May 2024:  The patient location is: [place]  The chief complaint leading to consultation is: PKD  Visit type: audiovisual  45 minutes of total time spent on the encounter, which includes face to face time and non-face to face time preparing to see the patient (eg, review of tests), Obtaining and/or reviewing separately obtained history, Documenting clinical information in the electronic or other health record, Independently interpreting results (not separately reported) and communicating results to the patient/family/caregiver, or Care coordination (not separately reported).   Each patient to whom he or she provides medical services by telemedicine is:  (1) informed of the relationship between the physician and patient and the respective role of any other health care provider with respect to management of the patient; and (2) notified that he or she may decline to receive medical services by telemedicine and may withdraw from such care at any time.  Notes: She is doing well. No LE edema. BPs better--110-120's/80's. Recent Bronchitis. Palpitations (PPI prescribed but not really taking)     August 2024:  The patient location is: [place]  The chief complaint leading to consultation is: PKD  Visit type: audiovisual  70 minutes of total time spent on the encounter, which includes face to face time and non-face to face time preparing to see the patient  (eg, review of tests), Obtaining and/or reviewing separately obtained history, Documenting clinical information in the electronic or other health record, Independently interpreting results (not separately reported) and communicating results to the patient/family/caregiver, or Care coordination (not separately reported).   Each patient to whom he or she provides medical services by telemedicine is:  (1) informed of the relationship between the physician and patient and the respective role of any other health care provider with respect to management of the patient; and (2) notified that he or she may decline to receive medical services by telemedicine and may withdraw from such care at any time.  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 Work is stable. Started bicarb and no problems. Mild constipation with increase of po iron.     Jan 2025:  The patient location is: [place]  The chief complaint leading to consultation is: PKD  Visit type: audiovisual  55 minutes of total time spent on the encounter, which includes face to face time and non-face to face time preparing to see the patient (eg, review of tests), Obtaining and/or reviewing separately obtained history, Documenting clinical information in the electronic or other health record, Independently interpreting results (not separately reported) and communicating results to the patient/family/caregiver, or Care coordination (not separately reported).   Each patient to whom he or she provides medical services by telemedicine is:  (1) informed of the relationship between the physician and patient and the respective role of any other health care provider with respect to management of the patient; and (2) notified that he or she may decline to receive medical services by telemedicine and may withdraw from such care at any time.  Notes: Migraines since 7 y/o, s/p Neuro evaluation in past. Doing well in general. No LE edema, no flank pain. Job situation precarious as her  ortho group will join [facility] clinic soon.         Family History   Problem Relation Name Age of Onset    Polycystic kidney disease Mother      Hypertension Mother      Cancer Mother          thyroid cancer    Polycystic kidney disease Maternal Uncle      Hypertension Maternal Uncle      Polycystic kidney disease Maternal Grandfather      Hypertension Maternal Grandfather      Cancer Paternal Grandmother          breast cancer    Hypertension Paternal Grandmother      Heart failure Paternal Grandmother      Breast cancer Paternal Grandmother      Heart attack Paternal Grandfather      Hypertension Paternal Grandfather      No Known Problems Father      Polycystic kidney disease Maternal Aunt      Bipolar disorder Paternal Aunt      Colon cancer Neg Hx      Uterine cancer Neg Hx      Ovarian cancer Neg Hx           Review of Systems   Constitutional:  Negative for activity change, appetite change, fatigue and unexpected weight change.   HENT:  Negative for facial swelling.    Respiratory:  Negative for shortness of breath.    Cardiovascular:  Negative for chest pain and leg swelling.   Gastrointestinal:  Negative for abdominal pain.   Genitourinary:  Negative for hematuria.   Psychiatric/Behavioral:  Negative for confusion and decreased concentration.        Objective:      Physical Exam  Vitals and nursing note reviewed.   Constitutional:       General: She is not in acute distress.     Appearance: She is not ill-appearing.   HENT:      Head: Atraumatic.   Eyes:      General: No scleral icterus.  Pulmonary:      Effort: No respiratory distress.   Neurological:      Mental Status: She is alert and oriented to person, place, and time. Mental status is at baseline.   Psychiatric:         Mood and Affect: Mood normal.         Behavior: Behavior normal.         Thought Content: Thought content normal.         Judgment: Judgment normal.         Assessment:       1. CKD (chronic kidney disease) stage 4, GFR 15-29 ml/min    2.  Polycystic kidney disease    3. Hypertension, unspecified type    4. Secondary hyperparathyroidism of renal origin    5. Metabolic acidosis    6. Vitamin D deficiency    7. Hypertension, renal    8. Persistent proteinuria    9. Anemia in stage 4 chronic kidney disease    10. Other iron deficiency anemia            Plan:           ASSESSMENT AND PLAN:  History of PKD, HTN, migraines, proteinuria presents to the renal clinic for evaluation of renal insufficiency.    PKD  - progression of kidney disease, as expected currently Stage 4  - exhibits intermittent proteinuria, most recent lower at 230mg than previous of 530mg  - Continue RAAS blockade   - due for MRA in 2025   - renal u/s  with area of concern>>check MRI reviewed, no area of concern   -BP parameters goals are met, continue current medications as well as a push for low sodium diet, healthy diet and exercise   - she decline tolvaptan, but discussed some trials underway      HTN  - controlled, with BP parameters for PKD   - monitor on current meds    Mineral and Bone Disease  - continue D3, level is in target, monitor to not overshoot  - Secondary Hyperparathyroidism, trend PTH  - cotninue exogenous bicarbonate for metabolic acidosis,  3 per day      Mild Anemia  - monitor H&H  - Tsat low improved on po iron, no strong indication for IV iron, stable H, monitor Tsat    Screen for DM 2 negative      RTC 4mo   Visit today included increased complexity associated with the care of the episodic problem addressed and/or managing the longitudinal care of the patient due to the serious and/or complex managed problem(s) as per above diagnosis list.

## 2025-01-08 RX ORDER — PRAVASTATIN SODIUM 40 MG/1
40 TABLET ORAL NIGHTLY
Qty: 90 TABLET | Refills: 1 | Status: SHIPPED | OUTPATIENT
Start: 2025-01-08 | End: 2026-01-08

## 2025-02-23 DIAGNOSIS — I10 HYPERTENSION, UNSPECIFIED TYPE: ICD-10-CM

## 2025-02-24 RX ORDER — ENALAPRIL MALEATE 20 MG/1
20 TABLET ORAL 2 TIMES DAILY
Qty: 180 TABLET | Refills: 1 | Status: SHIPPED | OUTPATIENT
Start: 2025-02-24

## 2025-02-24 RX ORDER — CARVEDILOL 25 MG/1
25 TABLET ORAL 2 TIMES DAILY WITH MEALS
Qty: 180 TABLET | Refills: 2 | Status: SHIPPED | OUTPATIENT
Start: 2025-02-24

## 2025-02-24 RX ORDER — IRON,CARBONYL/ASCORBIC ACID 100-250 MG
1 TABLET ORAL 2 TIMES DAILY
Qty: 180 TABLET | Refills: 3 | Status: SHIPPED | OUTPATIENT
Start: 2025-02-24

## 2025-03-14 DIAGNOSIS — R00.2 PALPITATIONS: ICD-10-CM

## 2025-03-14 DIAGNOSIS — I12.9 HYPERTENSION, RENAL: Primary | ICD-10-CM

## 2025-03-14 DIAGNOSIS — R00.0 TACHYCARDIA: ICD-10-CM

## 2025-03-17 ENCOUNTER — OFFICE VISIT (OUTPATIENT)
Dept: CARDIOLOGY | Facility: CLINIC | Age: 35
End: 2025-03-17
Payer: COMMERCIAL

## 2025-03-17 ENCOUNTER — HOSPITAL ENCOUNTER (OUTPATIENT)
Dept: CARDIOLOGY | Facility: HOSPITAL | Age: 35
Discharge: HOME | End: 2025-03-17
Attending: INTERNAL MEDICINE
Payer: COMMERCIAL

## 2025-03-17 VITALS
BODY MASS INDEX: 28 KG/M2 | DIASTOLIC BLOOD PRESSURE: 77 MMHG | OXYGEN SATURATION: 99 % | HEART RATE: 88 BPM | WEIGHT: 178.81 LBS | SYSTOLIC BLOOD PRESSURE: 133 MMHG

## 2025-03-17 DIAGNOSIS — R00.0 TACHYCARDIA: ICD-10-CM

## 2025-03-17 DIAGNOSIS — I49.3 PVC'S (PREMATURE VENTRICULAR CONTRACTIONS): Primary | ICD-10-CM

## 2025-03-17 DIAGNOSIS — K21.9 GASTROESOPHAGEAL REFLUX DISEASE, UNSPECIFIED WHETHER ESOPHAGITIS PRESENT: ICD-10-CM

## 2025-03-17 DIAGNOSIS — G43.809 OTHER MIGRAINE WITHOUT STATUS MIGRAINOSUS, NOT INTRACTABLE: ICD-10-CM

## 2025-03-17 DIAGNOSIS — Q61.3 POLYCYSTIC KIDNEY DISEASE: ICD-10-CM

## 2025-03-17 DIAGNOSIS — E78.2 MIXED HYPERLIPIDEMIA: ICD-10-CM

## 2025-03-17 DIAGNOSIS — D50.9 IRON DEFICIENCY ANEMIA, UNSPECIFIED IRON DEFICIENCY ANEMIA TYPE: ICD-10-CM

## 2025-03-17 DIAGNOSIS — R00.2 PALPITATIONS: ICD-10-CM

## 2025-03-17 DIAGNOSIS — I12.9 HYPERTENSION, RENAL: ICD-10-CM

## 2025-03-17 DIAGNOSIS — N18.31 STAGE 3A CHRONIC KIDNEY DISEASE: ICD-10-CM

## 2025-03-17 PROCEDURE — 3078F DIAST BP <80 MM HG: CPT | Mod: CPTII,S$GLB,, | Performed by: INTERNAL MEDICINE

## 2025-03-17 PROCEDURE — G2211 COMPLEX E/M VISIT ADD ON: HCPCS | Mod: S$GLB,,, | Performed by: INTERNAL MEDICINE

## 2025-03-17 PROCEDURE — 93005 ELECTROCARDIOGRAM TRACING: CPT | Mod: PO

## 2025-03-17 PROCEDURE — 1159F MED LIST DOCD IN RCRD: CPT | Mod: CPTII,S$GLB,, | Performed by: INTERNAL MEDICINE

## 2025-03-17 PROCEDURE — 99214 OFFICE O/P EST MOD 30 MIN: CPT | Mod: S$GLB,,, | Performed by: INTERNAL MEDICINE

## 2025-03-17 PROCEDURE — 93010 ELECTROCARDIOGRAM REPORT: CPT | Mod: ,,, | Performed by: INTERNAL MEDICINE

## 2025-03-17 PROCEDURE — 3008F BODY MASS INDEX DOCD: CPT | Mod: CPTII,S$GLB,, | Performed by: INTERNAL MEDICINE

## 2025-03-17 PROCEDURE — 3066F NEPHROPATHY DOC TX: CPT | Mod: CPTII,S$GLB,, | Performed by: INTERNAL MEDICINE

## 2025-03-17 PROCEDURE — 99999 PR PBB SHADOW E&M-EST. PATIENT-LVL III: CPT | Mod: PBBFAC,,, | Performed by: INTERNAL MEDICINE

## 2025-03-17 PROCEDURE — 3075F SYST BP GE 130 - 139MM HG: CPT | Mod: CPTII,S$GLB,, | Performed by: INTERNAL MEDICINE

## 2025-03-17 PROCEDURE — 1160F RVW MEDS BY RX/DR IN RCRD: CPT | Mod: CPTII,S$GLB,, | Performed by: INTERNAL MEDICINE

## 2025-03-17 PROCEDURE — 4010F ACE/ARB THERAPY RXD/TAKEN: CPT | Mod: CPTII,S$GLB,, | Performed by: INTERNAL MEDICINE

## 2025-03-17 RX ORDER — VERAPAMIL HYDROCHLORIDE 120 MG/1
120 CAPSULE, EXTENDED RELEASE ORAL NIGHTLY
Qty: 90 CAPSULE | Refills: 1 | Status: SHIPPED | OUTPATIENT
Start: 2025-03-17 | End: 2026-03-17

## 2025-03-17 RX ORDER — HYDROCHLOROTHIAZIDE 12.5 MG/1
12.5 TABLET ORAL DAILY PRN
Qty: 90 TABLET | Refills: 1 | Status: SHIPPED | OUTPATIENT
Start: 2025-03-17

## 2025-03-17 NOTE — PROGRESS NOTES
Subjective:   Patient ID:  Nathalie Roland is a 34 y.o. female who presents for cardiac consult of No chief complaint on file.    Referring Physician: Raul Santana MD   [id number] [place] [address]    Reason for consult: palpitations.       Follow-up      The patient came in today for cardiac consult of No chief complaint on file.      Nathalie Roland is a 34 y.o. female pt with PVCs, HTN, HLD, PCKD, migraines, anxiety, anemia presents for follow up CV eval.     2/5/24  Lipids improved 5/2023 - Tc 144, LDL 68, .   Bp and hR well controlled. BMI 26 - 168 lbs  She has more palpitations lately. After eating she has more palpitations.     3/26/24  Results of heart monitor reveal frequent extra beats - PVCs with rare fast rhythms noted too but nothing sustained. Will start verapamil 120mg every evening to decrease the PVCs, follow up with me in 3-4 weeks will repeat ECG/Holter then and discuss further workup.   Vital monitor 2/2024 with freq PVCs -Overall PVC [place] at 7.39 %  added verapamil QHS  BP and HR stable. BMI 25 - 165 lbs  Iron levels were low last visit.   She will see PCP soon.   SHe feels much better.   She will be going to [employer] this year.     7/15/24  Lipids improved 6/2024.   BP and HR stable. BMI 25 - 164 lbs   She has been to [facility]..     3/17/25  BP and HR stable. BMI 28 - 178 lbs  Overall stable now. No palpitations, no PVCs lately.       ECG - NSR, Low voltage, poor RWP       Works for [employer]. - not at [employer]        FH - mother - had renal transplant for PCKD; HTN; grandfather - quad bypass    Conclusion 2/2024     The patient was monitored for a total of 6d 21h, underlying rhythm is Sinus.   The minimum heart rate was 50 bpm; the maximum 156 bpm; the average 74 bpm.   0 % of Atrial fibrillation/Atrial flutter with longest episode of 0 ms.   The total burden of AV Block present was 0 %   There were 0 pauses,    Total  count of Ventricular Tachycardia (VT): 33 episode(s). Longest VT: 3 beats on Day 7 / 05:10:32 pm. Fastest VT: 166 bpm on Day 5 / 07:02:16 am.   0 supraventricular episodes were found.  There were a total of 96388 PVCs with 2 morphologies and 5011 couplets. Overall PVC [place] at 7.39 %   There were a total of 0 Other Beats.   There were 0 total number of paced beats.   There were a total of 1 PSVCs with 1 morphologies and 0 couplets. Overall PSVC [place] at < 0.01 %   There is a total of 6 patient events with sinus rhythm and PVCs    Vital 5/2022  Conclusion       The patient was monitored for a total of 13d 21h, underlying rhythm is Sinus.     The patient was monitored for a total of 13d 21h, underlying rhythm is Sinus.  The minimum heart rate was 46 bpm; the maximum 149 bpm; the average 77 bpm.  0 % of Atrial fibrillation/Atrial flutter with longest episode of 0 ms.  -- AV block with 0 %  There were 0 pauses, the longest pause was 0 ms at --.  0 episodes of VT were found with Longest VT at 0 s .  3 supraventricular episodes were found. Longest SVT Episode 4 beats  There were a total of 757 PVCs with 2 morphologies and 0 couplets. Overall PVC [place] at 0.05 %  There were a total of 21 PSVCs with 1 morphologies and 4 couplets. Overall PSVC [place] at < 0.01 %  There is a total of 10 patient events.    Results for orders placed during the hospital encounter of 11/11/21    Exercise Stress - EKG    Interpretation Summary    The EKG portion of this study is negative for ischemia.    The patient reported no chest pain during the stress test.    There were no arrhythmias during stress.    The exercise capacity was normal.      Results for orders placed during the hospital encounter of 03/18/21    Echo Color Flow Doppler? Yes    Interpretation Summary  · The left ventricle is normal in size with concentric remodeling and normal systolic function. The estimated ejection fraction is 55%  · Normal left ventricular diastolic  function.  · With normal right ventricular systolic function.  · Normal central venous pressure (3 mmHg).  · The estimated PA systolic pressure is 38 mmHg.        Past Medical History:   Diagnosis Date    Anemia     Anxiety     Hypertension     renal hypertension    Migraine     Polycystic kidney disease        Past Surgical History:   Procedure Laterality Date     SECTION      eye cyst      HERNIA REPAIR      UMBILICAL HERNIA REPAIR         Social History     Tobacco Use    Smoking status: Never    Smokeless tobacco: Never   Substance Use Topics    Alcohol use: Yes     Alcohol/week: 0.0 standard drinks of alcohol     Comment: socially    Drug use: No       Family History   Problem Relation Name Age of Onset    Polycystic kidney disease Mother      Hypertension Mother      Cancer Mother          thyroid cancer    Polycystic kidney disease Maternal Uncle      Hypertension Maternal Uncle      Polycystic kidney disease Maternal Grandfather      Hypertension Maternal Grandfather      Cancer Paternal Grandmother          breast cancer    Hypertension Paternal Grandmother      Heart failure Paternal Grandmother      Breast cancer Paternal Grandmother      Heart attack Paternal Grandfather      Hypertension Paternal Grandfather      No Known Problems Father      Polycystic kidney disease Maternal Aunt      Bipolar disorder Paternal Aunt      Colon cancer Neg Hx      Uterine cancer Neg Hx      Ovarian cancer Neg Hx         Patient's Medications   New Prescriptions    No medications on file   Previous Medications    CARVEDILOL (COREG) 25 MG TABLET    Take 1 tablet (25 mg total) by mouth 2 (two) times daily with meals.    ENALAPRIL (VASOTEC) 20 MG TABLET    TAKE 1 TABLET BY MOUTH TWICE DAILY    FLUOXETINE 20 MG CAPSULE    Take 20 mg by mouth.    IRON-VITAMIN C 100-250 MG, ICAR-C, 100-250 MG TAB    TAKE 1 TABLET BY MOUTH TWICE DAILY    PRAVASTATIN (PRAVACHOL) 40 MG TABLET    Take 1 tablet (40 mg total) by mouth every  evening.    SODIUM BICARBONATE 650 MG TABLET    One po TID   Modified Medications    Modified Medication Previous Medication    HYDROCHLOROTHIAZIDE 12.5 MG TAB hydroCHLOROthiazide (HYDRODIURIL) 12.5 MG Tab       Take 1 tablet (12.5 mg total) by mouth daily as needed (edema, with elevated BP over 140/80).    TAKE 1 TABLET(12.5 MG) BY MOUTH EVERY DAY    VERAPAMIL (VERELAN) 120 MG C24P verapamiL (VERELAN) 120 MG C24P       Take 1 capsule (120 mg total) by mouth every evening.    Take 1 capsule (120 mg total) by mouth every evening.   Discontinued Medications    No medications on file       Review of Systems   Constitutional: Negative.    HENT: Negative.     Eyes: Negative.    Respiratory: Negative.     Cardiovascular:  Positive for palpitations.   Gastrointestinal: Negative.    Genitourinary: Negative.    Musculoskeletal: Negative.    Skin: Negative.    Neurological: Negative.    Endo/Heme/Allergies: Negative.    Psychiatric/Behavioral: Negative.     All 12 systems otherwise negative.      Wt Readings from Last 3 Encounters:   03/17/25 81.1 kg (178 lb 12.7 oz)   08/20/24 75.3 kg (166 lb 0.1 oz)   07/17/24 75.3 kg (166 lb 0.1 oz)     Temp Readings from Last 3 Encounters:   01/23/23 97.9 °F (36.6 °C) (Oral)   03/25/22 97.7 °F (36.5 °C)   03/24/22 97.9 °F (36.6 °C) (Temporal)     BP Readings from Last 3 Encounters:   03/17/25 133/77   07/17/24 92/60   07/15/24 104/74     Pulse Readings from Last 3 Encounters:   03/17/25 88   07/15/24 83   03/26/24 86       /77 (BP Location: Left arm, Patient Position: Sitting)   Pulse 88   Wt 81.1 kg (178 lb 12.7 oz)   SpO2 99%   BMI 28.00 kg/m²     Objective:   Physical Exam  Vitals and nursing note reviewed.   Constitutional:       General: She is not in acute distress.     Appearance: She is well-developed. She is not diaphoretic.   HENT:      Head: Normocephalic and atraumatic.      Nose: Nose normal.   Eyes:      General: No scleral icterus.     Conjunctiva/sclera:  Conjunctivae normal.   Neck:      Thyroid: No thyromegaly.      Vascular: No JVD.   Cardiovascular:      Rate and Rhythm: Normal rate and regular rhythm.      Heart sounds: S1 normal and S2 normal. No murmur heard.     No friction rub. No gallop. No S3 or S4 sounds.   Pulmonary:      Effort: Pulmonary effort is normal. No respiratory distress.      Breath sounds: Normal breath sounds. No stridor. No wheezing or rales.   Chest:      Chest wall: No tenderness.   Abdominal:      General: Bowel sounds are normal. There is no distension.      Palpations: Abdomen is soft. There is no mass.      Tenderness: There is no abdominal tenderness. There is no rebound.   Genitourinary:     Comments: Deferred  Musculoskeletal:         General: No tenderness or deformity. Normal range of motion.      Cervical back: Normal range of motion and neck supple.   Lymphadenopathy:      Cervical: No cervical adenopathy.   Skin:     General: Skin is warm and dry.      Coloration: Skin is not pale.      Findings: No erythema or rash.   Neurological:      Mental Status: She is alert and oriented to person, place, and time.      Motor: No abnormal muscle tone.      Coordination: Coordination normal.   Psychiatric:         Behavior: Behavior normal.         Thought Content: Thought content normal.         Judgment: Judgment normal.         Lab Results   Component Value Date     12/20/2024    K 4.5 12/20/2024     12/20/2024    CO2 22 (L) 12/20/2024    BUN 31 (H) 12/20/2024    CREATININE 2.3 (H) 12/20/2024    GLU 90 12/20/2024    HGBA1C 5.3 05/09/2024    HGBA1C 5.3 01/23/2023    MG 2.0 12/20/2024    AST 13 05/01/2023    ALT 12 05/01/2023    ALBUMIN 3.7 12/20/2024    PROT 6.4 05/01/2023    BILITOT 0.6 05/01/2023    WBC 6.98 12/20/2024    HGB 10.5 (L) 12/20/2024    HCT 33.4 (L) 12/20/2024    MCV 95 12/20/2024     12/20/2024    TSH 2.88 05/09/2024    TSH 1.590 01/23/2023    CHOL 125 06/24/2024    HDL 38 (L) 06/24/2024    LDLCALC  57.8 (L) 06/24/2024    TRIG 146 06/24/2024     Assessment:      1. PVC's (premature ventricular contractions)    2. Polycystic kidney disease    3. Iron deficiency anemia, unspecified iron deficiency anemia type    4. Palpitations    5. Tachycardia    6. Hypertension, renal    7. Stage 3a chronic kidney disease    8. Other migraine without status migrainosus, not intractable    9. Mixed hyperlipidemia    10. Gastroesophageal reflux disease, unspecified whether esophagitis present          Plan:     1. Palpitations with tachycardia, Freq PVCs -  - change labetelol to coreg BID  - prior ECHO and Holter - neg  - Vital monitor 5/2022 overall neg, rare ectopy, AVG HR 77 bpm  - ECG stress test neg 11/2021  - Vital monitor 2/2024 with freq PVCs -Overall PVC [address] at 7.39 %  added verapamil QHS    2. HTN - stable   - cont meds and titrate  - increased Coreg  - discussed to take HCTZ PRN /hold if BP is below 110/70    3. PCKD with CKD, anemia   - cont to monitor  - f/u with PCP/Nephro as needed    4. Anxiety, migraines  - cont tx     5. Anemia, Fe def  - cont tx - doubled iron  - low iron last visit     6. HLD   - cont Pravastatin 40mg   -Lipids improved  6/2024    7. GERD  - has improved   - off PPI , referred to GI - in past     Visit today included increased complexity associated with the care of the episodic problem PVCs addressed and managing the longitudinal care of the patient due to the serious and/or complex managed problem(s) .      Thank you for allowing me to participate in this patient's care. Please do not hesitate to contact me with any questions or concerns. Consult note has been forwarded to the referral physician.

## 2025-03-18 LAB
OHS QRS DURATION: 84 MS
OHS QTC CALCULATION: 445 MS

## 2025-04-21 RX ORDER — SODIUM BICARBONATE 650 MG/1
650 TABLET ORAL 3 TIMES DAILY
Qty: 270 TABLET | Refills: 1 | Status: SHIPPED | OUTPATIENT
Start: 2025-04-21

## 2025-04-23 ENCOUNTER — PATIENT MESSAGE (OUTPATIENT)
Dept: NEPHROLOGY | Facility: CLINIC | Age: 35
End: 2025-04-23
Payer: COMMERCIAL

## 2025-04-30 ENCOUNTER — APPOINTMENT (OUTPATIENT)
Dept: LAB | Facility: HOSPITAL | Age: 35
End: 2025-04-30
Attending: INTERNAL MEDICINE
Payer: COMMERCIAL

## 2025-05-07 ENCOUNTER — OFFICE VISIT (OUTPATIENT)
Dept: NEPHROLOGY | Facility: CLINIC | Age: 35
End: 2025-05-07
Payer: COMMERCIAL

## 2025-05-07 DIAGNOSIS — D50.8 OTHER IRON DEFICIENCY ANEMIA: ICD-10-CM

## 2025-05-07 DIAGNOSIS — E55.9 VITAMIN D DEFICIENCY: ICD-10-CM

## 2025-05-07 DIAGNOSIS — N18.4 ANEMIA IN STAGE 4 CHRONIC KIDNEY DISEASE: ICD-10-CM

## 2025-05-07 DIAGNOSIS — I12.9 HYPERTENSION, RENAL: ICD-10-CM

## 2025-05-07 DIAGNOSIS — D63.1 ANEMIA IN STAGE 4 CHRONIC KIDNEY DISEASE: ICD-10-CM

## 2025-05-07 DIAGNOSIS — R80.1 PERSISTENT PROTEINURIA: ICD-10-CM

## 2025-05-07 DIAGNOSIS — N25.81 SECONDARY HYPERPARATHYROIDISM OF RENAL ORIGIN: ICD-10-CM

## 2025-05-07 DIAGNOSIS — E87.20 METABOLIC ACIDOSIS: ICD-10-CM

## 2025-05-07 DIAGNOSIS — Q61.3 POLYCYSTIC KIDNEY DISEASE: ICD-10-CM

## 2025-05-07 DIAGNOSIS — N18.4 CKD (CHRONIC KIDNEY DISEASE) STAGE 4, GFR 15-29 ML/MIN: Primary | ICD-10-CM

## 2025-05-07 PROCEDURE — 3066F NEPHROPATHY DOC TX: CPT | Mod: CPTII,95,, | Performed by: INTERNAL MEDICINE

## 2025-05-07 PROCEDURE — 1160F RVW MEDS BY RX/DR IN RCRD: CPT | Mod: CPTII,95,, | Performed by: INTERNAL MEDICINE

## 2025-05-07 PROCEDURE — 4010F ACE/ARB THERAPY RXD/TAKEN: CPT | Mod: CPTII,95,, | Performed by: INTERNAL MEDICINE

## 2025-05-07 PROCEDURE — 1159F MED LIST DOCD IN RCRD: CPT | Mod: CPTII,95,, | Performed by: INTERNAL MEDICINE

## 2025-05-07 PROCEDURE — 98007 SYNCH AUDIO-VIDEO EST HI 40: CPT | Mod: 95,,, | Performed by: INTERNAL MEDICINE

## 2025-05-07 PROCEDURE — G2211 COMPLEX E/M VISIT ADD ON: HCPCS | Mod: 95,,, | Performed by: INTERNAL MEDICINE

## 2025-05-07 RX ORDER — CALCITRIOL 0.25 UG/1
CAPSULE ORAL
Qty: 12 CAPSULE | Refills: 3 | Status: SHIPPED | OUTPATIENT
Start: 2025-05-07

## 2025-05-07 NOTE — PROGRESS NOTES
Subjective:       Patient ID: Nathalie Roland is a 34 y.o. White female who presents for follow-up evaluation of Chronic Kidney Disease    Previously followed by Dr. King      2019:  Patient denies any complaints today. She is currently not interested in staring tolvaptan since she is considering another child.   2019:  Patient is feeling fine, no complaints.  She has not made a decision about tolvaptan since she is considering another child.   May 23, 2020:  Patient is feeling fine, Creatinine at 1.4 today. Patient is requesting repeat MRA since her aunt  from cerebral aneurysm.   2020:  Creatinine stable at 1.3.      Interval history May 2021: New Pt to Dr. Feng   The patient location is: Home  The chief complaint leading to consultation is: PKD, CKD  Visit type: audiovisual  Face to Face time with patient: 32 min  61 minutes of total time spent on the encounter, which includes face to face time and non-face to face time preparing to see the patient (eg, review of tests), Obtaining and/or reviewing separately obtained history, Documenting clinical information in the electronic or other health record, Independently interpreting results (not separately reported) and communicating results to the patient/family/caregiver, or Care coordination (not separately reported).   Each patient to whom he or she provides medical services by telemedicine is:  (1) informed of the relationship between the physician and patient and the respective role of any other health care provider with respect to management of the patient; and (2) notified that he or she may decline to receive medical services by telemedicine and may withdraw from such care at any time.  Notes: She is doing well. Last labs are from [facility] AUG 2021  The patient location is: Workplace  The chief complaint leading to consultation is: CKD and PKD  Visit type: audiovisual  54 minutes  of total time spent on the encounter, which includes face to face time and non-face to face time preparing to see the patient (eg, review of tests), Obtaining and/or reviewing separately obtained history, Documenting clinical information in the electronic or other health record, Independently interpreting results (not separately reported) and communicating results to the patient/family/caregiver, or Care coordination (not separately reported).   Each patient to whom he or she provides medical services by telemedicine is:  (1) informed of the relationship between the physician and patient and the respective role of any other health care provider with respect to management of the patient; and (2) notified that he or she may decline to receive medical services by telemedicine and may withdraw from such care at any time.  Notes: She sent her information for [employer] . Overall she feels well. No LE edema. BP is fine. No flank pain. No new HAs/change in HAs. Mom and GF are pre-DM. Her mother is s/p kidney txp! About 4 weeks out     Interval History July 2022:  The patient location is: Work  The chief complaint leading to consultation is: PKD  Visit type: audiovisual  52 minutes of total time spent on the encounter, which includes face to face time and non-face to face time preparing to see the patient (eg, review of tests), Obtaining and/or reviewing separately obtained history, Documenting clinical information in the electronic or other health record, Independently interpreting results (not separately reported) and communicating results to the patient/family/caregiver, or Care coordination (not separately reported).   Each patient to whom he or she provides medical services by telemedicine is:  (1) informed of the relationship between the physician and patient and the respective role of any other health care provider with respect to management of the patient; and (2) notified that he or she may decline to receive  medical services by telemedicine and may withdraw from such care at any time.  Notes: She and her family in January had COVID, no hospital stay. Increasing ace helped BPs. Overall feeling well. Her mother is one year out from (second) [facility]     Jan 2023:  The patient location is: [place]  The chief complaint leading to consultation is: PKD, CKD  Visit type: audiovisual  49 minutes of total time spent on the encounter, which includes face to face time and non-face to face time preparing to see the patient (eg, review of tests), Obtaining and/or reviewing separately obtained history, Documenting clinical information in the electronic or other health record, Independently interpreting results (not separately reported) and communicating results to the patient/family/caregiver, or Care coordination (not separately reported).   Each patient to whom he or she provides medical services by telemedicine is:  (1) informed of the relationship between the physician and patient and the respective role of any other health care provider with respect to management of the patient; and (2) notified that he or she may decline to receive medical services by telemedicine and may withdraw from such care at any time.  Notes:  She is feeling well. Now working in [facility], closer to home. Possibility of another child but not actively planning     August 2023:  The patient location is: [place]  The chief complaint leading to consultation is: PKD  Visit type: audiovisual  55 minutes of total time spent on the encounter, which includes face to face time and non-face to face time preparing to see the patient (eg, review of tests), Obtaining and/or reviewing separately obtained history, Documenting clinical information in the electronic or other health record, Independently interpreting results (not separately reported) and communicating results to the patient/family/caregiver, or Care coordination (not separately reported).   Each patient to whom he or  she provides medical services by telemedicine is:  (1) informed of the relationship between the physician and patient and the respective role of any other health care provider with respect to management of the patient; and (2) notified that he or she may decline to receive medical services by telemedicine and may withdraw from such care at any time.  Notes: She is doing well, feeling well. 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 BPs stable     May 2024:  The patient location is: [place]  The chief complaint leading to consultation is: PKD  Visit type: audiovisual  45 minutes of total time spent on the encounter, which includes face to face time and non-face to face time preparing to see the patient (eg, review of tests), Obtaining and/or reviewing separately obtained history, Documenting clinical information in the electronic or other health record, Independently interpreting results (not separately reported) and communicating results to the patient/family/caregiver, or Care coordination (not separately reported).   Each patient to whom he or she provides medical services by telemedicine is:  (1) informed of the relationship between the physician and patient and the respective role of any other health care provider with respect to management of the patient; and (2) notified that he or she may decline to receive medical services by telemedicine and may withdraw from such care at any time.  Notes: She is doing well. No LE edema. BPs better--110-120's/80's. Recent Bronchitis. Palpitations (PPI prescribed but not really taking)     August 2024:  The patient location is: [place]  The chief complaint leading to consultation is: PKD  Visit type: audiovisual  70 minutes of total time spent on the encounter, which includes face to face time and non-face to face time preparing to see the patient (eg, review of tests),  Obtaining and/or reviewing separately obtained history, Documenting clinical information in the electronic or other health record, Independently interpreting results (not separately reported) and communicating results to the patient/family/caregiver, or Care coordination (not separately reported).   Each patient to whom he or she provides medical services by telemedicine is:  (1) informed of the relationship between the physician and patient and the respective role of any other health care provider with respect to management of the patient; and (2) notified that he or she may decline to receive medical services by telemedicine and may withdraw from such care at any time.  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 Work is stable. Started bicarb and no problems. Mild constipation with increase of po iron.     Jan 2025:  The patient location is: [place]  The chief complaint leading to consultation is: PKD  Visit type: audiovisual  55 minutes of total time spent on the encounter, which includes face to face time and non-face to face time preparing to see the patient (eg, review of tests), Obtaining and/or reviewing separately obtained history, Documenting clinical information in the electronic or other health record, Independently interpreting results (not separately reported) and communicating results to the patient/family/caregiver, or Care coordination (not separately reported).   Each patient to whom he or she provides medical services by telemedicine is:  (1) informed of the relationship between the physician and patient and the respective role of any other health care provider with respect to management of the patient; and (2) notified that he or she may decline to receive medical services by telemedicine and may withdraw from such care at any time.  Notes: Migraines since 7 y/o, s/p Neuro evaluation in past. Doing well in general. No LE edema, no flank pain. Job situation precarious as her ortho group will join [employer]  [facility] soon.     May 7 2025:  The patient location is: [place]  The chief complaint leading to consultation is: PKD  Visit type: audiovisual  45 minutes of total time spent on the encounter, which includes face to face time and non-face to face time preparing to see the patient (eg, review of tests), Obtaining and/or reviewing separately obtained history, Documenting clinical information in the electronic or other health record, Independently interpreting results (not separately reported) and communicating results to the patient/family/caregiver, or Care coordination (not separately reported).   Each patient to whom he or she provides medical services by telemedicine is:  (1) informed of the relationship between the physician and patient and the respective role of any other health care provider with respect to management of the patient; and (2) notified that he or she may decline to receive medical services by telemedicine and may withdraw from such care at any time.  Notes: New job [employer], working from home. Feeling well.           Family History   Problem Relation Name Age of Onset    Polycystic kidney disease Mother      Hypertension Mother      Cancer Mother          thyroid cancer    Polycystic kidney disease Maternal Uncle      Hypertension Maternal Uncle      Polycystic kidney disease Maternal Grandfather      Hypertension Maternal Grandfather      Cancer Paternal Grandmother          breast cancer    Hypertension Paternal Grandmother      Heart failure Paternal Grandmother      Breast cancer Paternal Grandmother      Heart attack Paternal Grandfather      Hypertension Paternal Grandfather      No Known Problems Father      Polycystic kidney disease Maternal Aunt      Bipolar disorder Paternal Aunt      Colon cancer Neg Hx      Uterine cancer Neg Hx      Ovarian cancer Neg Hx           Review of Systems   Constitutional:  Negative for activity change, appetite change, fatigue and unexpected weight  change.   HENT:  Negative for facial swelling.    Respiratory:  Negative for shortness of breath.    Cardiovascular:  Negative for chest pain and leg swelling.   Gastrointestinal:  Negative for abdominal pain.   Genitourinary:  Negative for hematuria.   Psychiatric/Behavioral:  Negative for confusion and decreased concentration.        Objective:      Physical Exam  Vitals and nursing note reviewed.   Constitutional:       General: She is not in acute distress.     Appearance: She is not ill-appearing.   HENT:      Head: Atraumatic.   Eyes:      General: No scleral icterus.  Pulmonary:      Effort: No respiratory distress.   Neurological:      Mental Status: She is alert and oriented to person, place, and time. Mental status is at baseline.   Psychiatric:         Mood and Affect: Mood normal.         Behavior: Behavior normal.         Thought Content: Thought content normal.         Judgment: Judgment normal.       Assessment:       1. CKD (chronic kidney disease) stage 4, GFR 15-29 ml/min    2. Polycystic kidney disease    3. Secondary hyperparathyroidism of renal origin    4. Metabolic acidosis    5. Vitamin D deficiency    6. Hypertension, renal    7. Persistent proteinuria    8. Anemia in stage 4 chronic kidney disease    9. Other iron deficiency anemia          Plan:           ASSESSMENT AND PLAN:  History of PKD, HTN, migraines, proteinuria presents to the renal clinic for evaluation of renal insufficiency.    PKD  - progression of kidney disease, as expected currently Stage 4  - current eGFR is 21>>discussed when to refer to kidney txp  - exhibits intermittent proteinuria, most recent lower at 230mg to 530mg  - Continue RAAS blockade   - due for brain MRA around 6/2025   - renal u/s  with area of concern>>check MRI reviewed, no area of concern   - BP parameters goals are met, continue current medications as well as a push for low sodium diet, healthy diet and exercise   - she decline tolvaptan,     HTN  -  controlled, with BP parameters for PKD   - monitor on current meds    Mineral and Bone Disease  - continue D3, level is in target, monitor to not overshoot  - Secondary Hyperparathyroidism, start calcitriol weekly   - cotninue exogenous bicarbonate for metabolic acidosis,  3 per day      Mild Anemia  - monitor H&H  - Tsat low dropped on po iron BID, no strong indication for IV iron, stable Hb, monitor Tsat  - offered IV iron, she will consider     Screen for DM 2 negative      RTC 3mo   Offered sooner appt to discuss if desired   Visit today included increased complexity associated with the care of the episodic problem addressed and/or managing the longitudinal care of the patient due to the serious and/or complex managed problem(s) as per above diagnosis list.

## 2025-05-08 ENCOUNTER — PATIENT MESSAGE (OUTPATIENT)
Dept: NEPHROLOGY | Facility: CLINIC | Age: 35
End: 2025-05-08
Payer: COMMERCIAL

## 2025-05-08 DIAGNOSIS — D50.8 OTHER IRON DEFICIENCY ANEMIA: Primary | ICD-10-CM

## 2025-05-12 PROBLEM — D50.9 IDA (IRON DEFICIENCY ANEMIA): Status: ACTIVE | Noted: 2025-05-12

## 2025-05-12 RX ORDER — EPINEPHRINE 0.3 MG/.3ML
0.3 INJECTION SUBCUTANEOUS ONCE AS NEEDED
OUTPATIENT
Start: 2025-05-12

## 2025-05-12 RX ORDER — SODIUM CHLORIDE 0.9 % (FLUSH) 0.9 %
10 SYRINGE (ML) INJECTION
OUTPATIENT
Start: 2025-05-12

## 2025-05-12 RX ORDER — HEPARIN 100 UNIT/ML
500 SYRINGE INTRAVENOUS
OUTPATIENT
Start: 2025-05-12

## 2025-05-26 ENCOUNTER — INFUSION (OUTPATIENT)
Dept: INFUSION THERAPY | Facility: HOSPITAL | Age: 35
End: 2025-05-26
Attending: FAMILY MEDICINE
Payer: COMMERCIAL

## 2025-05-26 ENCOUNTER — TELEPHONE (OUTPATIENT)
Dept: NEPHROLOGY | Facility: CLINIC | Age: 35
End: 2025-05-26
Payer: COMMERCIAL

## 2025-05-26 VITALS
HEART RATE: 78 BPM | RESPIRATION RATE: 18 BRPM | SYSTOLIC BLOOD PRESSURE: 119 MMHG | DIASTOLIC BLOOD PRESSURE: 72 MMHG | OXYGEN SATURATION: 99 % | TEMPERATURE: 99 F

## 2025-05-26 DIAGNOSIS — D50.8 OTHER IRON DEFICIENCY ANEMIA: Primary | ICD-10-CM

## 2025-05-26 PROCEDURE — 63600175 PHARM REV CODE 636 W HCPCS: Performed by: INTERNAL MEDICINE

## 2025-05-26 PROCEDURE — 96374 THER/PROPH/DIAG INJ IV PUSH: CPT

## 2025-05-26 RX ORDER — EPINEPHRINE 0.3 MG/.3ML
0.3 INJECTION SUBCUTANEOUS ONCE AS NEEDED
Status: DISCONTINUED | OUTPATIENT
Start: 2025-05-26 | End: 2025-05-26 | Stop reason: HOSPADM

## 2025-05-26 RX ORDER — EPINEPHRINE 0.3 MG/.3ML
0.3 INJECTION SUBCUTANEOUS ONCE AS NEEDED
OUTPATIENT
Start: 2025-06-08

## 2025-05-26 RX ORDER — SODIUM CHLORIDE 0.9 % (FLUSH) 0.9 %
10 SYRINGE (ML) INJECTION
OUTPATIENT
Start: 2025-06-08

## 2025-05-26 RX ORDER — HEPARIN 100 UNIT/ML
500 SYRINGE INTRAVENOUS
OUTPATIENT
Start: 2025-06-08

## 2025-05-26 RX ADMIN — IRON SUCROSE 200 MG: 20 INJECTION, SOLUTION INTRAVENOUS at 07:05

## 2025-05-26 NOTE — NURSING
Infusion # Venofer - 200 mg for 5 doses  First Dose      Recent labs? 4/30/25  Last Nephrology provider visit- Seen by Jung on 5/7/25     Premeds-N/A     Venofer 200 mg administered IV at a 5 minute rate per orders; see MAR and vitals for more details. Monitored for 30 minutes post infusion for any s/sx of reaction. Tolerated well without adverse events, discharged and ambulatory out of clinic.

## 2025-05-26 NOTE — PLAN OF CARE
Problem: Anemia  Goal: Anemia Symptom Improvement  Outcome: Progressing     Problem: Adult Inpatient Plan of Care  Goal: Plan of Care Review  Outcome: Progressing  Goal: Patient-Specific Goal (Individualized)  Outcome: Progressing  Goal: Optimal Comfort and Wellbeing  Outcome: Progressing

## 2025-06-02 ENCOUNTER — INFUSION (OUTPATIENT)
Dept: INFUSION THERAPY | Facility: HOSPITAL | Age: 35
End: 2025-06-02
Attending: FAMILY MEDICINE
Payer: COMMERCIAL

## 2025-06-02 VITALS
RESPIRATION RATE: 16 BRPM | OXYGEN SATURATION: 98 % | TEMPERATURE: 98 F | HEART RATE: 72 BPM | DIASTOLIC BLOOD PRESSURE: 68 MMHG | SYSTOLIC BLOOD PRESSURE: 110 MMHG

## 2025-06-02 DIAGNOSIS — D50.8 OTHER IRON DEFICIENCY ANEMIA: Primary | ICD-10-CM

## 2025-06-02 PROCEDURE — 63600175 PHARM REV CODE 636 W HCPCS: Performed by: INTERNAL MEDICINE

## 2025-06-02 PROCEDURE — 25000003 PHARM REV CODE 250: Performed by: INTERNAL MEDICINE

## 2025-06-02 PROCEDURE — 96374 THER/PROPH/DIAG INJ IV PUSH: CPT

## 2025-06-02 RX ORDER — SODIUM CHLORIDE 0.9 % (FLUSH) 0.9 %
10 SYRINGE (ML) INJECTION
OUTPATIENT
Start: 2025-06-09

## 2025-06-02 RX ORDER — HEPARIN 100 UNIT/ML
500 SYRINGE INTRAVENOUS
OUTPATIENT
Start: 2025-06-09

## 2025-06-02 RX ORDER — EPINEPHRINE 0.3 MG/.3ML
0.3 INJECTION SUBCUTANEOUS ONCE AS NEEDED
OUTPATIENT
Start: 2025-06-09

## 2025-06-02 RX ADMIN — SODIUM CHLORIDE: 9 INJECTION, SOLUTION INTRAVENOUS at 07:06

## 2025-06-02 RX ADMIN — IRON SUCROSE 200 MG: 20 INJECTION, SOLUTION INTRAVENOUS at 07:06

## 2025-06-16 ENCOUNTER — INFUSION (OUTPATIENT)
Dept: INFUSION THERAPY | Facility: HOSPITAL | Age: 35
End: 2025-06-16
Attending: FAMILY MEDICINE
Payer: COMMERCIAL

## 2025-06-16 VITALS
DIASTOLIC BLOOD PRESSURE: 66 MMHG | TEMPERATURE: 98 F | SYSTOLIC BLOOD PRESSURE: 105 MMHG | HEART RATE: 64 BPM | RESPIRATION RATE: 16 BRPM | OXYGEN SATURATION: 99 %

## 2025-06-16 DIAGNOSIS — D50.8 OTHER IRON DEFICIENCY ANEMIA: Primary | ICD-10-CM

## 2025-06-16 PROCEDURE — 96374 THER/PROPH/DIAG INJ IV PUSH: CPT

## 2025-06-16 PROCEDURE — 63600175 PHARM REV CODE 636 W HCPCS: Performed by: INTERNAL MEDICINE

## 2025-06-16 RX ORDER — EPINEPHRINE 0.3 MG/.3ML
0.3 INJECTION SUBCUTANEOUS ONCE AS NEEDED
OUTPATIENT
Start: 2025-06-16

## 2025-06-16 RX ORDER — HEPARIN 100 UNIT/ML
500 SYRINGE INTRAVENOUS
OUTPATIENT
Start: 2025-06-16

## 2025-06-16 RX ORDER — EPINEPHRINE 0.3 MG/.3ML
0.3 INJECTION SUBCUTANEOUS ONCE AS NEEDED
Status: DISCONTINUED | OUTPATIENT
Start: 2025-06-16 | End: 2025-06-16 | Stop reason: HOSPADM

## 2025-06-16 RX ORDER — SODIUM CHLORIDE 0.9 % (FLUSH) 0.9 %
10 SYRINGE (ML) INJECTION
OUTPATIENT
Start: 2025-06-16

## 2025-06-16 RX ADMIN — IRON SUCROSE 200 MG: 20 INJECTION, SOLUTION INTRAVENOUS at 07:06

## 2025-06-16 NOTE — PLAN OF CARE
Problem: Adult Inpatient Plan of Care  Goal: Plan of Care Review  Outcome: Progressing  Flowsheets (Taken 6/16/2025 0721)  Plan of Care Reviewed With: patient  Goal: Patient-Specific Goal (Individualized)  Outcome: Progressing  Flowsheets (Taken 6/16/2025 0721)  Individualized Care Needs: pt likes her feet elevated in the recliner chair with wam blanket and pillow. Refreshment provided  Anxieties, Fears or Concerns: pt denies  Patient/Family-Specific Goals (Include Timeframe): pt to tolerate iron infusion well without any adverse reactions  Goal: Optimal Comfort and Wellbeing  Outcome: Progressing  Intervention: Provide Person-Centered Care  Flowsheets (Taken 6/16/2025 0721)  Trust Relationship/Rapport:   care explained   choices provided   emotional support provided   empathic listening provided   questions answered   questions encouraged   reassurance provided   thoughts/feelings acknowledged

## 2025-06-23 ENCOUNTER — TELEPHONE (OUTPATIENT)
Dept: NEPHROLOGY | Facility: CLINIC | Age: 35
End: 2025-06-23
Payer: COMMERCIAL

## 2025-06-23 ENCOUNTER — INFUSION (OUTPATIENT)
Dept: INFUSION THERAPY | Facility: HOSPITAL | Age: 35
End: 2025-06-23
Attending: FAMILY MEDICINE
Payer: COMMERCIAL

## 2025-06-23 VITALS
DIASTOLIC BLOOD PRESSURE: 67 MMHG | RESPIRATION RATE: 18 BRPM | SYSTOLIC BLOOD PRESSURE: 104 MMHG | OXYGEN SATURATION: 100 % | HEART RATE: 69 BPM | TEMPERATURE: 98 F

## 2025-06-23 DIAGNOSIS — D50.8 OTHER IRON DEFICIENCY ANEMIA: Primary | ICD-10-CM

## 2025-06-23 PROCEDURE — 96374 THER/PROPH/DIAG INJ IV PUSH: CPT

## 2025-06-23 PROCEDURE — 63600175 PHARM REV CODE 636 W HCPCS: Performed by: INTERNAL MEDICINE

## 2025-06-23 RX ORDER — HEPARIN 100 UNIT/ML
500 SYRINGE INTRAVENOUS
Status: CANCELLED | OUTPATIENT
Start: 2025-06-23

## 2025-06-23 RX ORDER — SODIUM CHLORIDE 0.9 % (FLUSH) 0.9 %
10 SYRINGE (ML) INJECTION
Status: DISCONTINUED | OUTPATIENT
Start: 2025-06-23 | End: 2025-06-23 | Stop reason: HOSPADM

## 2025-06-23 RX ORDER — EPINEPHRINE 0.3 MG/.3ML
0.3 INJECTION SUBCUTANEOUS ONCE AS NEEDED
Status: CANCELLED | OUTPATIENT
Start: 2025-06-30

## 2025-06-23 RX ORDER — HEPARIN 100 UNIT/ML
500 SYRINGE INTRAVENOUS
Status: CANCELLED | OUTPATIENT
Start: 2025-06-30

## 2025-06-23 RX ORDER — EPINEPHRINE 0.3 MG/.3ML
0.3 INJECTION SUBCUTANEOUS ONCE AS NEEDED
Status: SHIPPED | OUTPATIENT
Start: 2025-06-23 | End: 2036-11-18

## 2025-06-23 RX ORDER — SODIUM CHLORIDE 0.9 % (FLUSH) 0.9 %
10 SYRINGE (ML) INJECTION
Status: CANCELLED | OUTPATIENT
Start: 2025-06-30

## 2025-06-23 RX ORDER — EPINEPHRINE 0.3 MG/.3ML
0.3 INJECTION SUBCUTANEOUS ONCE AS NEEDED
Status: CANCELLED | OUTPATIENT
Start: 2025-06-23

## 2025-06-23 RX ORDER — SODIUM CHLORIDE 0.9 % (FLUSH) 0.9 %
10 SYRINGE (ML) INJECTION
OUTPATIENT
Start: 2025-06-30

## 2025-06-23 RX ORDER — SODIUM CHLORIDE 0.9 % (FLUSH) 0.9 %
10 SYRINGE (ML) INJECTION
Status: CANCELLED | OUTPATIENT
Start: 2025-06-23

## 2025-06-23 RX ORDER — SODIUM CHLORIDE 0.9 % (FLUSH) 0.9 %
10 SYRINGE (ML) INJECTION
Status: SHIPPED | OUTPATIENT
Start: 2025-06-23

## 2025-06-23 RX ORDER — HEPARIN 100 UNIT/ML
500 SYRINGE INTRAVENOUS
Status: DISCONTINUED | OUTPATIENT
Start: 2025-06-23 | End: 2025-06-23 | Stop reason: HOSPADM

## 2025-06-23 RX ORDER — HEPARIN 100 UNIT/ML
500 SYRINGE INTRAVENOUS
Status: SHIPPED | OUTPATIENT
Start: 2025-06-23

## 2025-06-23 RX ORDER — EPINEPHRINE 0.3 MG/.3ML
0.3 INJECTION SUBCUTANEOUS ONCE AS NEEDED
Status: DISCONTINUED | OUTPATIENT
Start: 2025-06-23 | End: 2025-06-23 | Stop reason: HOSPADM

## 2025-06-23 RX ORDER — HEPARIN 100 UNIT/ML
500 SYRINGE INTRAVENOUS
OUTPATIENT
Start: 2025-06-30

## 2025-06-23 RX ORDER — EPINEPHRINE 0.3 MG/.3ML
0.3 INJECTION SUBCUTANEOUS ONCE AS NEEDED
OUTPATIENT
Start: 2025-06-30

## 2025-06-23 RX ADMIN — IRON SUCROSE 200 MG: 20 INJECTION, SOLUTION INTRAVENOUS at 08:06

## 2025-06-23 NOTE — NURSING
Pt tolerated iron infusion well today without adverse reaction. Pt discharge to home with scheduled appt for next infusion.

## 2025-06-23 NOTE — PLAN OF CARE
Problem: Adult Inpatient Plan of Care  Goal: Plan of Care Review  Outcome: Progressing  Flowsheets (Taken 6/23/2025 0750)  Plan of Care Reviewed With: patient  Goal: Patient-Specific Goal (Individualized)  Outcome: Progressing  Flowsheets (Taken 6/23/2025 0750)  Individualized Care Needs: pt likes her feet elevated with warm blanket. Refused refreshments at present  Anxieties, Fears or Concerns: Pt denies  Patient/Family-Specific Goals (Include Timeframe): pt to tolerate her iron infusion well today without adverse reactions  Goal: Optimal Comfort and Wellbeing  Outcome: Progressing  Intervention: Provide Person-Centered Care  Flowsheets (Taken 6/23/2025 0750)  Trust Relationship/Rapport:   care explained   choices provided   emotional support provided   empathic listening provided   questions answered   questions encouraged   reassurance provided   thoughts/feelings acknowledged

## 2025-06-30 ENCOUNTER — INFUSION (OUTPATIENT)
Dept: INFUSION THERAPY | Facility: HOSPITAL | Age: 35
End: 2025-06-30
Attending: FAMILY MEDICINE
Payer: COMMERCIAL

## 2025-06-30 VITALS
OXYGEN SATURATION: 98 % | RESPIRATION RATE: 16 BRPM | DIASTOLIC BLOOD PRESSURE: 64 MMHG | SYSTOLIC BLOOD PRESSURE: 97 MMHG | HEART RATE: 70 BPM | TEMPERATURE: 98 F

## 2025-06-30 DIAGNOSIS — D50.8 OTHER IRON DEFICIENCY ANEMIA: Primary | ICD-10-CM

## 2025-06-30 PROCEDURE — 63600175 PHARM REV CODE 636 W HCPCS: Performed by: INTERNAL MEDICINE

## 2025-06-30 PROCEDURE — 96374 THER/PROPH/DIAG INJ IV PUSH: CPT

## 2025-06-30 RX ORDER — SODIUM CHLORIDE 0.9 % (FLUSH) 0.9 %
10 SYRINGE (ML) INJECTION
OUTPATIENT
Start: 2025-06-30

## 2025-06-30 RX ORDER — HEPARIN 100 UNIT/ML
500 SYRINGE INTRAVENOUS
OUTPATIENT
Start: 2025-06-30

## 2025-06-30 RX ORDER — EPINEPHRINE 0.3 MG/.3ML
0.3 INJECTION SUBCUTANEOUS ONCE AS NEEDED
OUTPATIENT
Start: 2025-06-30

## 2025-06-30 RX ADMIN — IRON SUCROSE 200 MG: 20 INJECTION, SOLUTION INTRAVENOUS at 07:06

## 2025-06-30 NOTE — PLAN OF CARE
Patient tolerated Venofer well today; no adverse reaction noted.  C/O chronic fatigue.  IV discontinued with catheter intact and pressure dressing applied to the site.  Has f/u appt(s) scheduled per MD request.  No questions or concerns voiced.  NAD noted upon discharge.

## 2025-06-30 NOTE — DISCHARGE INSTRUCTIONS
Thank you for letting me take care of YOU!!    [facility]  [address] [facility]  [id number] [place] [address] Drive  [phone] phone     [phone] fax  Hours of Operation: Monday- Friday 8:00am- 5:00pm  After hours phone  [phone]  Hematology / Oncology Physicians on call:    Dr. Beto Morales        Nurse Practitioners:    Charito Jimenez, ABBEY Brown, LINDSAY Issa, ABBEY Lucia, ABBEY Miller, ABBEY      Please don't hesitate to call if you have any concerns.

## 2025-07-07 RX ORDER — PRAVASTATIN SODIUM 40 MG/1
40 TABLET ORAL NIGHTLY
Qty: 90 TABLET | Refills: 1 | Status: SHIPPED | OUTPATIENT
Start: 2025-07-07 | End: 2026-07-07

## 2025-07-31 RX ORDER — SODIUM BICARBONATE 650 MG/1
650 TABLET ORAL 3 TIMES DAILY
Qty: 270 TABLET | Refills: 1 | Status: SHIPPED | OUTPATIENT
Start: 2025-07-31

## 2025-08-03 ENCOUNTER — PATIENT MESSAGE (OUTPATIENT)
Dept: NEPHROLOGY | Facility: CLINIC | Age: 35
End: 2025-08-03
Payer: COMMERCIAL

## 2025-08-12 ENCOUNTER — LAB VISIT (OUTPATIENT)
Dept: LAB | Facility: HOSPITAL | Age: 35
End: 2025-08-12
Attending: INTERNAL MEDICINE
Payer: COMMERCIAL

## 2025-08-12 DIAGNOSIS — N18.4 CKD (CHRONIC KIDNEY DISEASE) STAGE 4, GFR 15-29 ML/MIN: ICD-10-CM

## 2025-08-12 DIAGNOSIS — E87.20 METABOLIC ACIDOSIS: ICD-10-CM

## 2025-08-12 DIAGNOSIS — Q61.3 POLYCYSTIC KIDNEY DISEASE: ICD-10-CM

## 2025-08-12 DIAGNOSIS — N25.81 SECONDARY HYPERPARATHYROIDISM OF RENAL ORIGIN: ICD-10-CM

## 2025-08-12 LAB
ABSOLUTE EOSINOPHIL (OHS): 0.2 K/UL
ABSOLUTE MONOCYTE (OHS): 0.57 K/UL (ref 0.3–1)
ABSOLUTE NEUTROPHIL COUNT (OHS): 4.22 K/UL (ref 1.8–7.7)
ALBUMIN SERPL BCP-MCNC: 4 G/DL (ref 3.5–5.2)
ANION GAP (OHS): 8 MMOL/L (ref 8–16)
BASOPHILS # BLD AUTO: 0.04 K/UL
BASOPHILS NFR BLD AUTO: 0.6 %
BUN SERPL-MCNC: 33 MG/DL (ref 6–20)
CALCIUM SERPL-MCNC: 8.5 MG/DL (ref 8.7–10.5)
CHLORIDE SERPL-SCNC: 102 MMOL/L (ref 95–110)
CO2 SERPL-SCNC: 24 MMOL/L (ref 23–29)
CREAT SERPL-MCNC: 3 MG/DL (ref 0.5–1.4)
ERYTHROCYTE [DISTWIDTH] IN BLOOD BY AUTOMATED COUNT: 12.6 % (ref 11.5–14.5)
GFR SERPLBLD CREATININE-BSD FMLA CKD-EPI: 20 ML/MIN/1.73/M2
GLUCOSE SERPL-MCNC: 92 MG/DL (ref 70–110)
HCT VFR BLD AUTO: 33.7 % (ref 37–48.5)
HGB BLD-MCNC: 10.9 GM/DL (ref 12–16)
IMM GRANULOCYTES # BLD AUTO: 0.02 K/UL (ref 0–0.04)
IMM GRANULOCYTES NFR BLD AUTO: 0.3 % (ref 0–0.5)
LYMPHOCYTES # BLD AUTO: 1.85 K/UL (ref 1–4.8)
MAGNESIUM SERPL-MCNC: 2 MG/DL (ref 1.6–2.6)
MCH RBC QN AUTO: 30.8 PG (ref 27–31)
MCHC RBC AUTO-ENTMCNC: 32.3 G/DL (ref 32–36)
MCV RBC AUTO: 95 FL (ref 82–98)
NUCLEATED RBC (/100WBC) (OHS): 0 /100 WBC
PHOSPHATE SERPL-MCNC: 3.8 MG/DL (ref 2.7–4.5)
PLATELET # BLD AUTO: 212 K/UL (ref 150–450)
PMV BLD AUTO: 10.7 FL (ref 9.2–12.9)
POTASSIUM SERPL-SCNC: 4.6 MMOL/L (ref 3.5–5.1)
PTH-INTACT SERPL-MCNC: 315.8 PG/ML (ref 9–77)
RBC # BLD AUTO: 3.54 M/UL (ref 4–5.4)
RELATIVE EOSINOPHIL (OHS): 2.9 %
RELATIVE LYMPHOCYTE (OHS): 26.8 % (ref 18–48)
RELATIVE MONOCYTE (OHS): 8.3 % (ref 4–15)
RELATIVE NEUTROPHIL (OHS): 61.1 % (ref 38–73)
SODIUM SERPL-SCNC: 134 MMOL/L (ref 136–145)
WBC # BLD AUTO: 6.9 K/UL (ref 3.9–12.7)

## 2025-08-12 PROCEDURE — 83735 ASSAY OF MAGNESIUM: CPT

## 2025-08-12 PROCEDURE — 82310 ASSAY OF CALCIUM: CPT

## 2025-08-12 PROCEDURE — 36415 COLL VENOUS BLD VENIPUNCTURE: CPT | Mod: PO

## 2025-08-12 PROCEDURE — 83970 ASSAY OF PARATHORMONE: CPT

## 2025-08-12 PROCEDURE — 85025 COMPLETE CBC W/AUTO DIFF WBC: CPT

## 2025-08-19 ENCOUNTER — OFFICE VISIT (OUTPATIENT)
Dept: NEPHROLOGY | Facility: CLINIC | Age: 35
End: 2025-08-19
Payer: COMMERCIAL

## 2025-08-19 DIAGNOSIS — N18.4 CKD (CHRONIC KIDNEY DISEASE) STAGE 4, GFR 15-29 ML/MIN: Primary | ICD-10-CM

## 2025-08-19 DIAGNOSIS — N25.81 SECONDARY HYPERPARATHYROIDISM OF RENAL ORIGIN: ICD-10-CM

## 2025-08-19 DIAGNOSIS — D63.1 ANEMIA IN STAGE 4 CHRONIC KIDNEY DISEASE: ICD-10-CM

## 2025-08-19 DIAGNOSIS — I12.9 HYPERTENSION, RENAL: ICD-10-CM

## 2025-08-19 DIAGNOSIS — E87.20 METABOLIC ACIDOSIS: ICD-10-CM

## 2025-08-19 DIAGNOSIS — Q61.3 POLYCYSTIC KIDNEY DISEASE: ICD-10-CM

## 2025-08-19 DIAGNOSIS — N18.4 ANEMIA IN STAGE 4 CHRONIC KIDNEY DISEASE: ICD-10-CM

## 2025-08-19 PROCEDURE — 98006 SYNCH AUDIO-VIDEO EST MOD 30: CPT | Mod: 95,,, | Performed by: INTERNAL MEDICINE

## 2025-08-19 PROCEDURE — 3066F NEPHROPATHY DOC TX: CPT | Mod: CPTII,95,, | Performed by: INTERNAL MEDICINE

## 2025-08-19 PROCEDURE — 3044F HG A1C LEVEL LT 7.0%: CPT | Mod: CPTII,95,, | Performed by: INTERNAL MEDICINE

## 2025-08-19 PROCEDURE — G2211 COMPLEX E/M VISIT ADD ON: HCPCS | Mod: 95,,, | Performed by: INTERNAL MEDICINE

## 2025-08-19 PROCEDURE — 1159F MED LIST DOCD IN RCRD: CPT | Mod: CPTII,95,, | Performed by: INTERNAL MEDICINE

## 2025-08-19 PROCEDURE — 4010F ACE/ARB THERAPY RXD/TAKEN: CPT | Mod: CPTII,95,, | Performed by: INTERNAL MEDICINE

## 2025-08-19 RX ORDER — CALCITRIOL 0.25 UG/1
CAPSULE ORAL
Qty: 36 CAPSULE | Refills: 3 | Status: SHIPPED | OUTPATIENT
Start: 2025-08-19

## 2025-08-25 ENCOUNTER — PATIENT MESSAGE (OUTPATIENT)
Dept: NEPHROLOGY | Facility: CLINIC | Age: 35
End: 2025-08-25
Payer: COMMERCIAL

## (undated) DEVICE — DRESSING COVER AQUACEL AG SURG